# Patient Record
Sex: FEMALE | Race: WHITE | NOT HISPANIC OR LATINO
[De-identification: names, ages, dates, MRNs, and addresses within clinical notes are randomized per-mention and may not be internally consistent; named-entity substitution may affect disease eponyms.]

---

## 2017-02-15 ENCOUNTER — RX RENEWAL (OUTPATIENT)
Age: 57
End: 2017-02-15

## 2017-03-23 ENCOUNTER — LABORATORY RESULT (OUTPATIENT)
Age: 57
End: 2017-03-23

## 2017-03-24 ENCOUNTER — APPOINTMENT (OUTPATIENT)
Dept: FAMILY MEDICINE | Facility: CLINIC | Age: 57
End: 2017-03-24

## 2017-03-24 VITALS
OXYGEN SATURATION: 98 % | TEMPERATURE: 98.2 F | SYSTOLIC BLOOD PRESSURE: 110 MMHG | DIASTOLIC BLOOD PRESSURE: 70 MMHG | WEIGHT: 145 LBS | HEIGHT: 64 IN | HEART RATE: 68 BPM | RESPIRATION RATE: 12 BRPM | BODY MASS INDEX: 24.75 KG/M2

## 2017-03-25 ENCOUNTER — LABORATORY RESULT (OUTPATIENT)
Age: 57
End: 2017-03-25

## 2017-03-27 LAB
ALBUMIN SERPL ELPH-MCNC: 4.5 G/DL
ALP BLD-CCNC: 89 U/L
ALT SERPL-CCNC: 28 U/L
ANA SER IF-ACNC: NEGATIVE
ANION GAP SERPL CALC-SCNC: 17 MMOL/L
APPEARANCE: CLEAR
AST SERPL-CCNC: 21 U/L
BASOPHILS # BLD AUTO: 0.03 K/UL
BASOPHILS NFR BLD AUTO: 0.4 %
BILIRUB SERPL-MCNC: 0.2 MG/DL
BILIRUBIN URINE: NEGATIVE
BLOOD URINE: NEGATIVE
BUN SERPL-MCNC: 18 MG/DL
CALCIUM SERPL-MCNC: 10.3 MG/DL
CHLORIDE SERPL-SCNC: 104 MMOL/L
CHOLEST SERPL-MCNC: 211 MG/DL
CHOLEST/HDLC SERPL: 3.9 RATIO
CO2 SERPL-SCNC: 25 MMOL/L
COLOR: YELLOW
CREAT SERPL-MCNC: 1.01 MG/DL
EOSINOPHIL # BLD AUTO: 0.24 K/UL
EOSINOPHIL NFR BLD AUTO: 3.3 %
ERYTHROCYTE [SEDIMENTATION RATE] IN BLOOD BY WESTERGREN METHOD: 6 MM/HR
GLUCOSE QUALITATIVE U: NORMAL
GLUCOSE SERPL-MCNC: 100 MG/DL
HCT VFR BLD CALC: 43.6 %
HDLC SERPL-MCNC: 54 MG/DL
HGB BLD-MCNC: 14.5 G/DL
IMM GRANULOCYTES NFR BLD AUTO: 0.1 %
KETONES URINE: NEGATIVE
LDLC SERPL CALC-MCNC: 116 MG/DL
LEUKOCYTE ESTERASE URINE: ABNORMAL
LYMPHOCYTES # BLD AUTO: 2.09 K/UL
LYMPHOCYTES NFR BLD AUTO: 28.6 %
MAN DIFF?: NORMAL
MCHC RBC-ENTMCNC: 31.7 PG
MCHC RBC-ENTMCNC: 33.3 GM/DL
MCV RBC AUTO: 95.2 FL
MONOCYTES # BLD AUTO: 0.47 K/UL
MONOCYTES NFR BLD AUTO: 6.4 %
NEUTROPHILS # BLD AUTO: 4.46 K/UL
NEUTROPHILS NFR BLD AUTO: 61.2 %
NITRITE URINE: NEGATIVE
PH URINE: 5.5
PLATELET # BLD AUTO: 284 K/UL
POTASSIUM SERPL-SCNC: 4.1 MMOL/L
PROT SERPL-MCNC: 7 G/DL
PROTEIN URINE: NEGATIVE
RBC # BLD: 4.58 M/UL
RBC # FLD: 13.3 %
RHEUMATOID FACT SER QL: 7.2 IU/ML
SODIUM SERPL-SCNC: 146 MMOL/L
SPECIFIC GRAVITY URINE: 1.01
TRIGL SERPL-MCNC: 207 MG/DL
TSH SERPL-ACNC: 0.82 UIU/ML
URATE SERPL-MCNC: 5.7 MG/DL
UROBILINOGEN URINE: NORMAL
WBC # FLD AUTO: 7.3 K/UL

## 2017-03-28 LAB
ENA SS-A AB SER IA-ACNC: <0.2 AL
ENA SS-B AB SER IA-ACNC: <0.2 AL

## 2017-03-29 LAB

## 2017-03-31 ENCOUNTER — APPOINTMENT (OUTPATIENT)
Dept: FAMILY MEDICINE | Facility: CLINIC | Age: 57
End: 2017-03-31

## 2017-03-31 VITALS
BODY MASS INDEX: 24.75 KG/M2 | WEIGHT: 145 LBS | HEIGHT: 64 IN | DIASTOLIC BLOOD PRESSURE: 72 MMHG | SYSTOLIC BLOOD PRESSURE: 118 MMHG | RESPIRATION RATE: 12 BRPM | OXYGEN SATURATION: 95 % | HEART RATE: 63 BPM

## 2017-04-17 ENCOUNTER — RX RENEWAL (OUTPATIENT)
Age: 57
End: 2017-04-17

## 2017-04-25 ENCOUNTER — APPOINTMENT (OUTPATIENT)
Dept: FAMILY MEDICINE | Facility: CLINIC | Age: 57
End: 2017-04-25

## 2017-04-25 VITALS
WEIGHT: 145 LBS | HEIGHT: 64 IN | BODY MASS INDEX: 24.75 KG/M2 | HEART RATE: 74 BPM | DIASTOLIC BLOOD PRESSURE: 72 MMHG | RESPIRATION RATE: 13 BRPM | SYSTOLIC BLOOD PRESSURE: 120 MMHG | OXYGEN SATURATION: 98 %

## 2017-04-25 LAB
BILIRUB UR QL STRIP: NORMAL
CLARITY UR: CLEAR
COLLECTION METHOD: NORMAL
GLUCOSE UR-MCNC: NORMAL
HCG UR QL: 0.2 EU/DL
HGB UR QL STRIP.AUTO: NORMAL
KETONES UR-MCNC: NORMAL
LEUKOCYTE ESTERASE UR QL STRIP: NORMAL
NITRITE UR QL STRIP: NORMAL
PH UR STRIP: 5.5
PROT UR STRIP-MCNC: NORMAL
SP GR UR STRIP: 1.02

## 2017-05-24 ENCOUNTER — APPOINTMENT (OUTPATIENT)
Dept: FAMILY MEDICINE | Facility: CLINIC | Age: 57
End: 2017-05-24

## 2017-05-24 VITALS
BODY MASS INDEX: 25.61 KG/M2 | HEIGHT: 64 IN | TEMPERATURE: 98.5 F | WEIGHT: 150 LBS | DIASTOLIC BLOOD PRESSURE: 70 MMHG | OXYGEN SATURATION: 98 % | HEART RATE: 52 BPM | SYSTOLIC BLOOD PRESSURE: 114 MMHG
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
## 2017-05-24 LAB
BILIRUB UR QL STRIP: NEGATIVE
CLARITY UR: NORMAL
COLLECTION METHOD: NORMAL
GLUCOSE UR-MCNC: NEGATIVE
HCG UR QL: 0.2 EU/DL
HGB UR QL STRIP.AUTO: NEGATIVE
KETONES UR-MCNC: NEGATIVE
LEUKOCYTE ESTERASE UR QL STRIP: NORMAL
NITRITE UR QL STRIP: NEGATIVE
PH UR STRIP: 5.5
PROT UR STRIP-MCNC: NEGATIVE
SP GR UR STRIP: 1.01

## 2017-05-24 RX ORDER — OSPEMIFENE 60 MG/1
60 TABLET, FILM COATED ORAL
Qty: 30 | Refills: 0 | Status: DISCONTINUED | COMMUNITY
Start: 2017-03-07 | End: 2017-05-24

## 2017-05-24 RX ORDER — PANTOPRAZOLE 40 MG/1
40 TABLET, DELAYED RELEASE ORAL
Refills: 0 | Status: DISCONTINUED | COMMUNITY
End: 2017-05-24

## 2017-05-24 RX ORDER — SUCRALFATE 1 G/1
1 TABLET ORAL
Qty: 60 | Refills: 0 | Status: DISCONTINUED | COMMUNITY
Start: 2017-03-10 | End: 2017-05-24

## 2017-05-24 RX ORDER — SULFAMETHOXAZOLE AND TRIMETHOPRIM 800; 160 MG/1; MG/1
800-160 TABLET ORAL
Qty: 20 | Refills: 0 | Status: DISCONTINUED | COMMUNITY
Start: 2017-03-31 | End: 2017-05-24

## 2017-05-24 RX ORDER — AZELASTINE HYDROCHLORIDE AND FLUTICASONE PROPIONATE 137; 50 UG/1; UG/1
137-50 SPRAY, METERED NASAL
Qty: 69 | Refills: 0 | Status: DISCONTINUED | COMMUNITY
Start: 2017-02-03 | End: 2017-05-24

## 2017-05-24 RX ORDER — SUCRALFATE 1 G/1
1 TABLET ORAL
Refills: 0 | Status: DISCONTINUED | COMMUNITY
End: 2017-05-24

## 2017-05-26 LAB — BACTERIA UR CULT: NORMAL

## 2017-06-14 ENCOUNTER — APPOINTMENT (OUTPATIENT)
Dept: FAMILY MEDICINE | Facility: CLINIC | Age: 57
End: 2017-06-14

## 2017-06-14 VITALS
RESPIRATION RATE: 12 BRPM | HEART RATE: 74 BPM | HEIGHT: 64 IN | DIASTOLIC BLOOD PRESSURE: 74 MMHG | BODY MASS INDEX: 25.61 KG/M2 | WEIGHT: 150 LBS | OXYGEN SATURATION: 98 % | TEMPERATURE: 98.2 F | SYSTOLIC BLOOD PRESSURE: 122 MMHG

## 2017-06-15 ENCOUNTER — RX RENEWAL (OUTPATIENT)
Age: 57
End: 2017-06-15

## 2017-07-03 ENCOUNTER — RX RENEWAL (OUTPATIENT)
Age: 57
End: 2017-07-03

## 2017-07-12 ENCOUNTER — APPOINTMENT (OUTPATIENT)
Dept: FAMILY MEDICINE | Facility: CLINIC | Age: 57
End: 2017-07-12

## 2017-07-12 VITALS
HEIGHT: 64 IN | WEIGHT: 150 LBS | HEART RATE: 66 BPM | OXYGEN SATURATION: 98 % | DIASTOLIC BLOOD PRESSURE: 60 MMHG | SYSTOLIC BLOOD PRESSURE: 110 MMHG | RESPIRATION RATE: 10 BRPM | BODY MASS INDEX: 25.61 KG/M2 | TEMPERATURE: 98.4 F

## 2017-08-19 ENCOUNTER — RX RENEWAL (OUTPATIENT)
Age: 57
End: 2017-08-19

## 2017-08-21 ENCOUNTER — RX RENEWAL (OUTPATIENT)
Age: 57
End: 2017-08-21

## 2017-10-23 ENCOUNTER — RX RENEWAL (OUTPATIENT)
Age: 57
End: 2017-10-23

## 2018-01-06 ENCOUNTER — APPOINTMENT (OUTPATIENT)
Dept: FAMILY MEDICINE | Facility: CLINIC | Age: 58
End: 2018-01-06

## 2018-01-24 ENCOUNTER — APPOINTMENT (OUTPATIENT)
Dept: FAMILY MEDICINE | Facility: CLINIC | Age: 58
End: 2018-01-24
Payer: COMMERCIAL

## 2018-01-24 VITALS
DIASTOLIC BLOOD PRESSURE: 68 MMHG | WEIGHT: 153 LBS | TEMPERATURE: 98.1 F | BODY MASS INDEX: 26.12 KG/M2 | HEIGHT: 64 IN | RESPIRATION RATE: 12 BRPM | SYSTOLIC BLOOD PRESSURE: 110 MMHG | HEART RATE: 78 BPM | OXYGEN SATURATION: 98 %

## 2018-01-24 PROCEDURE — 99213 OFFICE O/P EST LOW 20 MIN: CPT | Mod: 25

## 2018-01-24 PROCEDURE — 36415 COLL VENOUS BLD VENIPUNCTURE: CPT

## 2018-02-01 ENCOUNTER — RX RENEWAL (OUTPATIENT)
Age: 58
End: 2018-02-01

## 2018-02-14 ENCOUNTER — LABORATORY RESULT (OUTPATIENT)
Age: 58
End: 2018-02-14

## 2018-02-14 ENCOUNTER — APPOINTMENT (OUTPATIENT)
Dept: FAMILY MEDICINE | Facility: CLINIC | Age: 58
End: 2018-02-14
Payer: COMMERCIAL

## 2018-02-14 VITALS
RESPIRATION RATE: 13 BRPM | DIASTOLIC BLOOD PRESSURE: 74 MMHG | HEIGHT: 64 IN | WEIGHT: 153 LBS | OXYGEN SATURATION: 98 % | SYSTOLIC BLOOD PRESSURE: 122 MMHG | HEART RATE: 83 BPM | BODY MASS INDEX: 26.12 KG/M2

## 2018-02-14 PROCEDURE — 99214 OFFICE O/P EST MOD 30 MIN: CPT

## 2018-02-14 RX ORDER — AMOXICILLIN AND CLAVULANATE POTASSIUM 875; 125 MG/1; MG/1
875-125 TABLET, COATED ORAL
Qty: 14 | Refills: 0 | Status: COMPLETED | COMMUNITY
Start: 2018-01-24 | End: 2018-02-14

## 2018-02-14 RX ORDER — NITROFURANTOIN (MONOHYDRATE/MACROCRYSTALS) 25; 75 MG/1; MG/1
100 CAPSULE ORAL
Qty: 14 | Refills: 0 | Status: DISCONTINUED | COMMUNITY
Start: 2017-05-24 | End: 2018-02-14

## 2018-02-14 RX ORDER — MUPIROCIN 20 MG/G
2 OINTMENT TOPICAL
Qty: 22 | Refills: 0 | Status: DISCONTINUED | COMMUNITY
Start: 2017-12-22 | End: 2018-02-14

## 2018-02-15 ENCOUNTER — RX RENEWAL (OUTPATIENT)
Age: 58
End: 2018-02-15

## 2018-02-15 LAB
APPEARANCE: CLEAR
BILIRUBIN URINE: NEGATIVE
BLOOD URINE: NEGATIVE
COLOR: YELLOW
GLUCOSE QUALITATIVE U: NEGATIVE MG/DL
KETONES URINE: NEGATIVE
LEUKOCYTE ESTERASE URINE: ABNORMAL
NITRITE URINE: NEGATIVE
PH URINE: 5.5
PROTEIN URINE: NEGATIVE MG/DL
SPECIFIC GRAVITY URINE: 1.01
UROBILINOGEN URINE: NEGATIVE MG/DL

## 2018-02-28 ENCOUNTER — APPOINTMENT (OUTPATIENT)
Dept: FAMILY MEDICINE | Facility: CLINIC | Age: 58
End: 2018-02-28

## 2018-04-19 ENCOUNTER — APPOINTMENT (OUTPATIENT)
Dept: FAMILY MEDICINE | Facility: CLINIC | Age: 58
End: 2018-04-19
Payer: COMMERCIAL

## 2018-04-19 VITALS
BODY MASS INDEX: 26.12 KG/M2 | RESPIRATION RATE: 12 BRPM | HEIGHT: 64 IN | OXYGEN SATURATION: 98 % | HEART RATE: 82 BPM | DIASTOLIC BLOOD PRESSURE: 74 MMHG | SYSTOLIC BLOOD PRESSURE: 126 MMHG | WEIGHT: 153 LBS

## 2018-04-19 DIAGNOSIS — L08.9 LOCAL INFECTION OF THE SKIN AND SUBCUTANEOUS TISSUE, UNSPECIFIED: ICD-10-CM

## 2018-04-19 DIAGNOSIS — R23.8 OTHER SKIN CHANGES: ICD-10-CM

## 2018-04-19 DIAGNOSIS — Q39.4 ESOPHAGEAL WEB: ICD-10-CM

## 2018-04-19 DIAGNOSIS — L03.114 CELLULITIS OF LEFT UPPER LIMB: ICD-10-CM

## 2018-04-19 DIAGNOSIS — M51.24 OTHER INTERVERTEBRAL DISC DISPLACEMENT, THORACIC REGION: ICD-10-CM

## 2018-04-19 DIAGNOSIS — Q38.3 OTHER CONGENITAL MALFORMATIONS OF TONGUE: ICD-10-CM

## 2018-04-19 PROCEDURE — 36415 COLL VENOUS BLD VENIPUNCTURE: CPT

## 2018-04-19 PROCEDURE — 99214 OFFICE O/P EST MOD 30 MIN: CPT | Mod: 25

## 2018-04-19 RX ORDER — NITROFURANTOIN (MONOHYDRATE/MACROCRYSTALS) 25; 75 MG/1; MG/1
100 CAPSULE ORAL
Qty: 10 | Refills: 0 | Status: DISCONTINUED | COMMUNITY
Start: 2018-02-15 | End: 2018-04-19

## 2018-04-19 RX ORDER — PREDNISONE 10 MG/1
10 TABLET ORAL
Qty: 30 | Refills: 1 | Status: DISCONTINUED | COMMUNITY
Start: 2018-02-01 | End: 2018-04-19

## 2018-07-23 ENCOUNTER — RX RENEWAL (OUTPATIENT)
Age: 58
End: 2018-07-23

## 2018-07-24 ENCOUNTER — APPOINTMENT (OUTPATIENT)
Dept: FAMILY MEDICINE | Facility: CLINIC | Age: 58
End: 2018-07-24
Payer: COMMERCIAL

## 2018-07-24 VITALS
OXYGEN SATURATION: 98 % | DIASTOLIC BLOOD PRESSURE: 74 MMHG | RESPIRATION RATE: 13 BRPM | HEART RATE: 73 BPM | TEMPERATURE: 98.7 F | WEIGHT: 143 LBS | HEIGHT: 64 IN | SYSTOLIC BLOOD PRESSURE: 128 MMHG | BODY MASS INDEX: 24.41 KG/M2

## 2018-07-24 PROCEDURE — 99213 OFFICE O/P EST LOW 20 MIN: CPT

## 2018-07-24 RX ORDER — RABEPRAZOLE SODIUM 20 MG/1
20 TABLET, DELAYED RELEASE ORAL
Qty: 30 | Refills: 0 | Status: COMPLETED | COMMUNITY
Start: 2018-06-07

## 2019-01-15 ENCOUNTER — RX RENEWAL (OUTPATIENT)
Age: 59
End: 2019-01-15

## 2019-04-21 ENCOUNTER — RX RENEWAL (OUTPATIENT)
Age: 59
End: 2019-04-21

## 2019-08-12 ENCOUNTER — RX RENEWAL (OUTPATIENT)
Age: 59
End: 2019-08-12

## 2020-02-06 ENCOUNTER — RX RENEWAL (OUTPATIENT)
Age: 60
End: 2020-02-06

## 2020-02-17 ENCOUNTER — RX RENEWAL (OUTPATIENT)
Age: 60
End: 2020-02-17

## 2020-07-28 ENCOUNTER — APPOINTMENT (OUTPATIENT)
Dept: FAMILY MEDICINE | Facility: CLINIC | Age: 60
End: 2020-07-28
Payer: COMMERCIAL

## 2020-07-28 VITALS
HEART RATE: 80 BPM | TEMPERATURE: 98 F | OXYGEN SATURATION: 98 % | SYSTOLIC BLOOD PRESSURE: 128 MMHG | BODY MASS INDEX: 25.61 KG/M2 | RESPIRATION RATE: 13 BRPM | DIASTOLIC BLOOD PRESSURE: 78 MMHG | HEIGHT: 64 IN | WEIGHT: 150 LBS

## 2020-07-28 PROCEDURE — 99214 OFFICE O/P EST MOD 30 MIN: CPT

## 2020-08-18 ENCOUNTER — APPOINTMENT (OUTPATIENT)
Dept: FAMILY MEDICINE | Facility: CLINIC | Age: 60
End: 2020-08-18
Payer: COMMERCIAL

## 2020-08-18 VITALS
HEART RATE: 78 BPM | HEIGHT: 64 IN | OXYGEN SATURATION: 97 % | TEMPERATURE: 98 F | DIASTOLIC BLOOD PRESSURE: 78 MMHG | RESPIRATION RATE: 12 BRPM | WEIGHT: 150 LBS | BODY MASS INDEX: 25.61 KG/M2 | SYSTOLIC BLOOD PRESSURE: 118 MMHG

## 2020-08-18 DIAGNOSIS — Z87.2 PERSONAL HISTORY OF DISEASES OF THE SKIN AND SUBCUTANEOUS TISSUE: ICD-10-CM

## 2020-08-18 DIAGNOSIS — L02.91 PERSONAL HISTORY OF DISEASES OF THE SKIN AND SUBCUTANEOUS TISSUE: ICD-10-CM

## 2020-08-18 PROCEDURE — 99214 OFFICE O/P EST MOD 30 MIN: CPT

## 2020-08-20 ENCOUNTER — APPOINTMENT (OUTPATIENT)
Dept: FAMILY MEDICINE | Facility: CLINIC | Age: 60
End: 2020-08-20

## 2020-09-02 VITALS — BODY MASS INDEX: 24.75 KG/M2 | WEIGHT: 145 LBS | HEIGHT: 64 IN

## 2020-09-02 NOTE — HISTORY OF PRESENT ILLNESS
[TextBox_13] : Referred by SUSANNA Wen.\par \par Ms. URBANO is a 60 year old female with a history of high cholesterol.\par \par She was called to review eligibility for Low-Dose CT lung cancer screening.  Reviewed and confirmed that the patient meets screening eligibility criteria:\par \par 60 years old \par \par Smoking Status:  Current Smoker\par \par Number of pack(s) per day: 14 cigarettes daily\par Number of years smoked: 45\par Number of pack years smokin.5\par \par Ms. URBANO denies any symptoms of lung cancer, including new cough, change in cough, hemoptysis, and unintentional weight loss.\par \par Ms. URBANO denies any personal history of lung cancer.  No lung cancer in a first degree relative.  Denies any history of lung disease.

## 2020-09-03 ENCOUNTER — OUTPATIENT (OUTPATIENT)
Dept: OUTPATIENT SERVICES | Facility: HOSPITAL | Age: 60
LOS: 1 days | End: 2020-09-03
Payer: COMMERCIAL

## 2020-09-03 ENCOUNTER — APPOINTMENT (OUTPATIENT)
Dept: RADIOLOGY | Facility: CLINIC | Age: 60
End: 2020-09-03
Payer: COMMERCIAL

## 2020-09-03 ENCOUNTER — APPOINTMENT (OUTPATIENT)
Dept: CT IMAGING | Facility: CLINIC | Age: 60
End: 2020-09-03
Payer: COMMERCIAL

## 2020-09-03 DIAGNOSIS — M54.12 RADICULOPATHY, CERVICAL REGION: ICD-10-CM

## 2020-09-03 DIAGNOSIS — Z00.00 ENCOUNTER FOR GENERAL ADULT MEDICAL EXAMINATION WITHOUT ABNORMAL FINDINGS: ICD-10-CM

## 2020-09-03 DIAGNOSIS — M54.2 CERVICALGIA: ICD-10-CM

## 2020-09-03 PROCEDURE — G0297: CPT | Mod: 26

## 2020-09-03 PROCEDURE — G0297: CPT

## 2020-09-03 PROCEDURE — 72040 X-RAY EXAM NECK SPINE 2-3 VW: CPT | Mod: 26

## 2020-09-03 PROCEDURE — 72040 X-RAY EXAM NECK SPINE 2-3 VW: CPT

## 2020-09-16 ENCOUNTER — OUTPATIENT (OUTPATIENT)
Dept: OUTPATIENT SERVICES | Facility: HOSPITAL | Age: 60
LOS: 1 days | End: 2020-09-16

## 2020-09-16 ENCOUNTER — APPOINTMENT (OUTPATIENT)
Dept: MRI IMAGING | Facility: CLINIC | Age: 60
End: 2020-09-16

## 2020-09-16 DIAGNOSIS — M54.12 RADICULOPATHY, CERVICAL REGION: ICD-10-CM

## 2020-09-16 DIAGNOSIS — Z00.00 ENCOUNTER FOR GENERAL ADULT MEDICAL EXAMINATION WITHOUT ABNORMAL FINDINGS: ICD-10-CM

## 2020-10-08 ENCOUNTER — OUTPATIENT (OUTPATIENT)
Dept: OUTPATIENT SERVICES | Facility: HOSPITAL | Age: 60
LOS: 1 days | End: 2020-10-08

## 2020-10-08 ENCOUNTER — APPOINTMENT (OUTPATIENT)
Dept: ULTRASOUND IMAGING | Facility: CLINIC | Age: 60
End: 2020-10-08

## 2020-10-08 ENCOUNTER — APPOINTMENT (OUTPATIENT)
Dept: MAMMOGRAPHY | Facility: CLINIC | Age: 60
End: 2020-10-08

## 2020-10-08 DIAGNOSIS — R92.8 OTHER ABNORMAL AND INCONCLUSIVE FINDINGS ON DIAGNOSTIC IMAGING OF BREAST: ICD-10-CM

## 2020-10-19 ENCOUNTER — RX RENEWAL (OUTPATIENT)
Age: 60
End: 2020-10-19

## 2020-11-20 ENCOUNTER — RESULT REVIEW (OUTPATIENT)
Age: 60
End: 2020-11-20

## 2020-12-20 ENCOUNTER — RX RENEWAL (OUTPATIENT)
Age: 60
End: 2020-12-20

## 2021-03-02 ENCOUNTER — APPOINTMENT (OUTPATIENT)
Dept: FAMILY MEDICINE | Facility: CLINIC | Age: 61
End: 2021-03-02
Payer: COMMERCIAL

## 2021-03-02 VITALS
SYSTOLIC BLOOD PRESSURE: 118 MMHG | TEMPERATURE: 97 F | DIASTOLIC BLOOD PRESSURE: 60 MMHG | HEART RATE: 68 BPM | BODY MASS INDEX: 25.1 KG/M2 | WEIGHT: 147 LBS | RESPIRATION RATE: 16 BRPM | HEIGHT: 64 IN | OXYGEN SATURATION: 98 %

## 2021-03-02 PROCEDURE — 99072 ADDL SUPL MATRL&STAF TM PHE: CPT

## 2021-03-02 PROCEDURE — 99214 OFFICE O/P EST MOD 30 MIN: CPT

## 2021-03-02 RX ORDER — CEFPROZIL 500 MG/1
500 TABLET ORAL
Qty: 14 | Refills: 1 | Status: COMPLETED | COMMUNITY
Start: 2020-07-28 | End: 2021-03-02

## 2021-06-11 ENCOUNTER — RESULT REVIEW (OUTPATIENT)
Age: 61
End: 2021-06-11

## 2021-08-18 ENCOUNTER — APPOINTMENT (OUTPATIENT)
Dept: FAMILY MEDICINE | Facility: CLINIC | Age: 61
End: 2021-08-18
Payer: COMMERCIAL

## 2021-08-18 VITALS
RESPIRATION RATE: 16 BRPM | OXYGEN SATURATION: 98 % | HEART RATE: 70 BPM | HEIGHT: 64 IN | SYSTOLIC BLOOD PRESSURE: 122 MMHG | TEMPERATURE: 98 F | WEIGHT: 147 LBS | BODY MASS INDEX: 25.1 KG/M2 | DIASTOLIC BLOOD PRESSURE: 78 MMHG

## 2021-08-18 PROCEDURE — 99214 OFFICE O/P EST MOD 30 MIN: CPT

## 2021-08-18 NOTE — COUNSELING
[Fall prevention counseling provided] : Fall prevention counseling provided [Adequate lighting] : Adequate lighting [No throw rugs] : No throw rugs [Use proper foot wear] : Use proper foot wear [Behavioral health counseling provided] : Behavioral health counseling provided [Sleep ___ hours/day] : Sleep [unfilled] hours/day [Engage in a relaxing activity] : Engage in a relaxing activity [Plan in advance] : Plan in advance [Risk of tobacco use and health benefits of smoking cessation discussed] : Risk of tobacco use and health benefits of smoking cessation discussed [Use of nicotine replacement therapies and other medications discussed] : Use of nicotine replacement therapies and other medications discussed [Cessation strategies including cessation program discussed] : Cessation strategies including cessation program discussed [Encouraged to pick a quit date and identify support needed to quit] : Encouraged to pick a quit date and identify support needed to quit [AUDIT-C Screening administered and reviewed] : AUDIT-C Screening administered and reviewed [Potential consequences of obesity discussed] : Potential consequences of obesity discussed [Benefits of weight loss discussed] : Benefits of weight loss discussed [Encouraged to increase physical activity] : Encouraged to increase physical activity [Weigh Self Weekly] : weigh self weekly [Decrease Portions] : decrease portions [None] : None [Good understanding] : Patient has a good understanding of lifestyle changes and steps needed to achieve self management goal

## 2021-08-18 NOTE — REVIEW OF SYSTEMS
[Patient Intake Form Reviewed] : Patient intake form was reviewed [Fatigue] : fatigue [Muscle Pain] : muscle pain [Joint Pain] : joint pain [Back Pain] : back pain [Negative] : Heme/Lymph [FreeTextEntry2] : Overweight [FreeTextEntry5] : MIYA [FreeTextEntry6] : Smoking [FreeTextEntry7] : GERD [FreeTextEntry9] : [facility]

## 2021-08-18 NOTE — HISTORY OF PRESENT ILLNESS
[FreeTextEntry1] : C/O lack of saliva, productive cough, and mouth lesions  [de-identified] : 60F presents to the office with complaints of lack of saliva, productive cough, and mouth lesions

## 2021-08-18 NOTE — ASSESSMENT
[FreeTextEntry1] : 60F presents to the office with complaints of lack of saliva, productive cough, and mouth lesions. \par \par oral culture for lesions \par Labs ordered \par Chest xray ordered

## 2021-08-18 NOTE — PHYSICAL EXAM
[de-identified] : C Spine - Decreased ROM, Pain Associated with Mvt. + Compression/Distraction/Spurling Tests [de-identified] : Decreased ROM, Pain Associated with Mvt.  + Leg Raise Tests. [de-identified] : BL Upper Extremity Weakness [de-identified] : Decreased UE and LE DTR's

## 2021-08-18 NOTE — HEALTH RISK ASSESSMENT
[] : Yes [Yes] : Yes [Monthly or less (1 pt)] : Monthly or less (1 point) [1 or 2 (0 pts)] : 1 or 2 (0 points) [Never (0 pts)] : Never (0 points) [No] : In the past 12 months have you used drugs other than those required for medical reasons? No [No falls in past year] : Patient reported no falls in the past year [0] : 2) Feeling down, depressed, or hopeless: Not at all (0) [Audit-CScore] : 1 [de-identified] : Average [de-identified] : Average [facility] : [facility] [[id number]] : 0

## 2021-08-23 ENCOUNTER — OUTPATIENT (OUTPATIENT)
Dept: OUTPATIENT SERVICES | Facility: HOSPITAL | Age: 61
LOS: 1 days | End: 2021-08-23
Payer: COMMERCIAL

## 2021-08-23 ENCOUNTER — APPOINTMENT (OUTPATIENT)
Dept: RADIOLOGY | Facility: CLINIC | Age: 61
End: 2021-08-23
Payer: COMMERCIAL

## 2021-08-23 ENCOUNTER — OUTPATIENT (OUTPATIENT)
Dept: OUTPATIENT SERVICES | Facility: HOSPITAL | Age: 61
LOS: 1 days | End: 2021-08-23

## 2021-08-23 DIAGNOSIS — R07.89 OTHER CHEST PAIN: ICD-10-CM

## 2021-08-23 DIAGNOSIS — Z00.8 ENCOUNTER FOR OTHER GENERAL EXAMINATION: ICD-10-CM

## 2021-08-23 DIAGNOSIS — R05 COUGH: ICD-10-CM

## 2021-08-23 PROCEDURE — 71046 X-RAY EXAM CHEST 2 VIEWS: CPT | Mod: 26

## 2021-08-23 PROCEDURE — 71046 X-RAY EXAM CHEST 2 VIEWS: CPT

## 2021-08-30 LAB — HEMOCCULT STL QL IA: NEGATIVE

## 2021-09-02 ENCOUNTER — NON-APPOINTMENT (OUTPATIENT)
Age: 61
End: 2021-09-02

## 2021-09-03 RX ORDER — TOBRAMYCIN AND DEXAMETHASONE 3; 1 MG/ML; MG/ML
0.3-0.1 SUSPENSION/ DROPS OPHTHALMIC 4 TIMES DAILY
Qty: 1 | Refills: 5 | Status: COMPLETED | COMMUNITY
Start: 2020-07-28 | End: 2021-09-03

## 2021-09-13 ENCOUNTER — APPOINTMENT (OUTPATIENT)
Dept: FAMILY MEDICINE | Facility: CLINIC | Age: 61
End: 2021-09-13

## 2021-10-14 ENCOUNTER — APPOINTMENT (OUTPATIENT)
Dept: INTERNAL MEDICINE | Facility: CLINIC | Age: 61
End: 2021-10-14
Payer: COMMERCIAL

## 2021-10-14 VITALS
BODY MASS INDEX: 24.24 KG/M2 | HEIGHT: 64 IN | WEIGHT: 142 LBS | DIASTOLIC BLOOD PRESSURE: 65 MMHG | TEMPERATURE: 98 F | RESPIRATION RATE: 16 BRPM | HEART RATE: 57 BPM | OXYGEN SATURATION: 98 % | SYSTOLIC BLOOD PRESSURE: 108 MMHG

## 2021-10-14 PROCEDURE — 99203 OFFICE O/P NEW LOW 30 MIN: CPT

## 2021-10-14 NOTE — REVIEW OF SYSTEMS
[As Noted in HPI] : as noted in HPI [Skin Lesions] : skin lesion [Negative] : Heme/Lymph [FreeTextEntry4] : Tongue lesion, as mentioned in HPI [de-identified] : Skin lesions as mentioned in HPI.

## 2021-10-14 NOTE — ASSESSMENT
[FreeTextEntry1] : This 61 year-old woman with recent history of multiple follicular infections, folliculitis.  And a history of shallow ulcer of her tongue.\par \par Explained to patient that reason for her skin lesions may be multifactorial.  Explained that stress can lead to poor immune response, leading to development of aphthous ulcers in the mouth, and also skin infections.\par \par Her skin lesions appear to be healing on its own, will defer antibiotics at this time.\par \par In regards to skin care, advised patient to first and foremost stop smoking.  This would help her skin, also help reduce risk of cardiovascular death, reduce risk of lung disease.\par \par Advised patient to use a good moisturizer/petroleum jelly after showering to moisten up her skin, keeping it pliable and less prone to developing microscopic fissures where bacteria can enter and cause folliculitis.\par \par Patient reports that after her construction is done with her newly purchased property, she plans to take a vacation to [place].\par \par \par \par Patient can follow-up in about 1 month.

## 2021-10-14 NOTE — HISTORY OF PRESENT ILLNESS
[FreeTextEntry1] : This 61-year-old woman with multiple medical issues, including back pain, chronic cough, GERD, hiatal hernia, back pain, seasonal rhinitis, active smoker, recurrent UTIs, dry mouth, who comes to the office for evaluation of multiple skin lesions.\par \par Photos were reviewed with patient on her phone.\par \par she was sent over to this office for evaluation of lack of saliva, tongue sticking to her mouth like Velcro, and lesions underneath her chin, on her left medial ankle, on her underarm of the right side,\par \par Recently, she went to a nail salon and had a pedicure done for her left foot and right foot, had her foot in a bath on the left side followed by increased tenderness and development of a left lesion on her left medial ankle.  After this heal, she developed a lesion on her nose, and also a follicular lesion under her right arm.  She then also noted more lesions under her right armpit, and also her left chin.\par \par She denies squeezing at the lesions, but she has been scratching.  She also reports some similar-appearing lesions on her back.\par \par Review of the photos on her phone show that these are all appearing to be red, slightly elevated, and follicular in appearance.  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  She has bought a new property, under a lot of construction.  She also had to coordinate a lot of the construction team to do the task for her recently purchased property.  He self-reports that she is under a great deal of stress.

## 2021-10-14 NOTE — PHYSICAL EXAM
[General Appearance - Alert] : alert [General Appearance - In No Acute Distress] : in no acute distress [Sclera] : the sclera and conjunctiva were normal [PERRL With Normal Accommodation] : pupils were equal in size, round, reactive to light [Extraocular Movements] : extraocular movements were intact [Outer Ear] : the ears and nose were normal in appearance [Examination Of The Oral Cavity] : the lips and gums were normal [Oropharynx] : the oropharynx was normal with no thrush [Neck Appearance] : the appearance of the neck was normal [Neck Cervical Mass (___cm)] : no neck mass was observed [Jugular Venous Distention Increased] : there was no jugular-venous distention [Thyroid Diffuse Enlargement] : the thyroid was not enlarged [Auscultation Breath Sounds / Voice Sounds] : lungs were clear to auscultation bilaterally [Heart Rate And Rhythm] : heart rate was normal and rhythm regular [Heart Sounds] : normal S1 and S2 [Heart Sounds Gallop] : no gallops [Murmurs] : no murmurs [Heart Sounds Pericardial Friction Rub] : no pericardial rub [Full Pulse] : the pedal pulses are present [Edema] : there was no peripheral edema [Bowel Sounds] : normal bowel sounds [Abdomen Soft] : soft [Abdomen Tenderness] : non-tender [Abdomen Mass (___ Cm)] : no abdominal mass palpated [Costovertebral Angle Tenderness] : no CVA tenderness [No Palpable Adenopathy] : no palpable adenopathy [Musculoskeletal - Swelling] : no joint swelling [Nail Clubbing] : no clubbing  or cyanosis of the fingernails [Motor Tone] : muscle strength and tone were normal [Skin Color & Pigmentation] : normal skin color and pigmentation [] : no rash [FreeTextEntry1] : Very dry skin, scabs on the left medial ankle.  No other skin lesions noted. [Cranial Nerves] : cranial nerves 2-12 were intact [Sensation] : the sensory exam was normal to light touch and pinprick [No Focal Deficits] : no focal deficits [Oriented To Time, Place, And Person] : oriented to person, place, and time [Affect] : the affect was normal

## 2021-10-15 ENCOUNTER — APPOINTMENT (OUTPATIENT)
Dept: FAMILY MEDICINE | Facility: CLINIC | Age: 61
End: 2021-10-15
Payer: COMMERCIAL

## 2021-10-15 VITALS
DIASTOLIC BLOOD PRESSURE: 74 MMHG | HEIGHT: 64 IN | SYSTOLIC BLOOD PRESSURE: 122 MMHG | BODY MASS INDEX: 24.24 KG/M2 | TEMPERATURE: 97.9 F | RESPIRATION RATE: 16 BRPM | OXYGEN SATURATION: 98 % | HEART RATE: 69 BPM | WEIGHT: 142 LBS

## 2021-10-15 PROCEDURE — 99213 OFFICE O/P EST LOW 20 MIN: CPT

## 2021-10-15 RX ORDER — CELECOXIB 200 MG/1
200 CAPSULE ORAL
Qty: 60 | Refills: 0 | Status: COMPLETED | COMMUNITY
Start: 2021-07-14 | End: 2021-10-15

## 2021-10-15 RX ORDER — AMOXICILLIN AND CLAVULANATE POTASSIUM 875; 125 MG/1; MG/1
875-125 TABLET, COATED ORAL
Qty: 14 | Refills: 0 | Status: COMPLETED | COMMUNITY
Start: 2021-09-03 | End: 2021-10-15

## 2021-10-15 RX ORDER — AMOXICILLIN 500 MG/1
500 TABLET, FILM COATED ORAL
Qty: 21 | Refills: 0 | Status: COMPLETED | COMMUNITY
Start: 2021-05-20 | End: 2021-10-15

## 2021-10-20 ENCOUNTER — APPOINTMENT (OUTPATIENT)
Dept: ULTRASOUND IMAGING | Facility: CLINIC | Age: 61
End: 2021-10-20
Payer: COMMERCIAL

## 2021-10-20 ENCOUNTER — OUTPATIENT (OUTPATIENT)
Dept: OUTPATIENT SERVICES | Facility: HOSPITAL | Age: 61
LOS: 1 days | End: 2021-10-20
Payer: COMMERCIAL

## 2021-10-20 DIAGNOSIS — Z00.8 ENCOUNTER FOR OTHER GENERAL EXAMINATION: ICD-10-CM

## 2021-10-20 PROCEDURE — 76856 US EXAM PELVIC COMPLETE: CPT | Mod: 26

## 2021-10-20 PROCEDURE — 76700 US EXAM ABDOM COMPLETE: CPT

## 2021-10-20 PROCEDURE — 76700 US EXAM ABDOM COMPLETE: CPT | Mod: 26

## 2021-10-20 PROCEDURE — 76856 US EXAM PELVIC COMPLETE: CPT

## 2021-12-02 ENCOUNTER — APPOINTMENT (OUTPATIENT)
Dept: INTERNAL MEDICINE | Facility: CLINIC | Age: 61
End: 2021-12-02

## 2022-02-05 ENCOUNTER — TRANSCRIPTION ENCOUNTER (OUTPATIENT)
Age: 62
End: 2022-02-05

## 2022-02-10 RX ORDER — PREGABALIN 25 MG/1
25 CAPSULE ORAL
Qty: 60 | Refills: 0 | Status: COMPLETED | COMMUNITY
Start: 2021-09-18 | End: 2022-02-10

## 2022-02-10 RX ORDER — MELOXICAM 15 MG/1
15 TABLET ORAL
Qty: 30 | Refills: 8 | Status: COMPLETED | COMMUNITY
Start: 2017-10-23 | End: 2022-02-10

## 2022-02-16 RX ORDER — PREGABALIN 75 MG/1
75 CAPSULE ORAL TWICE DAILY
Qty: 28 | Refills: 0 | Status: COMPLETED | COMMUNITY
Start: 2021-09-03 | End: 2022-02-16

## 2022-02-23 ENCOUNTER — LABORATORY RESULT (OUTPATIENT)
Age: 62
End: 2022-02-23

## 2022-02-23 ENCOUNTER — APPOINTMENT (OUTPATIENT)
Dept: FAMILY MEDICINE | Facility: CLINIC | Age: 62
End: 2022-02-23
Payer: COMMERCIAL

## 2022-02-23 VITALS
HEIGHT: 64 IN | HEART RATE: 75 BPM | BODY MASS INDEX: 24.59 KG/M2 | SYSTOLIC BLOOD PRESSURE: 118 MMHG | WEIGHT: 144 LBS | TEMPERATURE: 98 F | DIASTOLIC BLOOD PRESSURE: 70 MMHG | RESPIRATION RATE: 16 BRPM | OXYGEN SATURATION: 98 %

## 2022-02-23 PROCEDURE — 99214 OFFICE O/P EST MOD 30 MIN: CPT | Mod: 25

## 2022-02-23 PROCEDURE — 83036 HEMOGLOBIN GLYCOSYLATED A1C: CPT | Mod: QW

## 2022-02-23 NOTE — HISTORY OF PRESENT ILLNESS
[FreeTextEntry1] : C/o lack of saliva, excessive dryness to mucous membranes, lymphadenopathy and mouth lesions for the past few years. \par  [de-identified] : 62 yo F presents to the office with complaints of lack of saliva, excessive dryness to mucous membranes, lymphadenopathy and mouth lesions for the past few years. not sure thought had tick bite\par

## 2022-02-23 NOTE — ASSESSMENT
[FreeTextEntry1] : 60 yo F presents to the office with complaints of lack of saliva, excessive dryness to mucous membranes, lymphadenopathy and mouth lesions for the past few years. \par \par Care plan reviewed\par Labs ordered\par Meds sent\par POCT A1C - 5.7\par advised RTC in 1-2 weeks for additional allergen testing.

## 2022-02-23 NOTE — COUNSELING
[Fall prevention counseling provided] : Fall prevention counseling provided [Adequate lighting] : Adequate lighting [No throw rugs] : No throw rugs [Use proper foot wear] : Use proper foot wear [Behavioral health counseling provided] : Behavioral health counseling provided [Sleep ___ hours/day] : Sleep [unfilled] hours/day [Engage in a relaxing activity] : Engage in a relaxing activity [Plan in advance] : Plan in advance [AUDIT-C Screening administered and reviewed] : AUDIT-C Screening administered and reviewed [Potential consequences of obesity discussed] : Potential consequences of obesity discussed [Benefits of weight loss discussed] : Benefits of weight loss discussed [Encouraged to increase physical activity] : Encouraged to increase physical activity [Weigh Self Weekly] : weigh self weekly [Decrease Portions] : decrease portions [None] : None [Good understanding] : Patient has a good understanding of lifestyle changes and steps needed to achieve self management goal

## 2022-02-23 NOTE — HEALTH RISK ASSESSMENT
[Yes] : Yes [Monthly or less (1 pt)] : Monthly or less (1 point) [1 or 2 (0 pts)] : 1 or 2 (0 points) [Never (0 pts)] : Never (0 points) [No] : In the past 12 months have you used drugs other than those required for medical reasons? No [No falls in past year] : Patient reported no falls in the past year [0] : 2) Feeling down, depressed, or hopeless: Not at all (0) [Audit-CScore] : 1 [de-identified] : Average [de-identified] : Average [facility] : 8 [[id number]] : 0

## 2022-02-23 NOTE — PHYSICAL EXAM
[No Acute Distress] : no acute distress [Well Nourished] : well nourished [Well Developed] : well developed [Well-Appearing] : well-appearing [Normal Sclera/Conjunctiva] : normal sclera/conjunctiva [PERRL] : pupils equal round and reactive to light [EOMI] : extraocular movements intact [Normal Outer Ear/Nose] : the outer ears and nose were normal in appearance [Normal Oropharynx] : the oropharynx was normal [No JVD] : no jugular venous distention [No Lymphadenopathy] : no lymphadenopathy [Supple] : supple [Thyroid Normal, No Nodules] : the thyroid was normal and there were no nodules present [No Respiratory Distress] : no respiratory distress  [No Accessory Muscle Use] : no accessory muscle use [Clear to Auscultation] : lungs were clear to auscultation bilaterally [Normal Rate] : normal rate  [Regular Rhythm] : with a regular rhythm [Normal S1, S2] : normal S1 and S2 [No Murmur] : no murmur heard [No Carotid Bruits] : no carotid bruits [No Abdominal Bruit] : a ~M bruit was not heard ~T in the abdomen [No Varicosities] : no varicosities [Pedal Pulses Present] : the pedal pulses are present [No Edema] : there was no peripheral edema [No Palpable Aorta] : no palpable aorta [No Extremity Clubbing/Cyanosis] : no extremity clubbing/cyanosis [Soft] : abdomen soft [Non Tender] : non-tender [Non-distended] : non-distended [No Masses] : no abdominal mass palpated [No HSM] : no HSM [Normal Bowel Sounds] : normal bowel sounds [Normal Posterior Cervical Nodes] : no posterior cervical lymphadenopathy [Normal Anterior Cervical Nodes] : no anterior cervical lymphadenopathy [No CVA Tenderness] : no CVA  tenderness [No Spinal Tenderness] : no spinal tenderness [No Joint Swelling] : no joint swelling [Grossly Normal Strength/Tone] : grossly normal strength/tone [No Rash] : no rash [Coordination Grossly Intact] : coordination grossly intact [No Focal Deficits] : no focal deficits [Normal Gait] : normal gait [Deep Tendon Reflexes (DTR)] : deep tendon reflexes were 2+ and symmetric [Normal Affect] : the affect was normal [Normal Insight/Judgement] : insight and judgment were intact [de-identified] : BL Upper Extremity Weakness

## 2022-02-23 NOTE — REVIEW OF SYSTEMS
[Patient Intake Form Reviewed] : Patient intake form was reviewed [Fatigue] : fatigue [Joint Pain] : joint pain [Muscle Pain] : muscle pain [Back Pain] : back pain [Negative] : Heme/Lymph [FreeTextEntry2] : Overweight [FreeTextEntry5] : MIYA [FreeTextEntry6] : Smoking [FreeTextEntry7] : GERD [FreeTextEntry9] : [facility]

## 2022-02-24 ENCOUNTER — RESULT CHARGE (OUTPATIENT)
Age: 62
End: 2022-02-24

## 2022-02-24 LAB
ALBUMIN SERPL ELPH-MCNC: 4.4 G/DL
ALP BLD-CCNC: 91 U/L
ALT SERPL-CCNC: 21 U/L
ANION GAP SERPL CALC-SCNC: 11 MMOL/L
AST SERPL-CCNC: 18 U/L
BASOPHILS # BLD AUTO: 0.04 K/UL
BASOPHILS NFR BLD AUTO: 0.6 %
BILIRUB SERPL-MCNC: <0.2 MG/DL
BUN SERPL-MCNC: 19 MG/DL
CALCIUM SERPL-MCNC: 9.5 MG/DL
CD16+CD56+ CELLS # BLD: 135 /UL
CD16+CD56+ CELLS NFR BLD: 6 %
CD19 CELLS NFR BLD: 300 /UL
CD3 CELLS # BLD: 1684 /UL
CD3 CELLS NFR BLD: 79 %
CD3+CD4+ CELLS # BLD: 1134 /UL
CD3+CD4+ CELLS NFR BLD: 53 %
CD3+CD4+ CELLS/CD3+CD8+ CLL SPEC: 1.57 RATIO
CD3+CD8+ CELLS # SPEC: 722 /UL
CD3+CD8+ CELLS NFR BLD: 34 %
CELLS.CD3-CD19+/CELLS IN BLOOD: 14 %
CHLORIDE SERPL-SCNC: 105 MMOL/L
CHOLEST SERPL-MCNC: 150 MG/DL
CO2 SERPL-SCNC: 26 MMOL/L
CREAT SERPL-MCNC: 1.3 MG/DL
CRP SERPL-MCNC: <3 MG/L
EOSINOPHIL # BLD AUTO: 0.18 K/UL
EOSINOPHIL NFR BLD AUTO: 2.9 %
GLUCOSE SERPL-MCNC: 99 MG/DL
HCT VFR BLD CALC: 42 %
HDLC SERPL-MCNC: 56 MG/DL
HGB BLD-MCNC: 13.3 G/DL
HIV1+2 AB SPEC QL IA.RAPID: NONREACTIVE
IMM GRANULOCYTES NFR BLD AUTO: 0 %
LDLC SERPL CALC-MCNC: 69 MG/DL
LYMPHOCYTES # BLD AUTO: 2.36 K/UL
LYMPHOCYTES NFR BLD AUTO: 38.2 %
MAN DIFF?: NORMAL
MCHC RBC-ENTMCNC: 30.6 PG
MCHC RBC-ENTMCNC: 31.7 GM/DL
MCV RBC AUTO: 96.8 FL
MONOCYTES # BLD AUTO: 0.37 K/UL
MONOCYTES NFR BLD AUTO: 6 %
NEUTROPHILS # BLD AUTO: 3.23 K/UL
NEUTROPHILS NFR BLD AUTO: 52.3 %
NONHDLC SERPL-MCNC: 94 MG/DL
PLATELET # BLD AUTO: 316 K/UL
POTASSIUM SERPL-SCNC: 4.5 MMOL/L
PROT SERPL-MCNC: 6.5 G/DL
RBC # BLD: 4.34 M/UL
RBC # FLD: 14 %
SODIUM SERPL-SCNC: 142 MMOL/L
TRIGL SERPL-MCNC: 126 MG/DL
TSH SERPL-ACNC: 0.91 UIU/ML
WBC # FLD AUTO: 6.18 K/UL

## 2022-02-27 LAB — H.PYLORI ANTIBODY IGA: 97.5 UNITS

## 2022-02-28 RX ORDER — AMOXICILLIN 500 MG/1
500 CAPSULE ORAL 3 TIMES DAILY
Qty: 28 | Refills: 3 | Status: COMPLETED | COMMUNITY
Start: 2022-02-16 | End: 2022-02-28

## 2022-03-01 ENCOUNTER — NON-APPOINTMENT (OUTPATIENT)
Age: 62
End: 2022-03-01

## 2022-03-01 LAB
ANAPLASMA PHAGOCYTOPHILIA IGG ANTIBODIES: NEGATIVE
ANAPLASMA PHAGOCYTOPHILIA IGM ANTIBODIES: NEGATIVE
DNA PLOIDY SPEC FC-IMP: NORMAL
EHRLICIA CHAFFEENIS IGG ANTIBODIES: NEGATIVE
EHRLICIA CHAFFEENIS IGM ANTIBODIES: NEGATIVE

## 2022-03-02 LAB
BABESIA ANTIBODIES, IGG: NORMAL
BABESIA ANTIBODIES, IGM: NORMAL

## 2022-03-03 LAB
R RICKETTSI IGG CSF-ACNC: NEGATIVE
R RICKETTSI IGM CSF-ACNC: 0.54 INDEX

## 2022-03-08 DIAGNOSIS — Z00.00 ENCOUNTER FOR GENERAL ADULT MEDICAL EXAMINATION W/OUT ABNORMAL FINDINGS: ICD-10-CM

## 2022-03-09 ENCOUNTER — NON-APPOINTMENT (OUTPATIENT)
Age: 62
End: 2022-03-09

## 2022-03-14 LAB — H PYLORI AG STL QL: NOT DETECTED

## 2022-03-17 RX ORDER — DOXYCYCLINE 100 MG/1
100 CAPSULE ORAL
Qty: 20 | Refills: 0 | Status: COMPLETED | COMMUNITY
Start: 2022-03-01 | End: 2022-03-17

## 2022-03-25 ENCOUNTER — APPOINTMENT (OUTPATIENT)
Dept: FAMILY MEDICINE | Facility: CLINIC | Age: 62
End: 2022-03-25

## 2022-04-28 ENCOUNTER — APPOINTMENT (OUTPATIENT)
Dept: INTERNAL MEDICINE | Facility: CLINIC | Age: 62
End: 2022-04-28
Payer: COMMERCIAL

## 2022-04-28 VITALS
DIASTOLIC BLOOD PRESSURE: 83 MMHG | HEIGHT: 64 IN | SYSTOLIC BLOOD PRESSURE: 137 MMHG | TEMPERATURE: 97.2 F | HEART RATE: 64 BPM | OXYGEN SATURATION: 98 %

## 2022-04-28 PROCEDURE — 99213 OFFICE O/P EST LOW 20 MIN: CPT

## 2022-04-28 NOTE — HISTORY OF PRESENT ILLNESS
[FreeTextEntry1] : 61-year-old woman smoker who had been seen in the past in October 2021.  She is here for follow-up for intermittent skin swelling.\par \par Interval history reviewed.  About 3 months after her first visit, she had a blood clot to her left leg requiring surgery.\par She is following a vascular doctor for this, and is pending an arterial duplex study.\par \par Since the last visit, patient still reports occasional lip swelling and tongue swelling.  Occasional sore stomach from which starts at her esophagus down to her stomach.  The same issues that she had brought up previously to her other doctors, but not worked up fully.\par She also noted that she had teeth) on her tongue.\par \par She show me several photos:\par \par A photo of her lips which she reports were swollen from April 25, 2022.  A photo of her left arm which she describes as being swollen from April 5, 2022.  A photo of her right left leg which she describes as being swollen from December 26, 2021.\par \par She has no other history.  She is not sure if she has any food allergies but that has never been worked up.\par \par She reports that her brother, has a form of type of allergy.  But is otherwise doing well.\par She has never been tested or seen by an allergist in the past.

## 2022-04-28 NOTE — ASSESSMENT
[FreeTextEntry1] : This 61-year-old woman who presents to the infectious disease office for intermittent swelling of her arms and legs.  With no clear etiology defined yet.\par \par Unclear if this is a form of hereditary angioedema.  We will check C4 level, C1–inhibitor protein.\par I explained to the patient that her photos and constellation of symptoms do not appear to be related to cellulitis.\par \par She would benefit from work-up with an allergist/immunologist.\par \par I have provided the patient with a list of immunologist/allergist in our area who may be able to help her further.\par \par \par Call the patient once results are available in the lab system.\par

## 2022-04-28 NOTE — PHYSICAL EXAM
[General Appearance - Alert] : alert [General Appearance - In No Acute Distress] : in no acute distress [Sclera] : the sclera and conjunctiva were normal [PERRL With Normal Accommodation] : pupils were equal in size, round, reactive to light [Extraocular Movements] : extraocular movements were intact [Outer Ear] : the ears and nose were normal in appearance [Examination Of The Oral Cavity] : the lips and gums were normal [Oropharynx] : the oropharynx was normal with no thrush [Neck Appearance] : the appearance of the neck was normal [Neck Cervical Mass (___cm)] : no neck mass was observed [Jugular Venous Distention Increased] : there was no jugular-venous distention [Thyroid Diffuse Enlargement] : the thyroid was not enlarged [Auscultation Breath Sounds / Voice Sounds] : lungs were clear to auscultation bilaterally [Heart Rate And Rhythm] : heart rate was normal and rhythm regular [Heart Sounds] : normal S1 and S2 [Heart Sounds Gallop] : no gallops [Murmurs] : no murmurs [Full Pulse] : the pedal pulses are present [Heart Sounds Pericardial Friction Rub] : no pericardial rub [Edema] : there was no peripheral edema [Bowel Sounds] : normal bowel sounds [Abdomen Soft] : soft [Abdomen Tenderness] : non-tender [Abdomen Mass (___ Cm)] : no abdominal mass palpated [Costovertebral Angle Tenderness] : no CVA tenderness [No Palpable Adenopathy] : no palpable adenopathy [Musculoskeletal - Swelling] : no joint swelling [Nail Clubbing] : no clubbing  or cyanosis of the fingernails [Motor Tone] : muscle strength and tone were normal [Skin Color & Pigmentation] : normal skin color and pigmentation [] : no rash [FreeTextEntry1] : Very dry skin.  No dermatographia present [Cranial Nerves] : cranial nerves 2-12 were intact [Sensation] : the sensory exam was normal to light touch and pinprick [No Focal Deficits] : no focal deficits [Oriented To Time, Place, And Person] : oriented to person, place, and time [Affect] : the affect was normal

## 2022-05-12 ENCOUNTER — APPOINTMENT (OUTPATIENT)
Dept: FAMILY MEDICINE | Facility: CLINIC | Age: 62
End: 2022-05-12

## 2022-05-25 ENCOUNTER — APPOINTMENT (OUTPATIENT)
Dept: PEDIATRIC ALLERGY IMMUNOLOGY | Facility: CLINIC | Age: 62
End: 2022-05-25
Payer: COMMERCIAL

## 2022-05-25 PROCEDURE — 99204 OFFICE O/P NEW MOD 45 MIN: CPT

## 2022-05-25 NOTE — PHYSICAL EXAM
[Conjunctival Erythema] : no conjunctival erythema [Boggy Nasal Turbinates] : no boggy and/or pale nasal turbinates [Posterior Pharyngeal Cobblestoning] : no posterior pharyngeal cobblestoning [No Neck Mass] : no neck mass was observed [Wheezing] : no wheezing was heard [Normal Rate] : heart rate was normal  [Skin Intact] : skin intact  [de-identified] : Pt anxious and depressed [de-identified] : Mouth - good hydration, no visible oral lesions, no lesions on palate or buccal musoca, no lip swelling, no dariel-oral dermatitis [de-identified] : No hives - clearly lt lower extremity swelling with firmness.hardness without angioedema from mid thign down to ankle

## 2022-05-25 NOTE — REVIEW OF SYSTEMS
[Fatigue] : fatigue [Dry Skin] : ~L dry skin [Swelling] : swelling [Nl] : Respiratory [FreeTextEntry4] : Dry mouth [FreeTextEntry5] : Peripheral vasuclular disease

## 2022-05-25 NOTE — HISTORY OF PRESENT ILLNESS
[de-identified] : 61 yr old with long medical history mostly centered around issue of swelling - swelling has been noted in lips, extremities, feet - pt states that these areas of swelling are chronic, increase and decrease but never go away.  All digital images provided by patient are not consistent with angioedema.  Lip "swelling" images was consistent with minimal dariel-oral dermatitis. No images of urticara were noted\par Pt has underlying history of thrombosis and has had at least one vascular procedure on her left lower extremity that has remained swollen with continual swelling and Lt hip and lower abdominal swelling and pain.  She is followed by both hematology and vascular surgery and is ?? a candidate for further surgery. She has a variety of clotting abnormalities and is on Eliquis and ASA\par She complains of dry mouth and tongue lesions but has seen rheumatology and evaluated and nothing was found. \par Some of her rashes shown to me were consistent with ecchymotic skin lesions from anticoagulants. \par She has seen multiple specialists including Heme, GI, Pulmonary, Rheumatology, Dermatology, Dental , ID - ID recently was concerned that she may have angioedema and did complement studies included, C1a, C4, C1 esterase Inhibitor fx - all of which were normal. - no evidence of acquired angioedema.

## 2022-05-25 NOTE — REASON FOR VISIT
[Initial Evaluation] : an initial evaluation of [Allergy Evaluation/ Skin Testing] : allergy evaluation and or skin testing [Angioedema] : angioedema

## 2022-05-25 NOTE — ASSESSMENT
[FreeTextEntry1] : 61 yr old with PVD and extremity swelling with previous abnormal clotting factors and vascular surgery - needs to follow up with hematology and vascular fur further management\par No evidence of angioedema edema either by Hx or laboratory studies.\par No evidence of urticaria\par No need for further allergy evaluation\par Pt very anxious and upset over lack of diagnosis and therapeutic intervention\par \par Pt needs to follow up with primary and vascular and hematology specialists. ?? need for rheumatology follow up\par \par Total MD time spent on this encounter was 45 minutes.  This includes time devoted to preparing to see the patient with review of previous medical record, obtaining medical history, performing physical exam, counseling and patient education with patient and family, ordering medications and lab studies, documentation in the medical record and coordination of care.\par

## 2022-06-28 ENCOUNTER — APPOINTMENT (OUTPATIENT)
Dept: FAMILY MEDICINE | Facility: CLINIC | Age: 62
End: 2022-06-28
Payer: COMMERCIAL

## 2022-06-28 VITALS
TEMPERATURE: 97.9 F | OXYGEN SATURATION: 99 % | HEIGHT: 64 IN | BODY MASS INDEX: 25.44 KG/M2 | WEIGHT: 149 LBS | RESPIRATION RATE: 16 BRPM | SYSTOLIC BLOOD PRESSURE: 120 MMHG | DIASTOLIC BLOOD PRESSURE: 80 MMHG | HEART RATE: 70 BPM

## 2022-06-28 DIAGNOSIS — Z87.39 PERSONAL HISTORY OF OTHER DISEASES OF THE MUSCULOSKELETAL SYSTEM AND CONNECTIVE TISSUE: ICD-10-CM

## 2022-06-28 DIAGNOSIS — Z72.0 TOBACCO USE: ICD-10-CM

## 2022-06-28 DIAGNOSIS — R10.9 UNSPECIFIED ABDOMINAL PAIN: ICD-10-CM

## 2022-06-28 DIAGNOSIS — M54.12 RADICULOPATHY, CERVICAL REGION: ICD-10-CM

## 2022-06-28 DIAGNOSIS — Z86.19 PERSONAL HISTORY OF OTHER INFECTIOUS AND PARASITIC DISEASES: ICD-10-CM

## 2022-06-28 DIAGNOSIS — J06.9 ACUTE UPPER RESPIRATORY INFECTION, UNSPECIFIED: ICD-10-CM

## 2022-06-28 DIAGNOSIS — Z87.2 PERSONAL HISTORY OF DISEASES OF THE SKIN AND SUBCUTANEOUS TISSUE: ICD-10-CM

## 2022-06-28 DIAGNOSIS — Z87.19 PERSONAL HISTORY OF OTHER DISEASES OF THE DIGESTIVE SYSTEM: ICD-10-CM

## 2022-06-28 DIAGNOSIS — H04.129 DRY EYE SYNDROME OF UNSPECIFIED LACRIMAL GLAND: ICD-10-CM

## 2022-06-28 DIAGNOSIS — K12.1 OTHER FORMS OF STOMATITIS: ICD-10-CM

## 2022-06-28 DIAGNOSIS — R10.32 LEFT LOWER QUADRANT PAIN: ICD-10-CM

## 2022-06-28 DIAGNOSIS — Z88.9 ALLERGY STATUS TO UNSPECIFIED DRUGS, MEDICAMENTS AND BIOLOGICAL SUBSTANCES: ICD-10-CM

## 2022-06-28 DIAGNOSIS — Z12.39 ENCOUNTER FOR OTHER SCREENING FOR MALIGNANT NEOPLASM OF BREAST: ICD-10-CM

## 2022-06-28 DIAGNOSIS — R10.2 PELVIC AND PERINEAL PAIN: ICD-10-CM

## 2022-06-28 DIAGNOSIS — Z87.898 PERSONAL HISTORY OF OTHER SPECIFIED CONDITIONS: ICD-10-CM

## 2022-06-28 DIAGNOSIS — S20.20XA CONTUSION OF THORAX, UNSPECIFIED, INITIAL ENCOUNTER: ICD-10-CM

## 2022-06-28 DIAGNOSIS — Z87.09 PERSONAL HISTORY OF OTHER DISEASES OF THE RESPIRATORY SYSTEM: ICD-10-CM

## 2022-06-28 DIAGNOSIS — A04.8 OTHER SPECIFIED BACTERIAL INTESTINAL INFECTIONS: ICD-10-CM

## 2022-06-28 DIAGNOSIS — M79.89 OTHER SPECIFIED SOFT TISSUE DISORDERS: ICD-10-CM

## 2022-06-28 DIAGNOSIS — G89.4 CHRONIC PAIN SYNDROME: ICD-10-CM

## 2022-06-28 DIAGNOSIS — Z86.39 PERSONAL HISTORY OF OTHER ENDOCRINE, NUTRITIONAL AND METABOLIC DISEASE: ICD-10-CM

## 2022-06-28 PROCEDURE — 99215 OFFICE O/P EST HI 40 MIN: CPT

## 2022-06-28 NOTE — HISTORY OF PRESENT ILLNESS
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

## 2022-06-28 NOTE — PHYSICAL EXAM
[No Acute Distress] : no acute distress [Well Nourished] : well nourished [Well Developed] : well developed [Well-Appearing] : well-appearing [Normal Sclera/Conjunctiva] : normal sclera/conjunctiva [PERRL] : pupils equal round and reactive to light [EOMI] : extraocular movements intact [Normal Outer Ear/Nose] : the outer ears and nose were normal in appearance [Normal Oropharynx] : the oropharynx was normal [No JVD] : no jugular venous distention [No Lymphadenopathy] : no lymphadenopathy [Supple] : supple [Thyroid Normal, No Nodules] : the thyroid was normal and there were no nodules present [No Respiratory Distress] : no respiratory distress  [No Accessory Muscle Use] : no accessory muscle use [Clear to Auscultation] : lungs were clear to auscultation bilaterally [Normal Rate] : normal rate  [Regular Rhythm] : with a regular rhythm [Normal S1, S2] : normal S1 and S2 [No Murmur] : no murmur heard [No Carotid Bruits] : no carotid bruits [No Abdominal Bruit] : a ~M bruit was not heard ~T in the abdomen [No Varicosities] : no varicosities [Pedal Pulses Present] : the pedal pulses are present [No Edema] : there was no peripheral edema [No Palpable Aorta] : no palpable aorta [No Extremity Clubbing/Cyanosis] : no extremity clubbing/cyanosis [Soft] : abdomen soft [Non Tender] : non-tender [Non-distended] : non-distended [No Masses] : no abdominal mass palpated [No HSM] : no HSM [Normal Bowel Sounds] : normal bowel sounds [Normal Posterior Cervical Nodes] : no posterior cervical lymphadenopathy [Normal Anterior Cervical Nodes] : no anterior cervical lymphadenopathy [No CVA Tenderness] : no CVA  tenderness [No Spinal Tenderness] : no spinal tenderness [No Joint Swelling] : no joint swelling [Grossly Normal Strength/Tone] : grossly normal strength/tone [No Rash] : no rash [Coordination Grossly Intact] : coordination grossly intact [No Focal Deficits] : no focal deficits [Normal Gait] : normal gait [Deep Tendon Reflexes (DTR)] : deep tendon reflexes were 2+ and symmetric [Normal Affect] : the affect was normal [Normal Insight/Judgement] : insight and judgment were intact [de-identified] : C Spine - Decreased ROM, Pain Associated with Mvt. + Compression/Distraction/Spurling Tests [de-identified] : Decreased ROM, Pain Associated with Mvt.  + Leg Raise Tests. [de-identified] : BL Upper Extremity Weakness [de-identified] : Decreased UE and LE DTR's

## 2022-06-28 NOTE — HEALTH RISK ASSESSMENT
[Yes] : Yes [Monthly or less (1 pt)] : Monthly or less (1 point) [1 or 2 (0 pts)] : 1 or 2 (0 points) [Never (0 pts)] : Never (0 points) [No] : In the past 12 months have you used drugs other than those required for medical reasons? No [No falls in past year] : Patient reported no falls in the past year [0] : 2) Feeling down, depressed, or hopeless: Not at all (0) [PHQ-2 Negative - No further assessment needed] : PHQ-2 Negative - No further assessment needed [Patient/Caregiver not ready to engage] : , patient/caregiver not ready to engage [I will adhere to the patient's wishes.] : I will adhere to the patient's wishes. [Time Spent: ___ minutes] : Time Spent: [unfilled] minutes [Audit-CScore] : 1 [de-identified] : Average [de-identified] : Average [[facility]] : 9 [[id number]] : 0 [AdvancecareDate] : 10/21

## 2022-06-28 NOTE — COUNSELING
[Fall prevention counseling provided] : Fall prevention counseling provided [Adequate lighting] : Adequate lighting [No throw rugs] : No throw rugs [Use proper foot wear] : Use proper foot wear [Behavioral health counseling provided] : Behavioral health counseling provided [Sleep ___ hours/day] : Sleep [unfilled] hours/day [Engage in a relaxing activity] : Engage in a relaxing activity [Plan in advance] : Plan in advance [AUDIT-C Screening administered and reviewed] : AUDIT-C Screening administered and reviewed [Potential consequences of obesity discussed] : Potential consequences of obesity discussed [Benefits of weight loss discussed] : Benefits of weight loss discussed [Encouraged to increase physical activity] : Encouraged to increase physical activity [Weigh Self Weekly] : weigh self weekly [Decrease Portions] : decrease portions [None] : None [Good understanding] : Patient has a good understanding of lifestyle changes and steps needed to achieve self management goal [Yes] : Risk of tobacco use and health benefits of smoking cessation discussed: Yes

## 2022-06-28 NOTE — HISTORY OF PRESENT ILLNESS
[FreeTextEntry1] : Follow up for swollen left leg due to vascular surgery x5 months [de-identified] : Follow up for swollen left leg due to vascular surgery x5 months

## 2022-06-28 NOTE — PHYSICAL EXAM
[No Acute Distress] : no acute distress [Well Nourished] : well nourished [Well Developed] : well developed [Well-Appearing] : well-appearing [Normal Sclera/Conjunctiva] : normal sclera/conjunctiva [PERRL] : pupils equal round and reactive to light [EOMI] : extraocular movements intact [Normal Outer Ear/Nose] : the outer ears and nose were normal in appearance [Normal Oropharynx] : the oropharynx was normal [No JVD] : no jugular venous distention [No Lymphadenopathy] : no lymphadenopathy [Supple] : supple [Thyroid Normal, No Nodules] : the thyroid was normal and there were no nodules present [No Respiratory Distress] : no respiratory distress  [No Accessory Muscle Use] : no accessory muscle use [Clear to Auscultation] : lungs were clear to auscultation bilaterally [Normal Rate] : normal rate  [Regular Rhythm] : with a regular rhythm [Normal S1, S2] : normal S1 and S2 [No Murmur] : no murmur heard [No Carotid Bruits] : no carotid bruits [No Abdominal Bruit] : a ~M bruit was not heard ~T in the abdomen [No Varicosities] : no varicosities [Pedal Pulses Present] : the pedal pulses are present [No Edema] : there was no peripheral edema [No Palpable Aorta] : no palpable aorta [No Extremity Clubbing/Cyanosis] : no extremity clubbing/cyanosis [Soft] : abdomen soft [Non Tender] : non-tender [Non-distended] : non-distended [No Masses] : no abdominal mass palpated [No HSM] : no HSM [Normal Bowel Sounds] : normal bowel sounds [Normal Posterior Cervical Nodes] : no posterior cervical lymphadenopathy [Normal Anterior Cervical Nodes] : no anterior cervical lymphadenopathy [No CVA Tenderness] : no CVA  tenderness [No Spinal Tenderness] : no spinal tenderness [No Joint Swelling] : no joint swelling [Grossly Normal Strength/Tone] : grossly normal strength/tone [No Rash] : no rash [Coordination Grossly Intact] : coordination grossly intact [No Focal Deficits] : no focal deficits [Normal Gait] : normal gait [Deep Tendon Reflexes (DTR)] : deep tendon reflexes were 2+ and symmetric [Normal Affect] : the affect was normal [Normal Insight/Judgement] : insight and judgment were intact [de-identified] : C Spine - Decreased ROM, Pain Associated with Mvt. + Compression/Distraction/Spurling Tests [de-identified] : Decreased ROM, Pain Associated with Mvt.  + Leg Raise Tests. [de-identified] : BL Upper Extremity Weakness [de-identified] : Decreased UE and LE DTR's

## 2022-06-28 NOTE — COUNSELING
[Fall prevention counseling provided] : Fall prevention counseling provided [Adequate lighting] : Adequate lighting [No throw rugs] : No throw rugs [Use proper foot wear] : Use proper foot wear [Behavioral health counseling provided] : Behavioral health counseling provided [Sleep ___ hours/day] : Sleep [unfilled] hours/day [Engage in a relaxing activity] : Engage in a relaxing activity [Plan in advance] : Plan in advance [Yes] : Risk of tobacco use and health benefits of smoking cessation discussed: Yes [AUDIT-C Screening administered and reviewed] : AUDIT-C Screening administered and reviewed [Potential consequences of obesity discussed] : Potential consequences of obesity discussed [Benefits of weight loss discussed] : Benefits of weight loss discussed [Encouraged to increase physical activity] : Encouraged to increase physical activity [Weigh Self Weekly] : weigh self weekly [Decrease Portions] : decrease portions [None] : None [Good understanding] : Patient has a good understanding of lifestyle changes and steps needed to achieve self management goal

## 2022-06-28 NOTE — PLAN
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

## 2022-06-28 NOTE — HEALTH RISK ASSESSMENT
[Yes] : Yes [Monthly or less (1 pt)] : Monthly or less (1 point) [1 or 2 (0 pts)] : 1 or 2 (0 points) [Never (0 pts)] : Never (0 points) [No] : In the past 12 months have you used drugs other than those required for medical reasons? No [No falls in past year] : Patient reported no falls in the past year [0] : 2) Feeling down, depressed, or hopeless: Not at all (0) [PHQ-2 Negative - No further assessment needed] : PHQ-2 Negative - No further assessment needed [Patient/Caregiver not ready to engage] : , patient/caregiver not ready to engage [I will adhere to the patient's wishes.] : I will adhere to the patient's wishes. [Time Spent: ___ minutes] : Time Spent: [unfilled] minutes [Audit-CScore] : 1 [de-identified] : Average [de-identified] : Average [facility] : 9 [[id number]] : 0 [AdvancecareDate] : 10/21

## 2022-08-22 ENCOUNTER — APPOINTMENT (OUTPATIENT)
Dept: FAMILY MEDICINE | Facility: CLINIC | Age: 62
End: 2022-08-22

## 2022-08-22 ENCOUNTER — LABORATORY RESULT (OUTPATIENT)
Age: 62
End: 2022-08-22

## 2022-08-22 VITALS
DIASTOLIC BLOOD PRESSURE: 70 MMHG | HEART RATE: 98 BPM | SYSTOLIC BLOOD PRESSURE: 130 MMHG | RESPIRATION RATE: 16 BRPM | HEIGHT: 64 IN | WEIGHT: 152 LBS | BODY MASS INDEX: 25.95 KG/M2 | TEMPERATURE: 97.4 F | OXYGEN SATURATION: 96 %

## 2022-08-22 DIAGNOSIS — H00.014 HORDEOLUM EXTERNUM LEFT UPPER EYELID: ICD-10-CM

## 2022-08-22 DIAGNOSIS — H00.016 HORDEOLUM EXTERNUM LEFT EYE, UNSPECIFIED EYELID: ICD-10-CM

## 2022-08-22 PROCEDURE — 36415 COLL VENOUS BLD VENIPUNCTURE: CPT

## 2022-08-22 PROCEDURE — 99215 OFFICE O/P EST HI 40 MIN: CPT | Mod: 25

## 2022-08-22 RX ORDER — PENICILLIN V POTASSIUM 250 MG/1
250 TABLET, FILM COATED ORAL
Qty: 20 | Refills: 0 | Status: DISCONTINUED | COMMUNITY
Start: 2022-07-11

## 2022-08-22 RX ORDER — ALPRAZOLAM 1 MG/1
1 TABLET ORAL
Qty: 30 | Refills: 0 | Status: DISCONTINUED | COMMUNITY
Start: 2022-07-25

## 2022-08-22 RX ORDER — SUCRALFATE 1 G/10ML
1 SUSPENSION ORAL
Qty: 3360 | Refills: 0 | Status: DISCONTINUED | COMMUNITY
Start: 2022-03-16

## 2022-08-22 RX ORDER — AMOXICILLIN AND CLAVULANATE POTASSIUM 500; 125 MG/1; MG/1
500-125 TABLET, FILM COATED ORAL
Qty: 20 | Refills: 1 | Status: DISCONTINUED | COMMUNITY
Start: 2022-03-10 | End: 2022-08-22

## 2022-08-22 RX ORDER — CLARITHROMYCIN 500 MG/1
500 TABLET, FILM COATED ORAL
Qty: 20 | Refills: 1 | Status: DISCONTINUED | COMMUNITY
Start: 2022-03-10 | End: 2022-08-22

## 2022-08-22 RX ORDER — DULOXETINE HYDROCHLORIDE 30 MG/1
30 CAPSULE, DELAYED RELEASE PELLETS ORAL
Qty: 30 | Refills: 2 | Status: DISCONTINUED | COMMUNITY
Start: 2022-06-28 | End: 2022-08-22

## 2022-08-22 RX ORDER — NICOTINE POLACRILEX 4 MG/1
4 LOZENGE ORAL
Qty: 243 | Refills: 0 | Status: DISCONTINUED | COMMUNITY
Start: 2022-04-04

## 2022-08-22 RX ORDER — METHOCARBAMOL 500 MG/1
500 TABLET, FILM COATED ORAL
Qty: 30 | Refills: 0 | Status: DISCONTINUED | COMMUNITY
Start: 2022-03-03

## 2022-08-22 RX ORDER — ATORVASTATIN CALCIUM 80 MG/1
80 TABLET, FILM COATED ORAL
Qty: 30 | Refills: 0 | Status: DISCONTINUED | COMMUNITY
Start: 2022-07-05

## 2022-08-22 RX ORDER — GABAPENTIN 300 MG/1
300 CAPSULE ORAL
Qty: 60 | Refills: 0 | Status: DISCONTINUED | COMMUNITY
Start: 2022-08-12

## 2022-08-22 NOTE — PHYSICAL EXAM
[No Acute Distress] : no acute distress [Well Nourished] : well nourished [Well Developed] : well developed [Well-Appearing] : well-appearing [Normal Sclera/Conjunctiva] : normal sclera/conjunctiva [PERRL] : pupils equal round and reactive to light [EOMI] : extraocular movements intact [Normal Outer Ear/Nose] : the outer ears and nose were normal in appearance [Normal Oropharynx] : the oropharynx was normal [No JVD] : no jugular venous distention [No Lymphadenopathy] : no lymphadenopathy [Supple] : supple [Thyroid Normal, No Nodules] : the thyroid was normal and there were no nodules present [No Respiratory Distress] : no respiratory distress  [No Accessory Muscle Use] : no accessory muscle use [Clear to Auscultation] : lungs were clear to auscultation bilaterally [Normal Rate] : normal rate  [Regular Rhythm] : with a regular rhythm [Normal S1, S2] : normal S1 and S2 [No Murmur] : no murmur heard [No Carotid Bruits] : no carotid bruits [No Abdominal Bruit] : a ~M bruit was not heard ~T in the abdomen [No Varicosities] : no varicosities [Pedal Pulses Present] : the pedal pulses are present [No Edema] : there was no peripheral edema [No Palpable Aorta] : no palpable aorta [No Extremity Clubbing/Cyanosis] : no extremity clubbing/cyanosis [Soft] : abdomen soft [Non Tender] : non-tender [Non-distended] : non-distended [No Masses] : no abdominal mass palpated [No HSM] : no HSM [Normal Bowel Sounds] : normal bowel sounds [Normal Posterior Cervical Nodes] : no posterior cervical lymphadenopathy [Normal Anterior Cervical Nodes] : no anterior cervical lymphadenopathy [No CVA Tenderness] : no CVA  tenderness [No Spinal Tenderness] : no spinal tenderness [No Joint Swelling] : no joint swelling [Grossly Normal Strength/Tone] : grossly normal strength/tone [No Rash] : no rash [Coordination Grossly Intact] : coordination grossly intact [No Focal Deficits] : no focal deficits [Normal Gait] : normal gait [Deep Tendon Reflexes (DTR)] : deep tendon reflexes were 2+ and symmetric [Normal Affect] : the affect was normal [Normal Insight/Judgement] : insight and judgment were intact [de-identified] : C Spine - Decreased ROM, Pain Associated with Mvt. + Compression/Distraction/Spurling Tests [de-identified] : Decreased ROM, Pain Associated with Mvt.  + Leg Raise Tests. [de-identified] : BL Upper Extremity Weakness [de-identified] : Decreased UE and LE DTR's
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
## 2022-08-22 NOTE — PLAN
[FreeTextEntry1] : Follow up for Leg and swelling and swelling  hx of clots  was seen at t [facility] Had pelvic clot ruma 2 and clot in descending aorta had CT was in [facility] for abdominal pain, and lower  leg edema  and hand blistering , was also seen by GYN has Right upper Quadrant pain, and Left lower, also has been seen at walk[facility] for UTI\par stopped Cymbalta \par Trial of Gabapentin\par still with stress and pain \par RTC 3 \par Has appointment with [facility] \par Extended visit

## 2022-08-22 NOTE — HEALTH RISK ASSESSMENT
[Yes] : Yes [Monthly or less (1 pt)] : Monthly or less (1 point) [1 or 2 (0 pts)] : 1 or 2 (0 points) [Never (0 pts)] : Never (0 points) [No] : In the past 12 months have you used drugs other than those required for medical reasons? No [No falls in past year] : Patient reported no falls in the past year [0] : 2) Feeling down, depressed, or hopeless: Not at all (0) [PHQ-2 Negative - No further assessment needed] : PHQ-2 Negative - No further assessment needed [Patient/Caregiver not ready to engage] : , patient/caregiver not ready to engage [I will adhere to the patient's wishes.] : I will adhere to the patient's wishes. [Time Spent: ___ minutes] : Time Spent: [unfilled] minutes [Audit-CScore] : 1 [de-identified] : Average [de-identified] : Average [[facility]] : 9 [[id number]] : 0 [AdvancecareDate] : 10/21

## 2022-08-23 LAB
ALBUMIN SERPL ELPH-MCNC: 3.9 G/DL
ALP BLD-CCNC: 190 U/L
ALT SERPL-CCNC: 90 U/L
AMYLASE/CREAT SERPL: 23 U/L
ANION GAP SERPL CALC-SCNC: 14 MMOL/L
AST SERPL-CCNC: 35 U/L
BASOPHILS # BLD AUTO: 0.04 K/UL
BASOPHILS NFR BLD AUTO: 0.3 %
BILIRUB SERPL-MCNC: 0.2 MG/DL
BUN SERPL-MCNC: 9 MG/DL
CALCIUM SERPL-MCNC: 9.5 MG/DL
CHLORIDE SERPL-SCNC: 104 MMOL/L
CO2 SERPL-SCNC: 24 MMOL/L
CREAT SERPL-MCNC: 1.01 MG/DL
EGFR: 63 ML/MIN/1.73M2
EOSINOPHIL # BLD AUTO: 0.82 K/UL
EOSINOPHIL NFR BLD AUTO: 7.1 %
GLUCOSE SERPL-MCNC: 110 MG/DL
HCT VFR BLD CALC: 43.4 %
HGB BLD-MCNC: 13.9 G/DL
IMM GRANULOCYTES NFR BLD AUTO: 0.2 %
LPL SERPL-CCNC: 7 U/L
LYMPHOCYTES # BLD AUTO: 2.02 K/UL
LYMPHOCYTES NFR BLD AUTO: 17.5 %
MAN DIFF?: NORMAL
MCHC RBC-ENTMCNC: 30.7 PG
MCHC RBC-ENTMCNC: 32 GM/DL
MCV RBC AUTO: 95.8 FL
MONOCYTES # BLD AUTO: 0.55 K/UL
MONOCYTES NFR BLD AUTO: 4.8 %
NEUTROPHILS # BLD AUTO: 8.11 K/UL
NEUTROPHILS NFR BLD AUTO: 70.1 %
PLATELET # BLD AUTO: 305 K/UL
POTASSIUM SERPL-SCNC: 4.2 MMOL/L
PROT SERPL-MCNC: 6.3 G/DL
RBC # BLD: 4.53 M/UL
RBC # FLD: 13.7 %
SODIUM SERPL-SCNC: 142 MMOL/L
WBC # FLD AUTO: 11.56 K/UL

## 2022-08-24 LAB
APPEARANCE: CLEAR
BILIRUBIN URINE: NEGATIVE
BLOOD URINE: NEGATIVE
COLOR: NORMAL
GLUCOSE QUALITATIVE U: NEGATIVE
KETONES URINE: NEGATIVE
LEUKOCYTE ESTERASE URINE: ABNORMAL
NITRITE URINE: NEGATIVE
PH URINE: 6
PROTEIN URINE: NEGATIVE
SPECIFIC GRAVITY URINE: 1.01
UROBILINOGEN URINE: NORMAL

## 2022-08-29 RX ORDER — ALPRAZOLAM 0.5 MG/1
0.5 TABLET ORAL
Qty: 120 | Refills: 0 | Status: DISCONTINUED | COMMUNITY
Start: 2022-05-25 | End: 2022-08-29

## 2022-08-29 RX ORDER — CIPROFLOXACIN HYDROCHLORIDE 500 MG/1
500 TABLET, FILM COATED ORAL
Qty: 28 | Refills: 0 | Status: DISCONTINUED | COMMUNITY
Start: 2022-08-24 | End: 2022-08-29

## 2022-08-31 ENCOUNTER — INPATIENT (INPATIENT)
Facility: HOSPITAL | Age: 62
LOS: 2 days | Discharge: HOME | DRG: 872 | End: 2022-09-03
Attending: GENERAL ACUTE CARE HOSPITAL | Admitting: FAMILY MEDICINE
Payer: COMMERCIAL

## 2022-08-31 VITALS
HEART RATE: 105 BPM | RESPIRATION RATE: 26 BRPM | DIASTOLIC BLOOD PRESSURE: 54 MMHG | SYSTOLIC BLOOD PRESSURE: 77 MMHG | HEIGHT: 64 IN | WEIGHT: 145.06 LBS | TEMPERATURE: 100 F | OXYGEN SATURATION: 97 %

## 2022-08-31 DIAGNOSIS — I74.9 EMBOLISM AND THROMBOSIS OF UNSPECIFIED ARTERY: ICD-10-CM

## 2022-08-31 DIAGNOSIS — Z98.890 OTHER SPECIFIED POSTPROCEDURAL STATES: Chronic | ICD-10-CM

## 2022-08-31 DIAGNOSIS — R07.9 CHEST PAIN, UNSPECIFIED: ICD-10-CM

## 2022-08-31 DIAGNOSIS — A41.9 SEPSIS, UNSPECIFIED ORGANISM: ICD-10-CM

## 2022-08-31 DIAGNOSIS — F41.9 ANXIETY DISORDER, UNSPECIFIED: ICD-10-CM

## 2022-08-31 DIAGNOSIS — Z87.81 PERSONAL HISTORY OF (HEALED) TRAUMATIC FRACTURE: Chronic | ICD-10-CM

## 2022-08-31 DIAGNOSIS — R65.10 SYSTEMIC INFLAMMATORY RESPONSE SYNDROME (SIRS) OF NON-INFECTIOUS ORIGIN WITHOUT ACUTE ORGAN DYSFUNCTION: ICD-10-CM

## 2022-08-31 DIAGNOSIS — Z86.718 PERSONAL HISTORY OF OTHER VENOUS THROMBOSIS AND EMBOLISM: ICD-10-CM

## 2022-08-31 DIAGNOSIS — R06.00 DYSPNEA, UNSPECIFIED: ICD-10-CM

## 2022-08-31 DIAGNOSIS — R09.02 HYPOXEMIA: ICD-10-CM

## 2022-08-31 LAB
ALBUMIN SERPL ELPH-MCNC: 3.4 G/DL — SIGNIFICANT CHANGE UP (ref 3.3–5.2)
ALP SERPL-CCNC: 107 U/L — SIGNIFICANT CHANGE UP (ref 40–120)
ALT FLD-CCNC: 33 U/L — HIGH
ANION GAP SERPL CALC-SCNC: 12 MMOL/L — SIGNIFICANT CHANGE UP (ref 5–17)
APPEARANCE UR: CLEAR — SIGNIFICANT CHANGE UP
APTT BLD: 24.7 SEC — LOW (ref 27.5–35.5)
AST SERPL-CCNC: 28 U/L — SIGNIFICANT CHANGE UP
BASE EXCESS BLDA CALC-SCNC: -6 MMOL/L — LOW (ref -2–3)
BASOPHILS # BLD AUTO: 0 K/UL — SIGNIFICANT CHANGE UP (ref 0–0.2)
BASOPHILS NFR BLD AUTO: 0 % — SIGNIFICANT CHANGE UP (ref 0–2)
BILIRUB SERPL-MCNC: 0.4 MG/DL — SIGNIFICANT CHANGE UP (ref 0.4–2)
BILIRUB UR-MCNC: NEGATIVE — SIGNIFICANT CHANGE UP
BLOOD GAS COMMENTS ARTERIAL: SIGNIFICANT CHANGE UP
BUN SERPL-MCNC: 14.2 MG/DL — SIGNIFICANT CHANGE UP (ref 8–20)
CALCIUM SERPL-MCNC: 9.1 MG/DL — SIGNIFICANT CHANGE UP (ref 8.4–10.5)
CHLORIDE SERPL-SCNC: 104 MMOL/L — SIGNIFICANT CHANGE UP (ref 98–107)
CO2 SERPL-SCNC: 21 MMOL/L — LOW (ref 22–29)
COLOR SPEC: YELLOW — SIGNIFICANT CHANGE UP
CREAT SERPL-MCNC: 1.26 MG/DL — SIGNIFICANT CHANGE UP (ref 0.5–1.3)
CRP SERPL-MCNC: 77 MG/L — HIGH
CRP SERPL-MCNC: 82 MG/L — HIGH
DIFF PNL FLD: NEGATIVE — SIGNIFICANT CHANGE UP
EGFR: 48 ML/MIN/1.73M2 — LOW
EOSINOPHIL # BLD AUTO: 0.16 K/UL — SIGNIFICANT CHANGE UP (ref 0–0.5)
EOSINOPHIL NFR BLD AUTO: 0.8 % — SIGNIFICANT CHANGE UP (ref 0–6)
ERYTHROCYTE [SEDIMENTATION RATE] IN BLOOD: 10 MM/HR — SIGNIFICANT CHANGE UP (ref 0–20)
GAS PNL BLDA: SIGNIFICANT CHANGE UP
GLUCOSE SERPL-MCNC: 152 MG/DL — HIGH (ref 70–99)
GLUCOSE UR QL: NEGATIVE MG/DL — SIGNIFICANT CHANGE UP
HCO3 BLDA-SCNC: 19 MMOL/L — LOW (ref 21–28)
HCT VFR BLD CALC: 48.3 % — HIGH (ref 34.5–45)
HGB BLD-MCNC: 15.9 G/DL — HIGH (ref 11.5–15.5)
HIV 1 & 2 AB SERPL IA.RAPID: SIGNIFICANT CHANGE UP
HOROWITZ INDEX BLDA+IHG-RTO: SIGNIFICANT CHANGE UP
INR BLD: 1.14 RATIO — SIGNIFICANT CHANGE UP (ref 0.88–1.16)
KETONES UR-MCNC: NEGATIVE — SIGNIFICANT CHANGE UP
LACTATE BLDV-MCNC: 3.1 MMOL/L — HIGH (ref 0.5–2)
LACTATE BLDV-MCNC: 3.6 MMOL/L — HIGH (ref 0.5–2)
LEUKOCYTE ESTERASE UR-ACNC: NEGATIVE — SIGNIFICANT CHANGE UP
LYMPHOCYTES # BLD AUTO: 0 % — LOW (ref 13–44)
LYMPHOCYTES # BLD AUTO: 0 K/UL — LOW (ref 1–3.3)
MAGNESIUM SERPL-MCNC: 1.8 MG/DL — SIGNIFICANT CHANGE UP (ref 1.6–2.6)
MANUAL SMEAR VERIFICATION: SIGNIFICANT CHANGE UP
MCHC RBC-ENTMCNC: 30.5 PG — SIGNIFICANT CHANGE UP (ref 27–34)
MCHC RBC-ENTMCNC: 32.9 GM/DL — SIGNIFICANT CHANGE UP (ref 32–36)
MCV RBC AUTO: 92.5 FL — SIGNIFICANT CHANGE UP (ref 80–100)
MONOCYTES # BLD AUTO: 0.68 K/UL — SIGNIFICANT CHANGE UP (ref 0–0.9)
MONOCYTES NFR BLD AUTO: 3.5 % — SIGNIFICANT CHANGE UP (ref 2–14)
NEUTROPHILS # BLD AUTO: 18.64 K/UL — HIGH (ref 1.8–7.4)
NEUTROPHILS NFR BLD AUTO: 94.8 % — HIGH (ref 43–77)
NEUTS BAND # BLD: 0.9 % — SIGNIFICANT CHANGE UP (ref 0–8)
NITRITE UR-MCNC: NEGATIVE — SIGNIFICANT CHANGE UP
NT-PROBNP SERPL-SCNC: 439 PG/ML — HIGH (ref 0–300)
NT-PROBNP SERPL-SCNC: 461 PG/ML — HIGH (ref 0–300)
PCO2 BLDA: 33 MMHG — SIGNIFICANT CHANGE UP (ref 32–35)
PH BLDA: 7.37 — SIGNIFICANT CHANGE UP (ref 7.35–7.45)
PH UR: 6 — SIGNIFICANT CHANGE UP (ref 5–8)
PLAT MORPH BLD: NORMAL — SIGNIFICANT CHANGE UP
PLATELET # BLD AUTO: 389 K/UL — SIGNIFICANT CHANGE UP (ref 150–400)
PO2 BLDA: 58 MMHG — LOW (ref 83–108)
POTASSIUM SERPL-MCNC: 4.5 MMOL/L — SIGNIFICANT CHANGE UP (ref 3.5–5.3)
POTASSIUM SERPL-SCNC: 4.5 MMOL/L — SIGNIFICANT CHANGE UP (ref 3.5–5.3)
PROT SERPL-MCNC: 6.4 G/DL — LOW (ref 6.6–8.7)
PROT UR-MCNC: NEGATIVE — SIGNIFICANT CHANGE UP
PROTHROM AB SERPL-ACNC: 13.2 SEC — SIGNIFICANT CHANGE UP (ref 10.5–13.4)
RAPID RVP RESULT: SIGNIFICANT CHANGE UP
RBC # BLD: 5.22 M/UL — HIGH (ref 3.8–5.2)
RBC # FLD: 13.5 % — SIGNIFICANT CHANGE UP (ref 10.3–14.5)
RBC BLD AUTO: NORMAL — SIGNIFICANT CHANGE UP
SAO2 % BLDA: 91 % — LOW (ref 94–98)
SARS-COV-2 RNA SPEC QL NAA+PROBE: SIGNIFICANT CHANGE UP
SODIUM SERPL-SCNC: 137 MMOL/L — SIGNIFICANT CHANGE UP (ref 135–145)
SP GR SPEC: 1.01 — SIGNIFICANT CHANGE UP (ref 1.01–1.02)
TROPONIN T SERPL-MCNC: <0.01 NG/ML — SIGNIFICANT CHANGE UP (ref 0–0.06)
UROBILINOGEN FLD QL: NEGATIVE MG/DL — SIGNIFICANT CHANGE UP
WBC # BLD: 19.48 K/UL — HIGH (ref 3.8–10.5)
WBC # FLD AUTO: 19.48 K/UL — HIGH (ref 3.8–10.5)

## 2022-08-31 PROCEDURE — 99285 EMERGENCY DEPT VISIT HI MDM: CPT

## 2022-08-31 PROCEDURE — 71045 X-RAY EXAM CHEST 1 VIEW: CPT | Mod: 26

## 2022-08-31 PROCEDURE — 99233 SBSQ HOSP IP/OBS HIGH 50: CPT

## 2022-08-31 PROCEDURE — 99223 1ST HOSP IP/OBS HIGH 75: CPT

## 2022-08-31 PROCEDURE — 74177 CT ABD & PELVIS W/CONTRAST: CPT | Mod: 26,MA

## 2022-08-31 PROCEDURE — 71275 CT ANGIOGRAPHY CHEST: CPT | Mod: 26,MA

## 2022-08-31 PROCEDURE — 93010 ELECTROCARDIOGRAM REPORT: CPT | Mod: 77

## 2022-08-31 RX ORDER — SODIUM CHLORIDE 9 MG/ML
2500 INJECTION INTRAMUSCULAR; INTRAVENOUS; SUBCUTANEOUS ONCE
Refills: 0 | Status: COMPLETED | OUTPATIENT
Start: 2022-08-31 | End: 2022-08-31

## 2022-08-31 RX ORDER — IPRATROPIUM/ALBUTEROL SULFATE 18-103MCG
3 AEROSOL WITH ADAPTER (GRAM) INHALATION EVERY 6 HOURS
Refills: 0 | Status: DISCONTINUED | OUTPATIENT
Start: 2022-08-31 | End: 2022-09-03

## 2022-08-31 RX ORDER — IPRATROPIUM/ALBUTEROL SULFATE 18-103MCG
3 AEROSOL WITH ADAPTER (GRAM) INHALATION ONCE
Refills: 0 | Status: COMPLETED | OUTPATIENT
Start: 2022-08-31 | End: 2022-08-31

## 2022-08-31 RX ORDER — ACETAMINOPHEN 500 MG
1000 TABLET ORAL ONCE
Refills: 0 | Status: COMPLETED | OUTPATIENT
Start: 2022-08-31 | End: 2022-08-31

## 2022-08-31 RX ORDER — PANTOPRAZOLE SODIUM 20 MG/1
40 TABLET, DELAYED RELEASE ORAL
Refills: 0 | Status: DISCONTINUED | OUTPATIENT
Start: 2022-08-31 | End: 2022-09-03

## 2022-08-31 RX ORDER — ACETAMINOPHEN 500 MG
650 TABLET ORAL EVERY 6 HOURS
Refills: 0 | Status: DISCONTINUED | OUTPATIENT
Start: 2022-08-31 | End: 2022-09-03

## 2022-08-31 RX ORDER — PANTOPRAZOLE SODIUM 20 MG/1
40 TABLET, DELAYED RELEASE ORAL ONCE
Refills: 0 | Status: COMPLETED | OUTPATIENT
Start: 2022-08-31 | End: 2022-08-31

## 2022-08-31 RX ORDER — ALPRAZOLAM 0.25 MG
0.5 TABLET ORAL
Refills: 0 | Status: DISCONTINUED | OUTPATIENT
Start: 2022-08-31 | End: 2022-08-31

## 2022-08-31 RX ORDER — PIPERACILLIN AND TAZOBACTAM 4; .5 G/20ML; G/20ML
3.38 INJECTION, POWDER, LYOPHILIZED, FOR SOLUTION INTRAVENOUS ONCE
Refills: 0 | Status: DISCONTINUED | OUTPATIENT
Start: 2022-08-31 | End: 2022-08-31

## 2022-08-31 RX ORDER — VANCOMYCIN HCL 1 G
1000 VIAL (EA) INTRAVENOUS ONCE
Refills: 0 | Status: COMPLETED | OUTPATIENT
Start: 2022-08-31 | End: 2022-08-31

## 2022-08-31 RX ORDER — VANCOMYCIN HCL 1 G
1000 VIAL (EA) INTRAVENOUS EVERY 12 HOURS
Refills: 0 | Status: DISCONTINUED | OUTPATIENT
Start: 2022-08-31 | End: 2022-08-31

## 2022-08-31 RX ORDER — VANCOMYCIN HCL 1 G
VIAL (EA) INTRAVENOUS
Refills: 0 | Status: DISCONTINUED | OUTPATIENT
Start: 2022-08-31 | End: 2022-08-31

## 2022-08-31 RX ORDER — ALPRAZOLAM 0.25 MG
1 TABLET ORAL EVERY 12 HOURS
Refills: 0 | Status: DISCONTINUED | OUTPATIENT
Start: 2022-08-31 | End: 2022-09-02

## 2022-08-31 RX ORDER — LACTOBACILLUS ACIDOPHILUS 100MM CELL
1 CAPSULE ORAL
Refills: 0 | Status: DISCONTINUED | OUTPATIENT
Start: 2022-08-31 | End: 2022-09-03

## 2022-08-31 RX ORDER — ONDANSETRON 8 MG/1
4 TABLET, FILM COATED ORAL EVERY 6 HOURS
Refills: 0 | Status: DISCONTINUED | OUTPATIENT
Start: 2022-08-31 | End: 2022-09-03

## 2022-08-31 RX ORDER — ASPIRIN/CALCIUM CARB/MAGNESIUM 324 MG
81 TABLET ORAL DAILY
Refills: 0 | Status: DISCONTINUED | OUTPATIENT
Start: 2022-08-31 | End: 2022-09-03

## 2022-08-31 RX ORDER — GABAPENTIN 400 MG/1
100 CAPSULE ORAL
Refills: 0 | Status: DISCONTINUED | OUTPATIENT
Start: 2022-08-31 | End: 2022-09-03

## 2022-08-31 RX ORDER — PIPERACILLIN AND TAZOBACTAM 4; .5 G/20ML; G/20ML
3.38 INJECTION, POWDER, LYOPHILIZED, FOR SOLUTION INTRAVENOUS ONCE
Refills: 0 | Status: COMPLETED | OUTPATIENT
Start: 2022-08-31 | End: 2022-08-31

## 2022-08-31 RX ORDER — MAGNESIUM SULFATE 500 MG/ML
1 VIAL (ML) INJECTION ONCE
Refills: 0 | Status: COMPLETED | OUTPATIENT
Start: 2022-08-31 | End: 2022-08-31

## 2022-08-31 RX ORDER — PIPERACILLIN AND TAZOBACTAM 4; .5 G/20ML; G/20ML
3.38 INJECTION, POWDER, LYOPHILIZED, FOR SOLUTION INTRAVENOUS EVERY 8 HOURS
Refills: 0 | Status: DISCONTINUED | OUTPATIENT
Start: 2022-08-31 | End: 2022-09-03

## 2022-08-31 RX ORDER — SODIUM CHLORIDE 9 MG/ML
500 INJECTION INTRAMUSCULAR; INTRAVENOUS; SUBCUTANEOUS ONCE
Refills: 0 | Status: COMPLETED | OUTPATIENT
Start: 2022-08-31 | End: 2022-08-31

## 2022-08-31 RX ORDER — APIXABAN 2.5 MG/1
5 TABLET, FILM COATED ORAL EVERY 12 HOURS
Refills: 0 | Status: DISCONTINUED | OUTPATIENT
Start: 2022-08-31 | End: 2022-09-03

## 2022-08-31 RX ORDER — ATORVASTATIN CALCIUM 80 MG/1
20 TABLET, FILM COATED ORAL AT BEDTIME
Refills: 0 | Status: DISCONTINUED | OUTPATIENT
Start: 2022-08-31 | End: 2022-09-03

## 2022-08-31 RX ORDER — SODIUM CHLORIDE 9 MG/ML
1000 INJECTION INTRAMUSCULAR; INTRAVENOUS; SUBCUTANEOUS
Refills: 0 | Status: DISCONTINUED | OUTPATIENT
Start: 2022-08-31 | End: 2022-09-03

## 2022-08-31 RX ADMIN — PIPERACILLIN AND TAZOBACTAM 3.38 GRAM(S): 4; .5 INJECTION, POWDER, LYOPHILIZED, FOR SOLUTION INTRAVENOUS at 12:15

## 2022-08-31 RX ADMIN — Medication 1000 MILLIGRAM(S): at 14:36

## 2022-08-31 RX ADMIN — Medication 650 MILLIGRAM(S): at 22:51

## 2022-08-31 RX ADMIN — PANTOPRAZOLE SODIUM 40 MILLIGRAM(S): 20 TABLET, DELAYED RELEASE ORAL at 18:09

## 2022-08-31 RX ADMIN — Medication 250 MILLIGRAM(S): at 11:34

## 2022-08-31 RX ADMIN — SODIUM CHLORIDE 2500 MILLILITER(S): 9 INJECTION INTRAMUSCULAR; INTRAVENOUS; SUBCUTANEOUS at 10:46

## 2022-08-31 RX ADMIN — Medication 1 MILLIGRAM(S): at 18:09

## 2022-08-31 RX ADMIN — Medication 0.5 MILLIGRAM(S): at 22:51

## 2022-08-31 RX ADMIN — ATORVASTATIN CALCIUM 20 MILLIGRAM(S): 80 TABLET, FILM COATED ORAL at 22:55

## 2022-08-31 RX ADMIN — Medication 3 MILLILITER(S): at 12:39

## 2022-08-31 RX ADMIN — SODIUM CHLORIDE 125 MILLILITER(S): 9 INJECTION INTRAMUSCULAR; INTRAVENOUS; SUBCUTANEOUS at 22:55

## 2022-08-31 RX ADMIN — SODIUM CHLORIDE 500 MILLILITER(S): 9 INJECTION INTRAMUSCULAR; INTRAVENOUS; SUBCUTANEOUS at 23:04

## 2022-08-31 RX ADMIN — Medication 400 MILLIGRAM(S): at 11:06

## 2022-08-31 RX ADMIN — Medication 60 MILLIGRAM(S): at 19:01

## 2022-08-31 RX ADMIN — Medication 3 MILLILITER(S): at 18:09

## 2022-08-31 RX ADMIN — PIPERACILLIN AND TAZOBACTAM 200 GRAM(S): 4; .5 INJECTION, POWDER, LYOPHILIZED, FOR SOLUTION INTRAVENOUS at 10:43

## 2022-08-31 RX ADMIN — Medication 125 MILLIGRAM(S): at 12:38

## 2022-08-31 RX ADMIN — Medication 40 MILLIGRAM(S): at 22:52

## 2022-08-31 RX ADMIN — SODIUM CHLORIDE 2500 MILLILITER(S): 9 INJECTION INTRAMUSCULAR; INTRAVENOUS; SUBCUTANEOUS at 13:00

## 2022-08-31 RX ADMIN — Medication 1000 MILLIGRAM(S): at 12:15

## 2022-08-31 RX ADMIN — Medication 100 GRAM(S): at 19:01

## 2022-08-31 RX ADMIN — PIPERACILLIN AND TAZOBACTAM 25 GRAM(S): 4; .5 INJECTION, POWDER, LYOPHILIZED, FOR SOLUTION INTRAVENOUS at 22:50
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
## 2022-08-31 NOTE — ED ADULT NURSE NOTE - OBJECTIVE STATEMENT
pt alert and oriented x4 comes in c/o SOB, weakness, chills, fevers and burnign on urination. pt on abx for UTI, recently changed to another medication still having symptoms, respirations even unlabored. on 3 LNC for comfort. pt hypotensive in triage, hx blood clots on eliquis

## 2022-08-31 NOTE — ED PROVIDER NOTE - OBJECTIVE STATEMENT
Pertinent PMH/PSH/FHx/SHx and Review of Systems contained within:  Patient presents to the ED for hypoxemia with hypotension and fever.  Otherwise baseline.  PMH of DVT on eliquis. Currently being evaluated for autoimmune process as well.  VSS.  BIBA without intervention.  no O2 at home.  In ED, patient is 90% on RA and improves to 96-98 with 2 L NC.  Non toxic.  Well appearing. No aggravating or relieving factors. No other pertinent PMH.  No other pertinent PSH.  No other pertinent FHx.  Patient denies EtOH/tobacco/illicit substance use. No fever/chills, No photophobia/eye pain/changes in vision, No ear pain/sore throat/dysphagia, No chest pain/palpitations, no SOB/cough/wheeze/stridor, No abdominal pain, No N/V/D, no dysuria/frequency/discharge, No neck/back pain, no rash, no changes in neurological status/function.

## 2022-08-31 NOTE — H&P ADULT - PROBLEM SELECTOR PLAN 3
o2 support, bronchitis ? pneumonia ? will keep on duoneb, iv steroids for 2 days and see response, will request pulmonology consult.  - ct neck soft tissue , h/o tracheostomy, swallow problem ? pt. asking for food , will keep on soft diet and observe. swallow eval.

## 2022-08-31 NOTE — ED ADULT NURSE REASSESSMENT NOTE - NS ED NURSE REASSESS COMMENT FT1
Pt transferred over to me from critical care area. Sepsis w/u done. Currently receiving abx. Pt reports she has UTI. AOx4, GCS 15. Denying CP, SOB, dizziness cade. Afebrile now after tylenol IV administered.
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
## 2022-08-31 NOTE — CONSULT NOTE ADULT - PROBLEM SELECTOR RECOMMENDATION 3
- CTA negative for PE.   - Obtain pBNP.   - History of smoking and with leukocytosis.   - Evaluate for COPD.   - Echo as above.

## 2022-08-31 NOTE — H&P ADULT - PROBLEM SELECTOR PLAN 2
admit to tele  serial trop  echo  continue statin and aspirin  will get echo  atypical cp ? anxiety ? burning in nature , acid reflux ? will keep on ppi.  cardiology consult.   vasculitis ? pt. stated that she is following with a rheumatologist, Dr. Mooney and may continue follow up. admit to tele  serial trop  echo  continue statin and aspirin  will get echo  qtc prolonged on repeat ekg, will repeat in am, Mag 1.8 , 1 gram mag oxide given iv x 1.   atypical cp ? anxiety ? burning in nature , acid reflux ? will keep on ppi.  cardiology consult.   vasculitis ? pt. stated that she is following with a rheumatologist, Dr. Mooney and may continue follow up.

## 2022-08-31 NOTE — ED ADULT NURSE REASSESSMENT NOTE - NS ED NURSE REASSESS COMMENT FT1
Patient lying in bed in no acute distress, tolerating 4L oxygen via nasal cannula. Patient admitted to tele for Hypoxia & Sepsis.  at bedside. Report given to CDU nurse for continuation of care. Jaimee

## 2022-08-31 NOTE — CONSULT NOTE ADULT - NS ATTEND AMEND GEN_ALL_CORE FT
Patient with multiple symptoms.   Elevated white count.   ? of vasculitis.   Her symptoms are non cardiac in nature.   TTE.     NO further cardiac intervention. Will sign off.

## 2022-08-31 NOTE — CONSULT NOTE ADULT - PROBLEM SELECTOR RECOMMENDATION 2
- Early 2022 acute arterial thrombosis of distal aorta with clot extending into the common iliac artery, left femoral artery, and occlusion of the left anterior tibial artery requiring thrombectomy and fasciotomy (subsequent closure) on Eliquis (hypercoagulable workup thus far negative).   - Continue Eliquis at this time.   - Continue telemetry monitoring during admission to evaluate for any atrial fibrillation.

## 2022-08-31 NOTE — CONSULT NOTE ADULT - PROBLEM SELECTOR RECOMMENDATION 9
- Troponins negative x 1, continue to trend.   - EKG with no acute ischemic changes.   - Patient with cardiac workup in the past, Cardiac CTA with pLCx 10-25% 2019 and NST 2021 with no ischemia.   - Symptoms likely non-cardiac chest pain.   - CTA with findings concerning for vasculitis.   - ESR/CRP ordered.   - Due to CT findings and multiple autoimmune concerns, would consult Rheumatology.   - Obtain TTE.

## 2022-08-31 NOTE — H&P ADULT - NSHPPHYSICALEXAM_GEN_ALL_CORE
Vital Signs Last 24 Hrs  T(C): 37.1 (31 Aug 2022 15:35), Max: 38 (31 Aug 2022 10:32)  T(F): 98.8 (31 Aug 2022 15:35), Max: 100.4 (31 Aug 2022 10:32)  HR: 90 (31 Aug 2022 15:35) (90 - 106)  BP: 116/77 (31 Aug 2022 17:15) (77/54 - 116/77)  BP(mean): 80 (31 Aug 2022 15:35) (80 - 80)  RR: 18 (31 Aug 2022 15:35) (18 - 26)  SpO2: 99% (31 Aug 2022 15:35) (97% - 99%)    Parameters below as of 31 Aug 2022 15:35  Patient On (Oxygen Delivery Method): room air    General: anxious female in bed not in distress.   eyes : PERRL. intact EOM.  HENT: AT, NC.  no throat erythema or exudate. no ear discharge.  Neck: supple. no JVD.   Chest: scattered rhonchi bilaterally.  Heart: S1,S2. RRR. no heart murmur. no edema.   Abdomen: soft. non-tender. non-distended. + BS.   Ext: no calf tenderness. ROM of all ext. intact.   vascular : DP 2 + B/L.   Neuro: AAO x3. no focal weakness. no speech disorder, cns ii to xii intact.  Skin: warm and dry.   psych : anxious but no agitation, no si/hi.
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
## 2022-08-31 NOTE — H&P ADULT - NSICDXPASTMEDICALHX_GEN_ALL_CORE_FT
PAST MEDICAL HISTORY:  Anxiety     Deep vein thrombosis (DVT) lower part of her abdominal aorta as per pt.hyperlipide    Hyperlipidemia     Neuropathy      PAST MEDICAL HISTORY:  Anxiety     History of arterial thrombosis as per pt. had a clot in lower part of her abdominal aorta.    Hyperlipidemia     Neuropathy

## 2022-08-31 NOTE — ED ADULT NURSE NOTE - NS ED NOTE ABUSE RESPONSE YN
Senior Admission Note    In summary: Rafia Shaikh is a 84 y.o. male with past medical history of Afib (previously on Xarelto but recently DC'd due to GLF), CAD s/p CABG, LUKE, AS s/p TAVR, HLD, gout, HTN, hypothyroidism, PAD, TIA, MONTRELL (noncompliant with CPAP), GI bleed in 2016 2/2 AVM s/p cauterization presented to the ED following an episode of hematochezia. Pt is confused per family, he is extremely fatigued however and barely able to communicate. He has had multiple GLF recently, one two days prior to admission and one several weeks prior to that. Hx was acquired from wife, however she is a poor historian as well. For around 2 wks pts mentation and energy levels have worsened, he is essentially bed bound now. She denies any other signs or symptoms. In the ED rectal showed BRBPR, Hgb was slightly low and pt was noted to have slight thrombocytopenia and a leukocytosis. CMP showed considerable hypokalemia. UA was not suggestive of infxn. CT head w/o showed no acute pathology. He was 2/4 SIRS (RR and WBC) but hypertensive on arrival. CT a/p showed colitis vs diverticulitis.     Pertinent physical exam findings:    Card: RRR, 3/6 systolic murmur  Pulm: CTAB  Abd: NTND, chacectic   Rectal: visible external hemorrhoids and minimal bright red blood    Assessment and plan in summary:    1.AMS  2.Hematochezia   - Hx of AVM but also diverticulosis   - 2/4 SIRS and signs of inflammation in colon   - Start on C3 and Flagyl due to diverticulitis concerns pt is NT on abd exam but altered and can give no reliabel ROS   - Will get IVF via IV K repletion has hx of CHF so will hold off on sepsis fluids    - CXR to evaluate for other source   - CTM on tele with cardiac monitor    - CT head with contrast, multiple GLF and AMS concern for chronic subdural    - PT and OT consult with fall/aspiration precautions   - Palliative consult in AM    2.Hypokalemia    - Replete as appropriate and CTM   - Will need PO repletion, if fails bedside  swallow needs NG    For full plan, please see Intern note for details   Angel Zhang M.D.  PGY 2                Yes

## 2022-08-31 NOTE — H&P ADULT - NSICDXFAMILYHX_GEN_ALL_CORE_FT
FAMILY HISTORY:  Mother  Still living? Unknown  Family history of cerebrovascular accident (CVA) in mother, Age at diagnosis: Age Unknown

## 2022-08-31 NOTE — ED PROVIDER NOTE - CLINICAL SUMMARY MEDICAL DECISION MAKING FREE TEXT BOX
Patient with hypotension,, hypoxemia, and fever.  exam as above.  labs values c/w sepsis.  CT scan demonstrates no acute pathology but atelectasis present in LLL and lingula.  Urinalysis demonstrates no acute pathology.  Urine culture pending. RVP negative.  abx and IVF given.  BP normalized.  Still requiring 2L NC.  d/w Dr. Iraheta and will admit.  Uneventful ED observation period.

## 2022-08-31 NOTE — H&P ADULT - PROBLEM SELECTOR PLAN 1
source ? pneumonia / bacteremia ?   will keep on zosyn , follow all cultures.   bp improved with iv fluids.   ID consult.

## 2022-09-01 LAB
ALBUMIN SERPL ELPH-MCNC: 2.8 G/DL — LOW (ref 3.3–5.2)
ALP SERPL-CCNC: 76 U/L — SIGNIFICANT CHANGE UP (ref 40–120)
ALT FLD-CCNC: 21 U/L — SIGNIFICANT CHANGE UP
ANION GAP SERPL CALC-SCNC: 10 MMOL/L — SIGNIFICANT CHANGE UP (ref 5–17)
AST SERPL-CCNC: 13 U/L — SIGNIFICANT CHANGE UP
BASOPHILS # BLD AUTO: 0.01 K/UL — SIGNIFICANT CHANGE UP (ref 0–0.2)
BASOPHILS NFR BLD AUTO: 0.1 % — SIGNIFICANT CHANGE UP (ref 0–2)
BILIRUB SERPL-MCNC: 0.2 MG/DL — LOW (ref 0.4–2)
BUN SERPL-MCNC: 12.6 MG/DL — SIGNIFICANT CHANGE UP (ref 8–20)
CALCIUM SERPL-MCNC: 8.2 MG/DL — LOW (ref 8.4–10.5)
CHLORIDE SERPL-SCNC: 107 MMOL/L — SIGNIFICANT CHANGE UP (ref 98–107)
CO2 SERPL-SCNC: 20 MMOL/L — LOW (ref 22–29)
CREAT SERPL-MCNC: 0.96 MG/DL — SIGNIFICANT CHANGE UP (ref 0.5–1.3)
CULTURE RESULTS: SIGNIFICANT CHANGE UP
EGFR: 66 ML/MIN/1.73M2 — SIGNIFICANT CHANGE UP
EOSINOPHIL # BLD AUTO: 0.14 K/UL — SIGNIFICANT CHANGE UP (ref 0–0.5)
EOSINOPHIL NFR BLD AUTO: 1.1 % — SIGNIFICANT CHANGE UP (ref 0–6)
GLUCOSE SERPL-MCNC: 210 MG/DL — HIGH (ref 70–99)
HCT VFR BLD CALC: 37.7 % — SIGNIFICANT CHANGE UP (ref 34.5–45)
HCV AB S/CO SERPL IA: 0.09 S/CO — SIGNIFICANT CHANGE UP (ref 0–0.99)
HCV AB SERPL-IMP: SIGNIFICANT CHANGE UP
HGB BLD-MCNC: 12.6 G/DL — SIGNIFICANT CHANGE UP (ref 11.5–15.5)
IMM GRANULOCYTES NFR BLD AUTO: 0.3 % — SIGNIFICANT CHANGE UP (ref 0–1.5)
LACTATE BLDV-MCNC: 2.7 MMOL/L — HIGH (ref 0.5–2)
LACTATE SERPL-SCNC: 1.9 MMOL/L — SIGNIFICANT CHANGE UP (ref 0.5–2)
LYMPHOCYTES # BLD AUTO: 0.51 K/UL — LOW (ref 1–3.3)
LYMPHOCYTES # BLD AUTO: 4 % — LOW (ref 13–44)
MCHC RBC-ENTMCNC: 30.9 PG — SIGNIFICANT CHANGE UP (ref 27–34)
MCHC RBC-ENTMCNC: 33.4 GM/DL — SIGNIFICANT CHANGE UP (ref 32–36)
MCV RBC AUTO: 92.4 FL — SIGNIFICANT CHANGE UP (ref 80–100)
MONOCYTES # BLD AUTO: 0.1 K/UL — SIGNIFICANT CHANGE UP (ref 0–0.9)
MONOCYTES NFR BLD AUTO: 0.8 % — LOW (ref 2–14)
NEUTROPHILS # BLD AUTO: 12.02 K/UL — HIGH (ref 1.8–7.4)
NEUTROPHILS NFR BLD AUTO: 93.7 % — HIGH (ref 43–77)
PLATELET # BLD AUTO: 341 K/UL — SIGNIFICANT CHANGE UP (ref 150–400)
POTASSIUM SERPL-MCNC: 4.2 MMOL/L — SIGNIFICANT CHANGE UP (ref 3.5–5.3)
POTASSIUM SERPL-SCNC: 4.2 MMOL/L — SIGNIFICANT CHANGE UP (ref 3.5–5.3)
PROT SERPL-MCNC: 5.2 G/DL — LOW (ref 6.6–8.7)
RBC # BLD: 4.08 M/UL — SIGNIFICANT CHANGE UP (ref 3.8–5.2)
RBC # FLD: 13.8 % — SIGNIFICANT CHANGE UP (ref 10.3–14.5)
SODIUM SERPL-SCNC: 137 MMOL/L — SIGNIFICANT CHANGE UP (ref 135–145)
SPECIMEN SOURCE: SIGNIFICANT CHANGE UP
TROPONIN T SERPL-MCNC: <0.01 NG/ML — SIGNIFICANT CHANGE UP (ref 0–0.06)
WBC # BLD: 12.82 K/UL — HIGH (ref 3.8–10.5)
WBC # FLD AUTO: 12.82 K/UL — HIGH (ref 3.8–10.5)

## 2022-09-01 PROCEDURE — 99233 SBSQ HOSP IP/OBS HIGH 50: CPT

## 2022-09-01 PROCEDURE — 99223 1ST HOSP IP/OBS HIGH 75: CPT

## 2022-09-01 PROCEDURE — 70490 CT SOFT TISSUE NECK W/O DYE: CPT | Mod: 26

## 2022-09-01 PROCEDURE — 93010 ELECTROCARDIOGRAM REPORT: CPT

## 2022-09-01 RX ORDER — DIPHENHYDRAMINE HCL 50 MG
25 CAPSULE ORAL ONCE
Refills: 0 | Status: COMPLETED | OUTPATIENT
Start: 2022-09-01 | End: 2022-09-01

## 2022-09-01 RX ADMIN — Medication 40 MILLIGRAM(S): at 21:28

## 2022-09-01 RX ADMIN — Medication 3 MILLILITER(S): at 21:11

## 2022-09-01 RX ADMIN — APIXABAN 5 MILLIGRAM(S): 2.5 TABLET, FILM COATED ORAL at 06:30

## 2022-09-01 RX ADMIN — PIPERACILLIN AND TAZOBACTAM 25 GRAM(S): 4; .5 INJECTION, POWDER, LYOPHILIZED, FOR SOLUTION INTRAVENOUS at 06:30

## 2022-09-01 RX ADMIN — Medication 1 MILLIGRAM(S): at 12:29

## 2022-09-01 RX ADMIN — GABAPENTIN 100 MILLIGRAM(S): 400 CAPSULE ORAL at 06:29

## 2022-09-01 RX ADMIN — PIPERACILLIN AND TAZOBACTAM 25 GRAM(S): 4; .5 INJECTION, POWDER, LYOPHILIZED, FOR SOLUTION INTRAVENOUS at 21:29

## 2022-09-01 RX ADMIN — Medication 81 MILLIGRAM(S): at 12:29

## 2022-09-01 RX ADMIN — Medication 650 MILLIGRAM(S): at 07:50

## 2022-09-01 RX ADMIN — PIPERACILLIN AND TAZOBACTAM 25 GRAM(S): 4; .5 INJECTION, POWDER, LYOPHILIZED, FOR SOLUTION INTRAVENOUS at 13:47

## 2022-09-01 RX ADMIN — Medication 650 MILLIGRAM(S): at 08:20

## 2022-09-01 RX ADMIN — Medication 25 MILLIGRAM(S): at 22:35

## 2022-09-01 RX ADMIN — ATORVASTATIN CALCIUM 20 MILLIGRAM(S): 80 TABLET, FILM COATED ORAL at 21:29

## 2022-09-01 RX ADMIN — Medication 1 MILLIGRAM(S): at 23:40

## 2022-09-01 RX ADMIN — APIXABAN 5 MILLIGRAM(S): 2.5 TABLET, FILM COATED ORAL at 18:18

## 2022-09-01 RX ADMIN — Medication 1 TABLET(S): at 18:37

## 2022-09-01 RX ADMIN — Medication 40 MILLIGRAM(S): at 12:28

## 2022-09-01 RX ADMIN — PANTOPRAZOLE SODIUM 40 MILLIGRAM(S): 20 TABLET, DELAYED RELEASE ORAL at 06:29

## 2022-09-01 RX ADMIN — Medication 1 TABLET(S): at 07:52

## 2022-09-01 RX ADMIN — GABAPENTIN 100 MILLIGRAM(S): 400 CAPSULE ORAL at 18:18

## 2022-09-01 RX ADMIN — Medication 3 MILLILITER(S): at 08:04

## 2022-09-01 NOTE — PROGRESS NOTE ADULT - SUBJECTIVE AND OBJECTIVE BOX
Hospitalist Daily Progress Note    Chief Complaint:  Patient is a 62y old  Female who presents with a chief complaint of sirs/ cp (31 Aug 2022 17:45)      SUBJECTIVE / OVERNIGHT EVENTS:  Patient was seen and examined at bedside. Complains about being weak and short of breath. States that she has a lot of chronic problems.   Patient denies chest pain, SOB, abd pain, N/V, fever, chills, dysuria or any other complaints. All remainder ROS negative.     MEDICATIONS  (STANDING):  albuterol/ipratropium for Nebulization 3 milliLiter(s) Nebulizer every 6 hours  apixaban 5 milliGRAM(s) Oral every 12 hours  aspirin enteric coated 81 milliGRAM(s) Oral daily  atorvastatin 20 milliGRAM(s) Oral at bedtime  gabapentin 100 milliGRAM(s) Oral two times a day  lactobacillus acidophilus 1 Tablet(s) Oral two times a day with meals  methylPREDNISolone sodium succinate Injectable 40 milliGRAM(s) IV Push every 12 hours  pantoprazole    Tablet 40 milliGRAM(s) Oral before breakfast  piperacillin/tazobactam IVPB.. 3.375 Gram(s) IV Intermittent every 8 hours  sodium chloride 0.9%. 1000 milliLiter(s) (125 mL/Hr) IV Continuous <Continuous>    MEDICATIONS  (PRN):  acetaminophen     Tablet .. 650 milliGRAM(s) Oral every 6 hours PRN Temp greater or equal to 38C (100.4F), Mild Pain (1 - 3), Moderate Pain (4 - 6)  ALPRAZolam 1 milliGRAM(s) Oral every 12 hours PRN anxiety  ondansetron Injectable 4 milliGRAM(s) IV Push every 6 hours PRN Nausea and/or Vomiting        I&O's Summary      PHYSICAL EXAM:  Vital Signs Last 24 Hrs  T(C): 36.4 (01 Sep 2022 11:44), Max: 37.1 (31 Aug 2022 15:35)  T(F): 97.6 (01 Sep 2022 11:44), Max: 98.8 (31 Aug 2022 15:35)  HR: 89 (01 Sep 2022 11:44) (79 - 90)  BP: 92/57 (01 Sep 2022 11:44) (91/54 - 116/77)  BP(mean): 80 (31 Aug 2022 15:35) (80 - 80)  RR: 18 (01 Sep 2022 11:44) (17 - 18)  SpO2: 100% (01 Sep 2022 11:44) (94% - 100%)    Parameters below as of 01 Sep 2022 11:44  Patient On (Oxygen Delivery Method): nasal cannula  O2 Flow (L/min): 4        Constitutional: NAD, Resting  ENT: Supple, No JVD  Lungs: CTA B/L, Non-labored breathing  Cardio: RRR, S1/S2, No murmur  Abdomen: Soft, Nontender, Nondistended; Bowel sounds present  Extremities: No calf tenderness, No pitting edema  Musculoskeletal:   No clubbing or cyanosis of digits; no joint swelling or tenderness to palpation  Psych: Calm, cooperative affect appropriate, goes on tangents   Neuro: Awake and alert, oriented to name, location and year  Skin: No rashes; no palpable lesions    LABS:                        12.6   12.82 )-----------( 341      ( 01 Sep 2022 00:06 )             37.7     09-    137  |  107  |  12.6  ----------------------------<  210<H>  4.2   |  20.0<L>  |  0.96    Ca    8.2<L>      01 Sep 2022 00:06  Mg     1.8     08-31    TPro  5.2<L>  /  Alb  2.8<L>  /  TBili  0.2<L>  /  DBili  x   /  AST  13  /  ALT  21  /  AlkPhos  76  09-01    PT/INR - ( 31 Aug 2022 10:43 )   PT: 13.2 sec;   INR: 1.14 ratio         PTT - ( 31 Aug 2022 10:43 )  PTT:24.7 sec  CARDIAC MARKERS ( 01 Sep 2022 00:06 )  x     / <0.01 ng/mL / x     / x     / x      CARDIAC MARKERS ( 31 Aug 2022 17:00 )  x     / <0.01 ng/mL / x     / x     / x          Urinalysis Basic - ( 31 Aug 2022 14:42 )    Color: Yellow / Appearance: Clear / S.010 / pH: x  Gluc: x / Ketone: Negative  / Bili: Negative / Urobili: Negative mg/dL   Blood: x / Protein: Negative / Nitrite: Negative   Leuk Esterase: Negative / RBC: x / WBC x   Sq Epi: x / Non Sq Epi: x / Bacteria: x        CAPILLARY BLOOD GLUCOSE            RADIOLOGY REVIEWED

## 2022-09-01 NOTE — PATIENT PROFILE ADULT - SAFE PLACE TO LIVE
Patient said that she is scheduled to have a hysterectomy on March 16 and wanted to know if that is ok. I asked GRACIELA Pak who said it was ok. I let patient know.    no

## 2022-09-01 NOTE — PATIENT PROFILE ADULT - FALL HARM RISK - RISK INTERVENTIONS

## 2022-09-01 NOTE — PATIENT PROFILE ADULT - FUNCTIONAL ASSESSMENT - BASIC MOBILITY 6.
3-calculated by average/Not able to assess (calculate score using [facility] averaging method)

## 2022-09-02 LAB
ALBUMIN SERPL ELPH-MCNC: 3 G/DL — LOW (ref 3.3–5.2)
ALP SERPL-CCNC: 67 U/L — SIGNIFICANT CHANGE UP (ref 40–120)
ALT FLD-CCNC: 22 U/L — SIGNIFICANT CHANGE UP
ANION GAP SERPL CALC-SCNC: 8 MMOL/L — SIGNIFICANT CHANGE UP (ref 5–17)
AST SERPL-CCNC: 12 U/L — SIGNIFICANT CHANGE UP
BASOPHILS # BLD AUTO: 0.01 K/UL — SIGNIFICANT CHANGE UP (ref 0–0.2)
BASOPHILS NFR BLD AUTO: 0.1 % — SIGNIFICANT CHANGE UP (ref 0–2)
BILIRUB SERPL-MCNC: <0.2 MG/DL — LOW (ref 0.4–2)
BUN SERPL-MCNC: 13.2 MG/DL — SIGNIFICANT CHANGE UP (ref 8–20)
CALCIUM SERPL-MCNC: 8.7 MG/DL — SIGNIFICANT CHANGE UP (ref 8.4–10.5)
CHLORIDE SERPL-SCNC: 111 MMOL/L — HIGH (ref 98–107)
CO2 SERPL-SCNC: 24 MMOL/L — SIGNIFICANT CHANGE UP (ref 22–29)
CREAT SERPL-MCNC: 0.84 MG/DL — SIGNIFICANT CHANGE UP (ref 0.5–1.3)
EGFR: 78 ML/MIN/1.73M2 — SIGNIFICANT CHANGE UP
EOSINOPHIL # BLD AUTO: 0.28 K/UL — SIGNIFICANT CHANGE UP (ref 0–0.5)
EOSINOPHIL NFR BLD AUTO: 2.5 % — SIGNIFICANT CHANGE UP (ref 0–6)
GLUCOSE SERPL-MCNC: 214 MG/DL — HIGH (ref 70–99)
HCT VFR BLD CALC: 32.1 % — LOW (ref 34.5–45)
HGB BLD-MCNC: 10.5 G/DL — LOW (ref 11.5–15.5)
IMM GRANULOCYTES NFR BLD AUTO: 0.5 % — SIGNIFICANT CHANGE UP (ref 0–1.5)
LYMPHOCYTES # BLD AUTO: 0.86 K/UL — LOW (ref 1–3.3)
LYMPHOCYTES # BLD AUTO: 7.8 % — LOW (ref 13–44)
MCHC RBC-ENTMCNC: 30.7 PG — SIGNIFICANT CHANGE UP (ref 27–34)
MCHC RBC-ENTMCNC: 32.7 GM/DL — SIGNIFICANT CHANGE UP (ref 32–36)
MCV RBC AUTO: 93.9 FL — SIGNIFICANT CHANGE UP (ref 80–100)
MONOCYTES # BLD AUTO: 0.23 K/UL — SIGNIFICANT CHANGE UP (ref 0–0.9)
MONOCYTES NFR BLD AUTO: 2.1 % — SIGNIFICANT CHANGE UP (ref 2–14)
NEUTROPHILS # BLD AUTO: 9.6 K/UL — HIGH (ref 1.8–7.4)
NEUTROPHILS NFR BLD AUTO: 87 % — HIGH (ref 43–77)
PLATELET # BLD AUTO: 287 K/UL — SIGNIFICANT CHANGE UP (ref 150–400)
POTASSIUM SERPL-MCNC: 4.5 MMOL/L — SIGNIFICANT CHANGE UP (ref 3.5–5.3)
POTASSIUM SERPL-SCNC: 4.5 MMOL/L — SIGNIFICANT CHANGE UP (ref 3.5–5.3)
PROT SERPL-MCNC: 5.4 G/DL — LOW (ref 6.6–8.7)
RBC # BLD: 3.42 M/UL — LOW (ref 3.8–5.2)
RBC # FLD: 14.4 % — SIGNIFICANT CHANGE UP (ref 10.3–14.5)
SODIUM SERPL-SCNC: 143 MMOL/L — SIGNIFICANT CHANGE UP (ref 135–145)
WBC # BLD: 11.03 K/UL — HIGH (ref 3.8–10.5)
WBC # FLD AUTO: 11.03 K/UL — HIGH (ref 3.8–10.5)

## 2022-09-02 PROCEDURE — 99223 1ST HOSP IP/OBS HIGH 75: CPT

## 2022-09-02 PROCEDURE — 93010 ELECTROCARDIOGRAM REPORT: CPT

## 2022-09-02 PROCEDURE — 99233 SBSQ HOSP IP/OBS HIGH 50: CPT

## 2022-09-02 RX ORDER — ALPRAZOLAM 0.25 MG
1 TABLET ORAL AT BEDTIME
Refills: 0 | Status: DISCONTINUED | OUTPATIENT
Start: 2022-09-02 | End: 2022-09-03

## 2022-09-02 RX ORDER — DIPHENHYDRAMINE HCL 50 MG
25 CAPSULE ORAL ONCE
Refills: 0 | Status: COMPLETED | OUTPATIENT
Start: 2022-09-02 | End: 2022-09-02

## 2022-09-02 RX ADMIN — Medication 3 MILLILITER(S): at 08:48

## 2022-09-02 RX ADMIN — Medication 1 MILLIGRAM(S): at 15:07

## 2022-09-02 RX ADMIN — PIPERACILLIN AND TAZOBACTAM 25 GRAM(S): 4; .5 INJECTION, POWDER, LYOPHILIZED, FOR SOLUTION INTRAVENOUS at 13:44

## 2022-09-02 RX ADMIN — Medication 1 MILLIGRAM(S): at 22:25

## 2022-09-02 RX ADMIN — Medication 3 MILLILITER(S): at 15:51

## 2022-09-02 RX ADMIN — Medication 81 MILLIGRAM(S): at 10:03

## 2022-09-02 RX ADMIN — PANTOPRAZOLE SODIUM 40 MILLIGRAM(S): 20 TABLET, DELAYED RELEASE ORAL at 09:56

## 2022-09-02 RX ADMIN — Medication 1 TABLET(S): at 09:55

## 2022-09-02 RX ADMIN — Medication 650 MILLIGRAM(S): at 15:07

## 2022-09-02 RX ADMIN — PIPERACILLIN AND TAZOBACTAM 25 GRAM(S): 4; .5 INJECTION, POWDER, LYOPHILIZED, FOR SOLUTION INTRAVENOUS at 05:23

## 2022-09-02 RX ADMIN — APIXABAN 5 MILLIGRAM(S): 2.5 TABLET, FILM COATED ORAL at 05:23

## 2022-09-02 RX ADMIN — GABAPENTIN 100 MILLIGRAM(S): 400 CAPSULE ORAL at 05:25

## 2022-09-02 RX ADMIN — Medication 25 MILLIGRAM(S): at 22:31

## 2022-09-02 RX ADMIN — PIPERACILLIN AND TAZOBACTAM 25 GRAM(S): 4; .5 INJECTION, POWDER, LYOPHILIZED, FOR SOLUTION INTRAVENOUS at 21:16

## 2022-09-02 RX ADMIN — APIXABAN 5 MILLIGRAM(S): 2.5 TABLET, FILM COATED ORAL at 17:23

## 2022-09-02 RX ADMIN — Medication 650 MILLIGRAM(S): at 21:13

## 2022-09-02 RX ADMIN — Medication 1 TABLET(S): at 17:22

## 2022-09-02 RX ADMIN — ATORVASTATIN CALCIUM 20 MILLIGRAM(S): 80 TABLET, FILM COATED ORAL at 21:16

## 2022-09-02 RX ADMIN — Medication 650 MILLIGRAM(S): at 22:13

## 2022-09-02 RX ADMIN — GABAPENTIN 100 MILLIGRAM(S): 400 CAPSULE ORAL at 17:23

## 2022-09-02 RX ADMIN — Medication 3 MILLILITER(S): at 20:49

## 2022-09-02 RX ADMIN — Medication 3 MILLILITER(S): at 04:35

## 2022-09-02 RX ADMIN — Medication 25 MILLIGRAM(S): at 13:43

## 2022-09-02 NOTE — CONSULT NOTE ADULT - ASSESSMENT
62y  Female with h/o anxiety, HLD, neuropathy, DVT on eliquis who presents to ED with severe chills, generalized weakness, and productive cough for the past week. In ED she was found to be hypoxic, hypotensive, and febrile to 100.4F with leukocytosis to 19k. Patient was started on Zosyn and admitted to medicine,. She was seen by cardiology for chest pain, Pulmonary for SOB. She was also noted to have facial cellulitis and was started on Zosyn.       Facial cellulitis   Fever  Leukocytosis  Multiple GI complaints       - Blood cultures 8/31 no growth   - Repeat blood cultures if febrile   - RVP/COVID 19 PCR 8/31 negative   - CT Neck reporting cellulitis overlying calcified granulomas related to facial fillers.   - Patient reports she adjusts her fillers herself at home  - Patient was advised to see Plastic surgery for her fillers and management of the fillers. Also educated she should not be manipulating the fillers herself.   - UA 8/31 negative for UTI   - Facial cellulitis seems to be improving on Zosyn  - Continue Zosyn  - On D/C can switch to Keflex  - Last day of antibiotics is 9/9/22  - Recommend GI consult for patient's GI complaints   - Patient should follow up with plastic surgery   - Follow up cultures  - Trend Fever  - Trend WBC      Thank you for allowing me to participate in the care of your patient.   Will sign off. Please call PRN.     d/w Dr Cleaning    
SIRS  Leukocytosis  Lactic acidemia  Possible facial/malar cellulitis  L groin pain radiating to chest and neck    Recommendations:  CT did not reveal acute pathology in abdomen or chest. UA was negative. CT head/neck with possible malar cellulitis. F/u culture data, empiric ABx until cultures resulted. HOB elevation. Pt saturating well on RA, no significant pulmonary symptoms at time of evaluation. Wean steroids to Prednisone 40mg starting tomorrow 9/2/22. Nebs, HOB elevation, OOBTC, ambulate.       Gus Ji M.D.  , Pulmonary & Critical Care Medicine  [facility]  Pulmonary and [facility] Medicine at [place]  [address]  [place], [place] [address]  T: [phone]  F: [phone]
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 Patient states that since these arterial occlusions she has been having multiple issues of unexplained shortness of breath, headaches, body aches, swelling, and skin changes. Patient states that today she felt acute febrile with shortness of breath, headache, and dry mouth so she came to the ER to be evaluated. Patient was found to be hypoxic upon arrival in the ER that improved with oxygen. Patient was a former heavy smoker for >45 years up until 02/22, but has never had a Pulmonology workup for COPD. Patient then had an episode of burning that started in her pelvis and radiated up to her neck. Patient states that she has this frequently, never exertional, at rest, that is associated with her reflux in the past. Patient states that this burning is happening more frequently and she is supposed to f/u with her GI doctor. Patient recently saw her Cardiologist who ordered a 2 week holter monitor to evaluate for Afib, but it was negative. Patient denies any active syncope, vomitting, diarrhea, or vision changes.

## 2022-09-02 NOTE — CONSULT NOTE ADULT - SUBJECTIVE AND OBJECTIVE BOX
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 finding within chest, abdomen or pelvis.    Minimal circumferential wall thickening of the aortic arch/descending   aorta, nonspecific finding. Recommend workup for vasculitis if clinically   warranted.    --- End of Report ---           SANJAY VARGAS MD; Resident Radiologist  This document has been electronically signed.  GRETA BUTT MD; Attending Radiologist  This document has been electronically signed. Aug 31 2022  2:00PM    < end of copied text >      < from: CT Neck Soft Tissue No Cont (22 @ 10:47) >  IMPRESSION: Calcified granulomas noted within the malar fat, RIGHT   greater than LEFT with overlying dermal thickening, possible cellulitis,   on the RIGHT, possibly related to calcified facial fillers. Clinical   correlation needed.    ACDF at C6-7. Disc degeneration noted at C4-5 and C5-C6 and C7-T1 with   loss of disc height and associated degenerative endplate changes.   Narrowing of the LEFT C2-3 and RIGHT C3-4 and LEFT C4-5, LEFT C5-C6 and   BILATERAL C6-7 neural foramina due to uncovertebral spurring and facet   osteophytic hypertrophy.      --- End of Report ---            NANCY ALVARADO MD; Attending Radiologist  This document has been electronically signed. Sep  1 2022  3:39PM    < end of copied text >        ECHO:  < from: TTE Echo Complete w/ Contrast w/ Doppler (22 @ 21:06) >  Summary:   1. Left ventricular ejection fraction, by visual estimation, is 65 to   70%.   2. Normal global left ventricular systolic function.   3. There is no significant pericardial fat pad present.   4. Trivial pericardial effusion.   5. Thickening of the anterior mitral valve leaflet.   6. Trace mitral valve regurgitation.    MD Bridger Electronically signed on 2022 at 10:19:12 AM            *** Final ***    < end of copied text >

## 2022-09-02 NOTE — SWALLOW BEDSIDE ASSESSMENT ADULT - SWALLOW EVAL: DIAGNOSIS
Oropharyngeal swallow appears clinically unremarkable w/no overt s/s penetration or aspiration appreciated w/puree, easy to chew, regular, sips of thin liquids. +Cough demonstrated with mixed consistency (both solid & liquid texture), however no overt s/s penetration or aspiration noted in isolation.

## 2022-09-02 NOTE — SWALLOW BEDSIDE ASSESSMENT ADULT - SLP GENERAL OBSERVATIONS
Recd awake/upright in stretcher in ED, A&A Ox3, 0/10 pain pre/post, tolerating 2L NC no overt distress
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
## 2022-09-02 NOTE — PROGRESS NOTE ADULT - ASSESSMENT
61 yo F with a PMH of anxiety, HLD, neuropathy, DVT on eliquis who presents to ED with severe chills, generalized weakness, and productive cough for the past week. In ED pt. found to be hypoxic, hypotensive, and febrile. Pt. admitted to medicine for further workup and management of SIRS.      #SIRS (Systemic Inflammatory Response Syndrome) with Reactive Leukocytosis    -blood and urine culture pending   -rapid HIV, SARS-CoV2, and RVP negative    -c/w zosyn IV Q8h stop after 7 days   -c/w IV fluids and monitor BP    -elevated lactate; monitor daily    -WBC trending down, continue to monitor    -ID was consulted by nocturnist but no note - Will wait for blood cx and if anything positive will then reconsult ID     #Chest Pain     -serial troponins remain negative   -c/w aspirin and statin    -EKG showed prolonged QT   -monitor on telemetry and  -  -Cardio recs appreciated    #Hypoxemia    -c/w NC O2; titrate down to baseline of RA   -c/w methylprednisolone for 2 days   -CXR, CTA unremarkable; no sign of PE    -pulmonology consult      #Hx of Arterial Thrombosis    -c/x eliquis      #Anxiety   -c/x xanax 0.5mg BID PRN      VTE ppx: Eliquis 5mg Oral Q12h    Diet: DASH; soft and bite sized    Activity: OOB to chair   GOC: full code     Dispo: pending course
61 yo F with a PMH of anxiety, HLD, neuropathy, DVT on eliquis who presents to ED with severe chills, generalized weakness, and productive cough for the past week. In ED pt. found to be hypoxic, hypotensive, and febrile. Pt. admitted to medicine for further workup and management of SIRS.      #SIRS (Systemic Inflammatory Response Syndrome) with Reactive Leukocytosis    -blood and urine culture Negative so far  -rapid HIV, SARS-CoV2, and RVP negative    -c/w zosyn IV Q8h stop after 7 days   -c/w IV fluids and monitor BP    -Lactate resolved  -WBC trending down, continue to monitor    -ID consulted  -CT neck shows ?facial cellulitis vs calcified fillers - Called radiology in regards to read and pending further clarification    #Chest Pain     -serial troponin remain negative   -c/w aspirin and statin    -EKG showed prolonged QT   -monitor on telemetry and  -  -Cardio recs appreciated    #Hypoxemia    -c/w NC O2; titrate down to baseline of RA   -c/w prednisone   -CXR, CTA unremarkable; no sign of PE    -pulmonology recs appreciated    #Hx of Arterial Thrombosis    -c/x eliquis      #Anxiety   -c/x xanax 0.5mg BID PRN      VTE ppx: Eliquis 5mg Oral Q12h    Diet: DASH; soft and bite sized    Activity: OOB to chair   GOC: full code     Dispo: pending course

## 2022-09-03 ENCOUNTER — TRANSCRIPTION ENCOUNTER (OUTPATIENT)
Age: 62
End: 2022-09-03

## 2022-09-03 VITALS
TEMPERATURE: 98 F | HEART RATE: 80 BPM | OXYGEN SATURATION: 96 % | DIASTOLIC BLOOD PRESSURE: 61 MMHG | RESPIRATION RATE: 18 BRPM | SYSTOLIC BLOOD PRESSURE: 107 MMHG

## 2022-09-03 LAB
ALBUMIN SERPL ELPH-MCNC: 3 G/DL — LOW (ref 3.3–5.2)
ALP SERPL-CCNC: 63 U/L — SIGNIFICANT CHANGE UP (ref 40–120)
ALT FLD-CCNC: 27 U/L — SIGNIFICANT CHANGE UP
ANION GAP SERPL CALC-SCNC: 7 MMOL/L — SIGNIFICANT CHANGE UP (ref 5–17)
ANISOCYTOSIS BLD QL: SLIGHT — SIGNIFICANT CHANGE UP
AST SERPL-CCNC: 14 U/L — SIGNIFICANT CHANGE UP
BASOPHILS # BLD AUTO: 0 K/UL — SIGNIFICANT CHANGE UP (ref 0–0.2)
BASOPHILS NFR BLD AUTO: 0 % — SIGNIFICANT CHANGE UP (ref 0–2)
BILIRUB SERPL-MCNC: <0.2 MG/DL — LOW (ref 0.4–2)
BUN SERPL-MCNC: 8.2 MG/DL — SIGNIFICANT CHANGE UP (ref 8–20)
CALCIUM SERPL-MCNC: 8.4 MG/DL — SIGNIFICANT CHANGE UP (ref 8.4–10.5)
CHLORIDE SERPL-SCNC: 108 MMOL/L — HIGH (ref 98–107)
CO2 SERPL-SCNC: 27 MMOL/L — SIGNIFICANT CHANGE UP (ref 22–29)
CREAT SERPL-MCNC: 0.98 MG/DL — SIGNIFICANT CHANGE UP (ref 0.5–1.3)
EGFR: 65 ML/MIN/1.73M2 — SIGNIFICANT CHANGE UP
EOSINOPHIL # BLD AUTO: 5.76 K/UL — HIGH (ref 0–0.5)
EOSINOPHIL NFR BLD AUTO: 51.3 % — HIGH (ref 0–6)
GIANT PLATELETS BLD QL SMEAR: PRESENT — SIGNIFICANT CHANGE UP
GLUCOSE SERPL-MCNC: 90 MG/DL — SIGNIFICANT CHANGE UP (ref 70–99)
HCT VFR BLD CALC: 32.9 % — LOW (ref 34.5–45)
HGB BLD-MCNC: 10.7 G/DL — LOW (ref 11.5–15.5)
HYPOCHROMIA BLD QL: SLIGHT — SIGNIFICANT CHANGE UP
LYMPHOCYTES # BLD AUTO: 2.54 K/UL — SIGNIFICANT CHANGE UP (ref 1–3.3)
LYMPHOCYTES # BLD AUTO: 22.6 % — SIGNIFICANT CHANGE UP (ref 13–44)
MANUAL SMEAR VERIFICATION: SIGNIFICANT CHANGE UP
MCHC RBC-ENTMCNC: 31.4 PG — SIGNIFICANT CHANGE UP (ref 27–34)
MCHC RBC-ENTMCNC: 32.5 GM/DL — SIGNIFICANT CHANGE UP (ref 32–36)
MCV RBC AUTO: 96.5 FL — SIGNIFICANT CHANGE UP (ref 80–100)
MONOCYTES # BLD AUTO: 0.29 K/UL — SIGNIFICANT CHANGE UP (ref 0–0.9)
MONOCYTES NFR BLD AUTO: 2.6 % — SIGNIFICANT CHANGE UP (ref 2–14)
NEUTROPHILS # BLD AUTO: 2.64 K/UL — SIGNIFICANT CHANGE UP (ref 1.8–7.4)
NEUTROPHILS NFR BLD AUTO: 23.5 % — LOW (ref 43–77)
PLAT MORPH BLD: NORMAL — SIGNIFICANT CHANGE UP
PLATELET # BLD AUTO: 318 K/UL — SIGNIFICANT CHANGE UP (ref 150–400)
POIKILOCYTOSIS BLD QL AUTO: SLIGHT — SIGNIFICANT CHANGE UP
POLYCHROMASIA BLD QL SMEAR: SLIGHT — SIGNIFICANT CHANGE UP
POTASSIUM SERPL-MCNC: 4.5 MMOL/L — SIGNIFICANT CHANGE UP (ref 3.5–5.3)
POTASSIUM SERPL-SCNC: 4.5 MMOL/L — SIGNIFICANT CHANGE UP (ref 3.5–5.3)
PROT SERPL-MCNC: 5.2 G/DL — LOW (ref 6.6–8.7)
RBC # BLD: 3.41 M/UL — LOW (ref 3.8–5.2)
RBC # FLD: 15.1 % — HIGH (ref 10.3–14.5)
RBC BLD AUTO: ABNORMAL
SCHISTOCYTES BLD QL AUTO: SLIGHT — SIGNIFICANT CHANGE UP
SODIUM SERPL-SCNC: 142 MMOL/L — SIGNIFICANT CHANGE UP (ref 135–145)
WBC # BLD: 11.23 K/UL — HIGH (ref 3.8–10.5)
WBC # FLD AUTO: 11.23 K/UL — HIGH (ref 3.8–10.5)

## 2022-09-03 PROCEDURE — 86703 HIV-1/HIV-2 1 RESULT ANTBDY: CPT

## 2022-09-03 PROCEDURE — 81003 URINALYSIS AUTO W/O SCOPE: CPT

## 2022-09-03 PROCEDURE — 93005 ELECTROCARDIOGRAM TRACING: CPT

## 2022-09-03 PROCEDURE — 87086 URINE CULTURE/COLONY COUNT: CPT

## 2022-09-03 PROCEDURE — 85652 RBC SED RATE AUTOMATED: CPT

## 2022-09-03 PROCEDURE — 87040 BLOOD CULTURE FOR BACTERIA: CPT

## 2022-09-03 PROCEDURE — 85730 THROMBOPLASTIN TIME PARTIAL: CPT

## 2022-09-03 PROCEDURE — 71275 CT ANGIOGRAPHY CHEST: CPT | Mod: MA

## 2022-09-03 PROCEDURE — 82803 BLOOD GASES ANY COMBINATION: CPT

## 2022-09-03 PROCEDURE — 83605 ASSAY OF LACTIC ACID: CPT

## 2022-09-03 PROCEDURE — 96368 THER/DIAG CONCURRENT INF: CPT

## 2022-09-03 PROCEDURE — 0225U NFCT DS DNA&RNA 21 SARSCOV2: CPT

## 2022-09-03 PROCEDURE — 96366 THER/PROPH/DIAG IV INF ADDON: CPT

## 2022-09-03 PROCEDURE — 86803 HEPATITIS C AB TEST: CPT

## 2022-09-03 PROCEDURE — 71045 X-RAY EXAM CHEST 1 VIEW: CPT

## 2022-09-03 PROCEDURE — 70490 CT SOFT TISSUE NECK W/O DYE: CPT

## 2022-09-03 PROCEDURE — 94640 AIRWAY INHALATION TREATMENT: CPT

## 2022-09-03 PROCEDURE — C8929: CPT

## 2022-09-03 PROCEDURE — 83735 ASSAY OF MAGNESIUM: CPT

## 2022-09-03 PROCEDURE — 96365 THER/PROPH/DIAG IV INF INIT: CPT

## 2022-09-03 PROCEDURE — 94760 N-INVAS EAR/PLS OXIMETRY 1: CPT

## 2022-09-03 PROCEDURE — 99239 HOSP IP/OBS DSCHRG MGMT >30: CPT

## 2022-09-03 PROCEDURE — 85025 COMPLETE CBC W/AUTO DIFF WBC: CPT

## 2022-09-03 PROCEDURE — 84484 ASSAY OF TROPONIN QUANT: CPT

## 2022-09-03 PROCEDURE — 99285 EMERGENCY DEPT VISIT HI MDM: CPT

## 2022-09-03 PROCEDURE — 96367 TX/PROPH/DG ADDL SEQ IV INF: CPT

## 2022-09-03 PROCEDURE — 96375 TX/PRO/DX INJ NEW DRUG ADDON: CPT

## 2022-09-03 PROCEDURE — 80053 COMPREHEN METABOLIC PANEL: CPT

## 2022-09-03 PROCEDURE — 36415 COLL VENOUS BLD VENIPUNCTURE: CPT

## 2022-09-03 PROCEDURE — 86140 C-REACTIVE PROTEIN: CPT

## 2022-09-03 PROCEDURE — 83880 ASSAY OF NATRIURETIC PEPTIDE: CPT

## 2022-09-03 PROCEDURE — 85610 PROTHROMBIN TIME: CPT

## 2022-09-03 PROCEDURE — 74177 CT ABD & PELVIS W/CONTRAST: CPT | Mod: MA

## 2022-09-03 RX ORDER — PANTOPRAZOLE SODIUM 20 MG/1
1 TABLET, DELAYED RELEASE ORAL
Qty: 14 | Refills: 0
Start: 2022-09-03 | End: 2022-09-16

## 2022-09-03 RX ORDER — CEPHALEXIN 500 MG
1 CAPSULE ORAL
Qty: 28 | Refills: 0
Start: 2022-09-03 | End: 2022-09-09

## 2022-09-03 RX ORDER — ALPRAZOLAM 0.25 MG
0 TABLET ORAL
Qty: 0 | Refills: 0 | DISCHARGE

## 2022-09-03 RX ORDER — LACTOBACILLUS ACIDOPHILUS 100MM CELL
1 CAPSULE ORAL
Qty: 14 | Refills: 0
Start: 2022-09-03 | End: 2022-09-09

## 2022-09-03 RX ORDER — CEPHALEXIN 500 MG
1 CAPSULE ORAL
Qty: 14 | Refills: 0
Start: 2022-09-03 | End: 2022-09-09

## 2022-09-03 RX ORDER — ALPRAZOLAM 0.25 MG
1.5 TABLET ORAL
Qty: 0 | Refills: 0 | DISCHARGE
Start: 2022-09-03

## 2022-09-03 RX ORDER — ALPRAZOLAM 0.25 MG
1 TABLET ORAL
Qty: 0 | Refills: 0 | DISCHARGE
Start: 2022-09-03

## 2022-09-03 RX ADMIN — Medication 3 MILLILITER(S): at 13:54

## 2022-09-03 RX ADMIN — Medication 1 TABLET(S): at 17:33

## 2022-09-03 RX ADMIN — APIXABAN 5 MILLIGRAM(S): 2.5 TABLET, FILM COATED ORAL at 05:13

## 2022-09-03 RX ADMIN — Medication 3 MILLILITER(S): at 08:54

## 2022-09-03 RX ADMIN — PIPERACILLIN AND TAZOBACTAM 25 GRAM(S): 4; .5 INJECTION, POWDER, LYOPHILIZED, FOR SOLUTION INTRAVENOUS at 05:15

## 2022-09-03 RX ADMIN — PANTOPRAZOLE SODIUM 40 MILLIGRAM(S): 20 TABLET, DELAYED RELEASE ORAL at 05:13

## 2022-09-03 RX ADMIN — Medication 81 MILLIGRAM(S): at 11:26

## 2022-09-03 RX ADMIN — GABAPENTIN 100 MILLIGRAM(S): 400 CAPSULE ORAL at 17:33

## 2022-09-03 RX ADMIN — Medication 40 MILLIGRAM(S): at 05:12

## 2022-09-03 RX ADMIN — Medication 1 TABLET(S): at 09:23

## 2022-09-03 RX ADMIN — PIPERACILLIN AND TAZOBACTAM 25 GRAM(S): 4; .5 INJECTION, POWDER, LYOPHILIZED, FOR SOLUTION INTRAVENOUS at 12:58

## 2022-09-03 RX ADMIN — GABAPENTIN 100 MILLIGRAM(S): 400 CAPSULE ORAL at 05:13

## 2022-09-03 RX ADMIN — APIXABAN 5 MILLIGRAM(S): 2.5 TABLET, FILM COATED ORAL at 17:33

## 2022-09-03 NOTE — DISCHARGE NOTE PROVIDER - HOSPITAL COURSE
Patient is a 63 yo female with a PMH of anxiety, HLD, neuropathy, DVT on eliquis who presents to ED with severe chills, generalized weakness, and productive cough for the past week. In the ED, patient was found to be hypoxic, hypotensive, and febrile.      #SIRS (Systemic Inflammatory Response Syndrome) with Reactive Leukocytosis due to Facial Cellulitis   -blood and urine culture negative  -rapid HIV, SARS-CoV2, and RVP negative    -switch to PO keflex per ID recs  -c/w IV fluids and monitor BP     -CT neck shows facial cellulitis vs calcified fillers  -ID recs appreciated; continue abx as above and follow up with plastics within 1 week    #Chest Pain  - resolved  -serial troponin remain negative   -c/w aspirin and statin    -EKG showed prolonged QT   -TTE reviewed  -Cardio signed off given non cardiac chest pain    #Hypoxemia  - resolved  -weaned off O2   -unclear etiology   -CXR, CTA unremarkable; no sign of PE    -pulmonology recs appreciated; continue quick prednisone taper    #Hx of Arterial Thrombosis    -continue eliquis      #Anxiety   -c/x xanax 0.5mg BID PRN      #Cervical Disc Degeneration  -patient with a history of cervical spine surgery  -denies any acute issues  -imaging reviewed; recommended outpatient follow up with ortho spine    Patient is medically stable for discharge home.

## 2022-09-03 NOTE — DISCHARGE NOTE PROVIDER - NSDCCPCAREPLAN_GEN_ALL_CORE_FT
PRINCIPAL DISCHARGE DIAGNOSIS  Diagnosis: Hypoxemia  Assessment and Plan of Treatment: possibly due to reactive airway disease  now resolved  prednisone taper recommended by pulmonary  please follow up with your PCP within 1 week      SECONDARY DISCHARGE DIAGNOSES  Diagnosis: Chest pain  Assessment and Plan of Treatment: resolved  follow up with your PCP and cardiologist as outpatient    Diagnosis: History of arterial thrombosis  Assessment and Plan of Treatment: continue  eliquis and follow up with your PCP within 1 week    Diagnosis: Anxiety  Assessment and Plan of Treatment: continue xanax as needed    Diagnosis: Sepsis  Assessment and Plan of Treatment: due to facial cellulitis in the setting of fillers  please continue keflex as directed until 9/9 along with a probiotic  follow up with your plastic surgeon and PCP within 1 week  please also follow up with your dentist    Diagnosis: Degenerative cervical disc  Assessment and Plan of Treatment: please follow up with your spine surgeon within 1-2 weeks given degenerative changes in your cervical spine

## 2022-09-03 NOTE — DISCHARGE NOTE PROVIDER - CARE PROVIDERS DIRECT ADDRESSES
,DirectAddress_Unknown,[email or web address]

## 2022-09-03 NOTE — DISCHARGE NOTE NURSING/CASE MANAGEMENT/SOCIAL WORK - PATIENT PORTAL LINK FT
You can access the FollowMyHealth Patient Portal offered by [facility] by registering at the following website: [email or web address]. By joining [employer]’s FollowMyHealth portal, you will also be able to view your health information using other applications (apps) compatible with our system.

## 2022-09-03 NOTE — DISCHARGE NOTE PROVIDER - CARE PROVIDER_API CALL
Gus Ji)  Critical Care Medicine; Internal Medicine; Pulmonary Disease  [address]  [place], [place] [id number]  Phone: [phone]  Fax: [phone]  Follow Up Time:     Elbert Guillen)  Infectious Disease; Internal Medicine  [address] [place] [address]  Phone: [phone]  Fax: [phone]  Follow Up Time:

## 2022-09-03 NOTE — DISCHARGE NOTE PROVIDER - ATTENDING DISCHARGE PHYSICAL EXAMINATION:
Constitutional: middle aged female, sitting in bed, NAD  HEENT: facial fillers noted; mild erythema over left cheek   Lungs: bilateral air entry, no wheezing  Cardio: RRR, S1/S2   Abdomen: Soft, Nontender, Nondistended   Extremities: No edema  Neuro: Awake and alert, oriented x 4, no focal deficits

## 2022-09-03 NOTE — DISCHARGE NOTE PROVIDER - NSDCFUSCHEDAPPT_GEN_ALL_CORE_FT
[facility]  [facility] [address]  Scheduled Appointment: 09/06/2022

## 2022-09-03 NOTE — DISCHARGE NOTE NURSING/CASE MANAGEMENT/SOCIAL WORK - NSDCPEFALRISK_GEN_ALL_CORE
For information on Fall & Injury Prevention, visit: [email or web address] OR  [email or web address] OR  https://www.cdc.gov/steadi/patient.html

## 2022-09-03 NOTE — DISCHARGE NOTE PROVIDER - NSDCMRMEDTOKEN_GEN_ALL_CORE_FT
Randolph Dacosta (:  1960) is a 60 y.o. female,Established patient, here for evaluation of the following chief complaint(s):  Follow-up (depression/insomia )         ASSESSMENT/PLAN:  1. Intractable migraine with aura without status migrainosus  Worsening symptoms with failure of 3 triptans   -     Rimegepant Sulfate (NURTEC) 75 MG TBDP; Take 1 tablet by mouth every 48 hours as needed (migraine), Disp-6 tablet, R-0Normal    2. Primary insomnia  -     eszopiclone (ESZOPICLONE) 3 MG TABS; Take 1 tablet by mouth nightly as needed (insomnia) for up to 30 days. , Disp-30 tablet, R-0Normal      Return in about 1 month (around 2022) for migraine. Subjective   SUBJECTIVE/OBJECTIVE:  Migraine   This is a new problem. The current episode started 1 to 4 weeks ago. The problem occurs intermittently. The problem has been gradually worsening. The pain is located in the right unilateral region. The pain quality is similar to prior headaches. Associated symptoms include nausea and photophobia. Pertinent negatives include no dizziness, ear pain, hearing loss or tingling. She has tried triptans and antidepressants for the symptoms. patient reports having 3 migraine days per week. She has tried tried eletriptan, rizatriptan and sumatriptan without success. Review of Systems   HENT: Negative for ear pain and hearing loss. Eyes: Positive for photophobia. Gastrointestinal: Positive for nausea. Neurological: Negative for dizziness and tingling. Objective    Vitals:    22 1537   BP: 126/76   Pulse: 97   Weight: 212 lb (96.2 kg)   Height: 5' 2\" (1.575 m)   PF: 97 L/min      Wt Readings from Last 3 Encounters:   22 212 lb (96.2 kg)   21 209 lb (94.8 kg)   21 200 lb 3.2 oz (90.8 kg)     BP Readings from Last 3 Encounters:   22 126/76   21 116/82   21 130/80     Body mass index is 38.78 kg/m². Facility age limit for growth percentiles is 20 years.    Physical Exam  Constitutional:       Appearance: Normal appearance. HENT:      Head: Normocephalic and atraumatic. Right Ear: Tympanic membrane normal.      Left Ear: Tympanic membrane normal.      Nose: Nose normal. No congestion. Mouth/Throat:      Mouth: Mucous membranes are moist.      Pharynx: No oropharyngeal exudate or posterior oropharyngeal erythema. Eyes:      General:         Right eye: No discharge. Left eye: No discharge. Pupils: Pupils are equal, round, and reactive to light. Cardiovascular:      Rate and Rhythm: Normal rate and regular rhythm. Pulmonary:      Effort: Pulmonary effort is normal. No respiratory distress. Breath sounds: No stridor. No wheezing or rhonchi. Musculoskeletal:      Cervical back: Normal range of motion and neck supple. Neurological:      Mental Status: She is alert. An electronic signature was used to authenticate this note.     --Sheng Julien MD ALPRAZolam 1 mg oral tablet: 1 tab(s) orally once a day (at bedtime), As needed, Anxierty  aspirin 81 mg oral tablet: orally once a day  atorvastatin 20 mg oral tablet: 1 tab(s) orally once a day  cephalexin 500 mg oral tablet: 1 tab(s) orally 2 times a day   Eliquis 5 mg oral tablet: 1 tab(s) orally 2 times a day  gabapentin 100 mg oral tablet: 1  orally 2 times a day  lactobacillus acidophilus oral capsule: 1 tab(s) orally 2 times a day   pantoprazole 40 mg oral delayed release tablet: 1 tab(s) orally once a day (before a meal)  predniSONE 10 mg oral tablet: 3 tab(s) orally once a day x 2 days  2 tab(s) orally once a day x 2 days  1 tab(s) orally once a day x 2 days   ALPRAZolam 1 mg oral tablet: 1 tab(s) orally once a day (at bedtime), As needed, Anxierty  aspirin 81 mg oral tablet: orally once a day  atorvastatin 20 mg oral tablet: 1 tab(s) orally once a day  cephalexin 500 mg oral tablet: 1 tab(s) orally 4 times a day   Eliquis 5 mg oral tablet: 1 tab(s) orally 2 times a day  gabapentin 100 mg oral tablet: 1  orally 2 times a day  lactobacillus acidophilus oral capsule: 1 tab(s) orally 2 times a day   pantoprazole 40 mg oral delayed release tablet: 1 tab(s) orally once a day (before a meal)  predniSONE 10 mg oral tablet: 3 tab(s) orally once a day x 2 days  2 tab(s) orally once a day x 2 days  1 tab(s) orally once a day x 2 days

## 2022-09-05 LAB
CULTURE RESULTS: SIGNIFICANT CHANGE UP
CULTURE RESULTS: SIGNIFICANT CHANGE UP
SPECIMEN SOURCE: SIGNIFICANT CHANGE UP
SPECIMEN SOURCE: SIGNIFICANT CHANGE UP

## 2022-09-06 ENCOUNTER — NON-APPOINTMENT (OUTPATIENT)
Age: 62
End: 2022-09-06

## 2022-09-06 ENCOUNTER — APPOINTMENT (OUTPATIENT)
Dept: FAMILY MEDICINE | Facility: CLINIC | Age: 62
End: 2022-09-06

## 2022-09-06 PROBLEM — F41.9 ANXIETY DISORDER, UNSPECIFIED: Chronic | Status: ACTIVE | Noted: 2022-08-31

## 2022-09-06 PROBLEM — Z86.718 PERSONAL HISTORY OF OTHER VENOUS THROMBOSIS AND EMBOLISM: Chronic | Status: ACTIVE | Noted: 2022-08-31

## 2022-09-06 PROBLEM — G62.9 POLYNEUROPATHY, UNSPECIFIED: Chronic | Status: ACTIVE | Noted: 2022-08-31

## 2022-09-06 PROBLEM — E78.5 HYPERLIPIDEMIA, UNSPECIFIED: Chronic | Status: ACTIVE | Noted: 2022-08-31

## 2022-09-12 ENCOUNTER — INPATIENT (INPATIENT)
Facility: HOSPITAL | Age: 62
LOS: 1 days | Discharge: HOME | DRG: 916 | End: 2022-09-14
Payer: COMMERCIAL

## 2022-09-12 VITALS
HEART RATE: 84 BPM | RESPIRATION RATE: 18 BRPM | DIASTOLIC BLOOD PRESSURE: 80 MMHG | WEIGHT: 145.06 LBS | HEIGHT: 64 IN | TEMPERATURE: 98 F | SYSTOLIC BLOOD PRESSURE: 113 MMHG | OXYGEN SATURATION: 97 %

## 2022-09-12 DIAGNOSIS — Z98.890 OTHER SPECIFIED POSTPROCEDURAL STATES: Chronic | ICD-10-CM

## 2022-09-12 DIAGNOSIS — T78.2XXA ANAPHYLACTIC SHOCK, UNSPECIFIED, INITIAL ENCOUNTER: ICD-10-CM

## 2022-09-12 DIAGNOSIS — Z87.81 PERSONAL HISTORY OF (HEALED) TRAUMATIC FRACTURE: Chronic | ICD-10-CM

## 2022-09-12 LAB
ALBUMIN SERPL ELPH-MCNC: 3.5 G/DL — SIGNIFICANT CHANGE UP (ref 3.3–5.2)
ALP SERPL-CCNC: 83 U/L — SIGNIFICANT CHANGE UP (ref 40–120)
ALT FLD-CCNC: 23 U/L — SIGNIFICANT CHANGE UP
ANION GAP SERPL CALC-SCNC: 12 MMOL/L — SIGNIFICANT CHANGE UP (ref 5–17)
ANION GAP SERPL CALC-SCNC: 13 MMOL/L — SIGNIFICANT CHANGE UP (ref 5–17)
APPEARANCE UR: CLEAR — SIGNIFICANT CHANGE UP
AST SERPL-CCNC: 37 U/L — HIGH
BASOPHILS # BLD AUTO: 0.02 K/UL — SIGNIFICANT CHANGE UP (ref 0–0.2)
BASOPHILS NFR BLD AUTO: 0.2 % — SIGNIFICANT CHANGE UP (ref 0–2)
BILIRUB SERPL-MCNC: 0.6 MG/DL — SIGNIFICANT CHANGE UP (ref 0.4–2)
BILIRUB UR-MCNC: NEGATIVE — SIGNIFICANT CHANGE UP
BUN SERPL-MCNC: 23.7 MG/DL — HIGH (ref 8–20)
BUN SERPL-MCNC: 24.8 MG/DL — HIGH (ref 8–20)
CALCIUM SERPL-MCNC: 8.3 MG/DL — LOW (ref 8.4–10.5)
CALCIUM SERPL-MCNC: 8.9 MG/DL — SIGNIFICANT CHANGE UP (ref 8.4–10.5)
CHLORIDE SERPL-SCNC: 102 MMOL/L — SIGNIFICANT CHANGE UP (ref 98–107)
CHLORIDE SERPL-SCNC: 105 MMOL/L — SIGNIFICANT CHANGE UP (ref 98–107)
CO2 SERPL-SCNC: 20 MMOL/L — LOW (ref 22–29)
CO2 SERPL-SCNC: 23 MMOL/L — SIGNIFICANT CHANGE UP (ref 22–29)
COLOR SPEC: YELLOW — SIGNIFICANT CHANGE UP
CREAT SERPL-MCNC: 1.29 MG/DL — SIGNIFICANT CHANGE UP (ref 0.5–1.3)
CREAT SERPL-MCNC: 1.55 MG/DL — HIGH (ref 0.5–1.3)
DIFF PNL FLD: ABNORMAL
EGFR: 37 ML/MIN/1.73M2 — LOW
EGFR: 47 ML/MIN/1.73M2 — LOW
EOSINOPHIL # BLD AUTO: 0.27 K/UL — SIGNIFICANT CHANGE UP (ref 0–0.5)
EOSINOPHIL NFR BLD AUTO: 2.8 % — SIGNIFICANT CHANGE UP (ref 0–6)
EPI CELLS # UR: SIGNIFICANT CHANGE UP
GLUCOSE SERPL-MCNC: 147 MG/DL — HIGH (ref 70–99)
GLUCOSE SERPL-MCNC: 152 MG/DL — HIGH (ref 70–99)
GLUCOSE UR QL: NEGATIVE MG/DL — SIGNIFICANT CHANGE UP
HCT VFR BLD CALC: 44.6 % — SIGNIFICANT CHANGE UP (ref 34.5–45)
HCT VFR BLD CALC: 48.7 % — HIGH (ref 34.5–45)
HGB BLD-MCNC: 14.9 G/DL — SIGNIFICANT CHANGE UP (ref 11.5–15.5)
HGB BLD-MCNC: 16.2 G/DL — HIGH (ref 11.5–15.5)
IMM GRANULOCYTES NFR BLD AUTO: 0.3 % — SIGNIFICANT CHANGE UP (ref 0–1.5)
KETONES UR-MCNC: ABNORMAL
LEUKOCYTE ESTERASE UR-ACNC: NEGATIVE — SIGNIFICANT CHANGE UP
LIDOCAIN IGE QN: 12 U/L — LOW (ref 22–51)
LYMPHOCYTES # BLD AUTO: 1.39 K/UL — SIGNIFICANT CHANGE UP (ref 1–3.3)
LYMPHOCYTES # BLD AUTO: 14.4 % — SIGNIFICANT CHANGE UP (ref 13–44)
MCHC RBC-ENTMCNC: 31.2 PG — SIGNIFICANT CHANGE UP (ref 27–34)
MCHC RBC-ENTMCNC: 31.2 PG — SIGNIFICANT CHANGE UP (ref 27–34)
MCHC RBC-ENTMCNC: 33.3 GM/DL — SIGNIFICANT CHANGE UP (ref 32–36)
MCHC RBC-ENTMCNC: 33.4 GM/DL — SIGNIFICANT CHANGE UP (ref 32–36)
MCV RBC AUTO: 93.5 FL — SIGNIFICANT CHANGE UP (ref 80–100)
MCV RBC AUTO: 93.8 FL — SIGNIFICANT CHANGE UP (ref 80–100)
MONOCYTES # BLD AUTO: 0.47 K/UL — SIGNIFICANT CHANGE UP (ref 0–0.9)
MONOCYTES NFR BLD AUTO: 4.9 % — SIGNIFICANT CHANGE UP (ref 2–14)
NEUTROPHILS # BLD AUTO: 7.45 K/UL — HIGH (ref 1.8–7.4)
NEUTROPHILS NFR BLD AUTO: 77.4 % — HIGH (ref 43–77)
NITRITE UR-MCNC: NEGATIVE — SIGNIFICANT CHANGE UP
PH UR: 5 — SIGNIFICANT CHANGE UP (ref 5–8)
PLATELET # BLD AUTO: 333 K/UL — SIGNIFICANT CHANGE UP (ref 150–400)
PLATELET # BLD AUTO: 377 K/UL — SIGNIFICANT CHANGE UP (ref 150–400)
POTASSIUM SERPL-MCNC: 4.8 MMOL/L — SIGNIFICANT CHANGE UP (ref 3.5–5.3)
POTASSIUM SERPL-MCNC: 5.7 MMOL/L — HIGH (ref 3.5–5.3)
POTASSIUM SERPL-SCNC: 4.8 MMOL/L — SIGNIFICANT CHANGE UP (ref 3.5–5.3)
POTASSIUM SERPL-SCNC: 5.7 MMOL/L — HIGH (ref 3.5–5.3)
PROT SERPL-MCNC: 6.6 G/DL — SIGNIFICANT CHANGE UP (ref 6.6–8.7)
PROT UR-MCNC: 15
RBC # BLD: 4.77 M/UL — SIGNIFICANT CHANGE UP (ref 3.8–5.2)
RBC # BLD: 5.19 M/UL — SIGNIFICANT CHANGE UP (ref 3.8–5.2)
RBC # FLD: 14.3 % — SIGNIFICANT CHANGE UP (ref 10.3–14.5)
RBC # FLD: 14.4 % — SIGNIFICANT CHANGE UP (ref 10.3–14.5)
RBC CASTS # UR COMP ASSIST: SIGNIFICANT CHANGE UP /HPF (ref 0–4)
SODIUM SERPL-SCNC: 136 MMOL/L — SIGNIFICANT CHANGE UP (ref 135–145)
SODIUM SERPL-SCNC: 138 MMOL/L — SIGNIFICANT CHANGE UP (ref 135–145)
SP GR SPEC: 1.02 — SIGNIFICANT CHANGE UP (ref 1.01–1.02)
UROBILINOGEN FLD QL: NEGATIVE MG/DL — SIGNIFICANT CHANGE UP
WBC # BLD: 10.85 K/UL — HIGH (ref 3.8–10.5)
WBC # BLD: 9.63 K/UL — SIGNIFICANT CHANGE UP (ref 3.8–10.5)
WBC # FLD AUTO: 10.85 K/UL — HIGH (ref 3.8–10.5)
WBC # FLD AUTO: 9.63 K/UL — SIGNIFICANT CHANGE UP (ref 3.8–10.5)
WBC UR QL: NEGATIVE /HPF — SIGNIFICANT CHANGE UP (ref 0–5)

## 2022-09-12 PROCEDURE — 93010 ELECTROCARDIOGRAM REPORT: CPT

## 2022-09-12 PROCEDURE — 74177 CT ABD & PELVIS W/CONTRAST: CPT | Mod: 26

## 2022-09-12 PROCEDURE — 99285 EMERGENCY DEPT VISIT HI MDM: CPT

## 2022-09-12 PROCEDURE — 99223 1ST HOSP IP/OBS HIGH 75: CPT

## 2022-09-12 RX ORDER — GABAPENTIN 400 MG/1
300 CAPSULE ORAL DAILY
Refills: 0 | Status: DISCONTINUED | OUTPATIENT
Start: 2022-09-12 | End: 2022-09-14

## 2022-09-12 RX ORDER — MEPROBAMATE 400 MG/1
1 TABLET ORAL
Qty: 0 | Refills: 0 | DISCHARGE

## 2022-09-12 RX ORDER — ALPRAZOLAM 0.25 MG
1.5 TABLET ORAL AT BEDTIME
Refills: 0 | Status: DISCONTINUED | OUTPATIENT
Start: 2022-09-12 | End: 2022-09-14

## 2022-09-12 RX ORDER — ATORVASTATIN CALCIUM 80 MG/1
1 TABLET, FILM COATED ORAL
Qty: 0 | Refills: 0 | DISCHARGE

## 2022-09-12 RX ORDER — ACETAMINOPHEN 500 MG
1000 TABLET ORAL ONCE
Refills: 0 | Status: COMPLETED | OUTPATIENT
Start: 2022-09-12 | End: 2022-09-12

## 2022-09-12 RX ORDER — FAMOTIDINE 10 MG/ML
20 INJECTION INTRAVENOUS ONCE
Refills: 0 | Status: COMPLETED | OUTPATIENT
Start: 2022-09-12 | End: 2022-09-12

## 2022-09-12 RX ORDER — EPINEPHRINE 0.3 MG/.3ML
0.3 INJECTION INTRAMUSCULAR; SUBCUTANEOUS ONCE
Refills: 0 | Status: COMPLETED | OUTPATIENT
Start: 2022-09-12 | End: 2022-09-12

## 2022-09-12 RX ORDER — FAMOTIDINE 10 MG/ML
20 INJECTION INTRAVENOUS DAILY
Refills: 0 | Status: DISCONTINUED | OUTPATIENT
Start: 2022-09-12 | End: 2022-09-12

## 2022-09-12 RX ORDER — ACETAMINOPHEN 500 MG
650 TABLET ORAL EVERY 6 HOURS
Refills: 0 | Status: DISCONTINUED | OUTPATIENT
Start: 2022-09-12 | End: 2022-09-14

## 2022-09-12 RX ORDER — DIPHENHYDRAMINE HCL 50 MG
25 CAPSULE ORAL ONCE
Refills: 0 | Status: DISCONTINUED | OUTPATIENT
Start: 2022-09-12 | End: 2022-09-12

## 2022-09-12 RX ORDER — ONDANSETRON 8 MG/1
4 TABLET, FILM COATED ORAL EVERY 6 HOURS
Refills: 0 | Status: DISCONTINUED | OUTPATIENT
Start: 2022-09-12 | End: 2022-09-14

## 2022-09-12 RX ORDER — HYDROCORTISONE 20 MG
40 TABLET ORAL
Refills: 0 | Status: DISCONTINUED | OUTPATIENT
Start: 2022-09-12 | End: 2022-09-12

## 2022-09-12 RX ORDER — LORATADINE 10 MG/1
10 TABLET ORAL DAILY
Refills: 0 | Status: DISCONTINUED | OUTPATIENT
Start: 2022-09-12 | End: 2022-09-12

## 2022-09-12 RX ORDER — APIXABAN 2.5 MG/1
5 TABLET, FILM COATED ORAL EVERY 12 HOURS
Refills: 0 | Status: DISCONTINUED | OUTPATIENT
Start: 2022-09-12 | End: 2022-09-14

## 2022-09-12 RX ORDER — SODIUM CHLORIDE 9 MG/ML
500 INJECTION INTRAMUSCULAR; INTRAVENOUS; SUBCUTANEOUS ONCE
Refills: 0 | Status: COMPLETED | OUTPATIENT
Start: 2022-09-12 | End: 2022-09-12

## 2022-09-12 RX ORDER — PANTOPRAZOLE SODIUM 20 MG/1
40 TABLET, DELAYED RELEASE ORAL
Refills: 0 | Status: DISCONTINUED | OUTPATIENT
Start: 2022-09-12 | End: 2022-09-14

## 2022-09-12 RX ORDER — ATORVASTATIN CALCIUM 80 MG/1
40 TABLET, FILM COATED ORAL AT BEDTIME
Refills: 0 | Status: DISCONTINUED | OUTPATIENT
Start: 2022-09-12 | End: 2022-09-14

## 2022-09-12 RX ORDER — DIPHENHYDRAMINE HCL 50 MG
25 CAPSULE ORAL EVERY 4 HOURS
Refills: 0 | Status: DISCONTINUED | OUTPATIENT
Start: 2022-09-12 | End: 2022-09-14

## 2022-09-12 RX ORDER — GABAPENTIN 400 MG/1
1 CAPSULE ORAL
Qty: 0 | Refills: 0 | DISCHARGE

## 2022-09-12 RX ORDER — LORATADINE 10 MG/1
10 TABLET ORAL DAILY
Refills: 0 | Status: DISCONTINUED | OUTPATIENT
Start: 2022-09-12 | End: 2022-09-14

## 2022-09-12 RX ORDER — ASPIRIN/CALCIUM CARB/MAGNESIUM 324 MG
81 TABLET ORAL DAILY
Refills: 0 | Status: DISCONTINUED | OUTPATIENT
Start: 2022-09-12 | End: 2022-09-14

## 2022-09-12 RX ADMIN — Medication 25 MILLIGRAM(S): at 13:11

## 2022-09-12 RX ADMIN — EPINEPHRINE 0.3 MILLIGRAM(S): 0.3 INJECTION INTRAMUSCULAR; SUBCUTANEOUS at 13:32

## 2022-09-12 RX ADMIN — Medication 400 MILLIGRAM(S): at 16:59

## 2022-09-12 RX ADMIN — LORATADINE 10 MILLIGRAM(S): 10 TABLET ORAL at 17:59

## 2022-09-12 RX ADMIN — Medication 40 MILLIGRAM(S): at 17:50

## 2022-09-12 RX ADMIN — EPINEPHRINE 0.3 MILLIGRAM(S): 0.3 INJECTION INTRAMUSCULAR; SUBCUTANEOUS at 17:50

## 2022-09-12 RX ADMIN — SODIUM CHLORIDE 500 MILLILITER(S): 9 INJECTION INTRAMUSCULAR; INTRAVENOUS; SUBCUTANEOUS at 15:00

## 2022-09-12 RX ADMIN — SODIUM CHLORIDE 500 MILLILITER(S): 9 INJECTION INTRAMUSCULAR; INTRAVENOUS; SUBCUTANEOUS at 13:11

## 2022-09-12 RX ADMIN — Medication 25 MILLIGRAM(S): at 21:21

## 2022-09-12 RX ADMIN — FAMOTIDINE 20 MILLIGRAM(S): 10 INJECTION INTRAVENOUS at 13:11

## 2022-09-12 NOTE — ED PROVIDER NOTE - GASTROINTESTINAL [-], MLM
"Initial /67 (BP Location: Right arm, Patient Position: Chair, Cuff Size: Adult Small)  Pulse 84  Temp 98.3  F (36.8  C) (Tympanic)  Ht 4' 3.25\" (1.302 m)  Wt 52 lb (23.6 kg)  BMI 13.92 kg/m2 Estimated body mass index is 13.92 kg/(m^2) as calculated from the following:    Height as of this encounter: 4' 3.25\" (1.302 m).    Weight as of this encounter: 52 lb (23.6 kg). .    Louisa Wilde CMA    " no diarrhea/no melena

## 2022-09-12 NOTE — H&P ADULT - NSHPPOADEEPVENOUSTHROMB_GEN_A_CORE
[facility] Hospital Medicine  Discharge Summary      Patient Name: Janine Awad  MRN: [id number]  Admission Date: 10/20/2018  Hospital Length of Stay: 1 days  Discharge Date and Time:  10/21/2018 9:20 AM  Attending Physician: John Rob MD   Discharging Provider: Jessica Pan NP  Primary Care Provider: Oleg Enciso DO  [facility] Medicine Team: [facility]OS[facility] MED J Jessica Pan NP    HPI:   89 y/o F who presents with c/o SSCP (taken from notes, patient cannot recall, no family at bedside).  She has PMH Alzheimer's/ Dementia with 24 hr care, Irritable/ anxiety, Severe AS KALYANI 0.41/ MG 84, MOD/ Severe MR, PAP 47, EF 60-65% (7/26/18 echo), presumed CAD (supposed to get [facility] last year per Cardiologist Dr. Chapman note, but none in computer), HTN, GERD, HLD, Colonic polyps, macular degeneration, hypothyroidism, syncope 2015.      Per notes: Patient noted the acute onset of chest pain while going to the bathroom this evening just prior to arrival.  Localized to the center chest.  Associated with some shortness of breath. Granddaughter states that she appeared in pain. Pain lasted approximately 3-4 minutes and subsided by the time EMS arrived.  Nonexertional nature.  Patient denies any nausea or radiation of the chest pain. Patient endorses episode of upper back pain earlier today around 11:00 p.m. given nitro at that time with relief of symptoms. Patient denies any chest pain at this time.    Patient ecg no ischemia noted, BNP elevated 1306, troponin 0.021/ 0.027.  RA Sat 95%, CXR: Cardiomegaly and mild patchy airspace disease suggestive of edema.    * No surgery found *      Hospital Course:   Admitted to hospital Med J for acute sob/ mild pulm edema, chest pain which appears to be related to her worsening valve area. BNP 1306, troponin's c/w demand ischemia with peak at .027, ecg no ischemia, no ACS/NSTEMI. .  Keshawn admitted to having her caretakers give her  grandmother more fluids than usual, which probably sent her into heart failure coupled with her severe AS.  s/p diuresis of unknown quantity and unknown weight drop as her I/o's were incomplete (not recorded / admit weights were stated).  She responded well to 40mg IV lasix.  She is no longer having chest pains or sob (though unreliable d/t her dementia).  Echo with severe MR which she's been having borderline Mod/ severe for some time now, unable to increase afterload reduction as b/p's are as low at 90's systolic at times.  Her lipid panel is needs more attention, increased lipitor to 80mg.    Discussed case with Dr. Lyons interventional cardiology and her granddaughter.  She has been successfully diuresed and granddaughter is willing to take her to see Dr. Duran/ colleague for outpatient TAVR appt/ consideration.        Dispo: home with granddaughter today.      Consults:   Review of Systems   Unable to perform ROS: Dementia   Constitutional: Negative for activity change, appetite change and fatigue.        Patient answers questions, but cannot remember all details and concern that these are not accurate data points.  She cannot recall where she is, other than she knew she is at the hospital on [facility].  She cannot recall simple math.     HENT: Negative for congestion, rhinorrhea, sinus pressure, sore throat and trouble swallowing.    Eyes: Negative for pain, redness and visual disturbance.   Respiratory: Negative for cough, chest tightness, shortness of breath, wheezing and stridor.    Cardiovascular: Negative for chest pain (admitted with cp but cannot recall having any and currently denies), palpitations and leg swelling.   Gastrointestinal: Negative for abdominal distention, abdominal pain, blood in stool, constipation, diarrhea, nausea and vomiting.   Endocrine: Negative for cold intolerance and heat intolerance.   Genitourinary: Negative for dysuria, frequency, hematuria and urgency.    Musculoskeletal: Negative for arthralgias, back pain, myalgias and neck pain.   Skin: Negative for color change, pallor and rash.   Allergic/Immunologic: Negative for immunocompromised state.   Neurological: Negative for dizziness, tremors, syncope, weakness, light-headedness, numbness and headaches.   Hematological: Does not bruise/bleed easily.   Psychiatric/Behavioral: Positive for confusion. Negative for agitation. The patient is not nervous/anxious.      Physical Exam   Constitutional: She is oriented to person, place, and time. She appears well-developed and well-nourished. No distress.   HENT:   Head: Normocephalic and atraumatic.   Right Ear: External ear normal.   Left Ear: External ear normal.   Nose: Nose normal.   Mouth/Throat: Oropharynx is clear and moist.   Eyes: Conjunctivae and EOM are normal. No scleral icterus.   Neck: Normal range of motion. Neck supple. No JVD or HJR present . No tracheal deviation present. No thyromegaly present.   Cardiovascular: Normal rate, regular rhythm and intact distal pulses. Exam reveals no gallop and no friction rub.   Murmur (III/VI high pitched short harsh blowing aortic murmer c/w AS and mitral area soft blowing  II/VI ) heard.  Pulmonary/Chest: Effort normal. No respiratory distress. She has no wheezes. She has rales (slight bibasilar).   Abdominal: Soft. Bowel sounds are normal. She exhibits no distension and no mass. There is no tenderness. There is no rebound and no guarding.   Musculoskeletal: Normal range of motion. She exhibits no edema or tenderness.   Neurological: She is alert and oriented to person, place, and time. No cranial nerve deficit or sensory deficit. She exhibits normal muscle tone. Coordination normal.   Skin: Skin is warm and dry. No rash noted. No erythema.   Psychiatric: She has a normal mood and affect. Her behavior is normal. Judgment       Severe aortic stenosis    - diuresed unknown quantity d/t I/o's not recorded and admit weight was  stated, discharge weight is standing at 131 lbs.    - Echo findings;   1 - Severe left atrial enlargement.   2 - No wall motion abnormalities.   3 - Normal left ventricular systolic function (EF 55-60%).   4 - Normal right ventricular systolic function .   5 - The estimated PA systolic pressure is 27 mmHg.   6 - Severe aortic valve stenosis (KALYANI 0.30 cm2, AVAi 0.18 cm2/m2, peak aortic jet velocity 5.2 m/s,MG 81 mmHg, ).   7 - Mild aortic regurgitation.   8 - Severe mitral regurgitation.     - refer to Dr. 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  She gave the only copy of her med list to the ED which is no longer in her blue chart and the Granddaughter didn't take it home. She gets her meds through [facility] mail order.     - she needs to f/u with her neurologist upon discharge to review her meds.        Severe mitral valve regurgitation    - echo with severe MR,    - on losartan 25, b/p's range from , encourage afterload reduction but b/p's so labile, do not think she will tolerate much lower b/p with increased dose of losartan.  - needs to have [facility]        Mixed hyperlipidemia    - atorvastatin increased to 80 mg daily  -   Lab Results   Component Value Date    CHOL 237 (H) 10/20/2018    CHOL 187 12/06/2017    CHOL 212 (H) 04/19/2017     Lab Results   Component Value Date    HDL 42 10/20/2018    HDL 52 12/06/2017    HDL 55 04/19/2017     Lab Results   Component Value Date    LDLCALC 168.0 (H) 10/20/2018    LDLCALC 108.8 12/06/2017    LDLCALC 106.8 04/19/2017     Lab Results   Component Value Date    TRIG 135 10/20/2018    TRIG 131 12/06/2017    TRIG 251 (H) 04/19/2017     Lab Results   Component Value Date    CHOLHDL 17.7 (L) 10/20/2018    CHOLHDL 27.8  12/06/2017    CHOLHDL 25.9 04/19/2017          (HFpEF) heart failure with preserved ejection fraction    - diuresed unknown amount   - f/u with TAVR team this week with Dr. 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    Specialties:  Cardiology, INTERVENTIONAL CARDIOLOGY  Contact information:  [address] [place] [id number]  [phone]                 Patient Instructions:      Ambulatory referral to Cardiology   Referral Priority: Routine Referral Type: Consultation   Referral Reason: Specialty Services Required   Referred to Provider: TIM DURAN Requested Specialty: Cardiology   Number of Visits Requested: 1     Diet Cardiac   Order Comments: 1500 ml fluid restriction.     Notify your health care provider if you experience any of the following:  temperature >100.4     Notify your health care provider if you experience any of the following:  persistent nausea and vomiting or diarrhea     Notify your health care provider if you experience any of the following:  severe uncontrolled pain     Notify your health care provider if you experience any of the following:  redness, tenderness, or signs of infection (pain, swelling, redness, odor or green/yellow discharge around incision site)     Notify your health care  provider if you experience any of the following:  difficulty breathing or increased cough     Notify your health care provider if you experience any of the following:  severe persistent headache     Notify your health care provider if you experience any of the following:  worsening rash     Notify your health care provider if you experience any of the following:  persistent dizziness, light-headedness, or visual disturbances     Notify your health care provider if you experience any of the following:  increased confusion or weakness     Activity as tolerated       Significant Diagnostic Studies: Labs:   CMP   Recent Labs   Lab 10/20/18  0355 10/21/18  0418    138   K 4.2 3.8    106   CO2 23 22*   GLU 97 92   BUN 13 17   CREATININE 0.8 0.8   CALCIUM 9.4 9.2   PROT 7.0  --    ALBUMIN 3.1*  --    BILITOT 0.9  --    ALKPHOS 89  --    AST 17  --    ALT 8*  --    ANIONGAP 9 10   ESTGFRAFRICA >60.0 >60.0   EGFRNONAA >60.0 >60.0   , CBC   Recent Labs   Lab 10/20/18  0355   WBC 9.59   HGB 12.0   HCT 36.6*      , Lipid Panel   Lab Results   Component Value Date    CHOL 237 (H) 10/20/2018    HDL 42 10/20/2018    LDLCALC 168.0 (H) 10/20/2018    TRIG 135 10/20/2018    CHOLHDL 17.7 (L) 10/20/2018   , Troponin   Recent Labs   Lab 10/20/18  1259   TROPONINI 0.019    and A1C: No results for input(s): HGBA1C in the last 4320 hours.  Cardiac Graphics: Echocardiogram:   2D echo with color flow doppler:   Results for orders placed or performed during the hospital encounter of 10/20/18   2D echo with color flow doppler   Result Value Ref Range    Calculated EF 55 55 - 65    Mitral Valve Regurgitation SEVERE (A)     Aortic Valve Regurgitation MILD     Aortic Valve Stenosis SEVERE (A)     Est. PA Systolic Pressure 26.62     Mitral Valve Mobility NORMAL     Tricuspid Valve Regurgitation TRIVIAL        Pending Diagnostic Studies:     Procedure Component Value Units Date/Time    Troponin I #2 [[id number]] Collected:   10/20/18 0355    Order Status:  Sent Lab Status:  In process Updated:  10/20/18 0355    Specimen:  Blood          Medications:  Reconciled Home Medications:      Medication List      START taking these medications    nitroGLYCERIN 0.4 MG SL tablet  Commonly known as:  NITROSTAT  Place 1 tablet (0.4 mg total) under the tongue every 5 (five) minutes as needed for Chest pain.        CHANGE how you take these medications    aspirin 81 MG Chew  Take 1 tablet (81 mg total) by mouth once daily.  What changed:  additional instructions     atorvastatin 80 MG tablet  Commonly known as:  LIPITOR  Take 1 tablet (80 mg total) by mouth once daily.  What changed:    · medication strength  · how much to take        CONTINUE taking these medications    artificial tears 0.5 % ophthalmic solution  Commonly known as:  ISOPTO TEARS  Place 1 drop into the left eye 4 (four) times daily.     busPIRone 30 MG Tab  Commonly known as:  BUSPAR  Take 1 tablet (30 mg total) by mouth 2 (two) times daily.     cyanocobalamin 1000 MCG tablet  Commonly known as:  VITAMIN B-12  Take 1 tablet (1,000 mcg total) by mouth once daily.     divalproex 125 mg capsule  Commonly known as:  DEPAKOTE SPRINKLE  Take 1 capsule (125 mg total) by mouth 3 (three) times daily.     fish oil-omega-3 fatty acids 300-1,000 mg capsule  Take 2 g by mouth once daily.     furosemide 20 MG tablet  Commonly known as:  LASIX  TAKE 1 TABLET ONE TIME DAILY     isosorbide mononitrate 30 MG 24 hr tablet  Commonly known as:  IMDUR  TAKE 1 TABLET ONE TIME DAILY     levothyroxine 50 MCG tablet  Commonly known as:  SYNTHROID  TAKE 1 TABLET BEFORE BREAKFAST.     losartan 25 MG tablet  Commonly known as:  COZAAR  Take by mouth.     M-VIT ORAL  Take 1 tablet by mouth.     metoprolol succinate 25 MG 24 hr tablet  Commonly known as:  TOPROL-XL  TAKE 1 TABLET ONE TIME DAILY     omeprazole 20 MG capsule  Commonly known as:  PRILOSEC  Take 20 mg by mouth once daily.     vitamin D 1000 units  Tab  Commonly known as:  VITAMIN D3  Take 185 mg by mouth once daily.        STOP taking these medications    citalopram 10 MG tablet  Commonly known as:  CELEXA            Indwelling Lines/Drains at time of discharge:   Lines/Drains/Airways          None          Time spent on the discharge of patient: 45 minutes  Patient was seen and examined on the date of discharge and determined to be suitable for discharge.         Jessica Pan NP  Department of Hospital Medicine  [facility]  Spectra:  [phone]  Pager: [phone]     no

## 2022-09-12 NOTE — ED PROVIDER NOTE - OBJECTIVE STATEMENT
Is a 63 yo F with PMH of arterial thrombosis, HL, neuropathy, anxiety, presents c/o itchiness and redness of skin of the whole body, started yesterday afternoon (after ate a pizza). Reports Pt does not eat egg and seafood but Pt is not sure if Pt has known allergy. Also reports having runny nose and lower abd pain. Denies HA, CA, n/v/d. Reports she did not start new medications recently. Reports Prednisone induces itchiness. Is a 63 yo F with PMH of arterial thrombosis, HL, neuropathy, anxiety, presents c/o itchiness and redness of skin of the whole body, started yesterday afternoon (after ate a pizza). Reports Pt does not eat egg and seafood but Pt is not sure if Pt has known allergy. Also reports having runny nose, lower abd pain with nausea/vomitting. Denies HA, CA, diarrhea. Reports she did not start new medications recently. Reports Prednisone induces itchiness.

## 2022-09-12 NOTE — ED PROVIDER NOTE - NSICDXPASTMEDICALHX_GEN_ALL_CORE_FT
PAST MEDICAL HISTORY:  Anxiety     History of arterial thrombosis as per pt. had a clot in lower part of her abdominal aorta.    Hyperlipidemia     Neuropathy

## 2022-09-12 NOTE — H&P ADULT - NSHPLABSRESULTS_GEN_ALL_CORE
09-12    136  |  105  |  23.7<H>  ----------------------------<  147<H>  4.8   |  20.0<L>  |  1.29    Ca    8.3<L>      12 Sep 2022 14:45    TPro  6.6  /  Alb  3.5  /  TBili  0.6  /  DBili  x   /  AST  37<H>  /  ALT  23  /  AlkPhos  83  09-12                            14.9   10.85 )-----------( 333      ( 12 Sep 2022 14:50 )             44.6

## 2022-09-12 NOTE — ED ADULT NURSE REASSESSMENT NOTE - NS ED NURSE REASSESS COMMENT FT1
Assumed care of pt at 19:15 as stated in report from NISHA Quinn Charting as noted. Patient A&O x4, denies pain/discomfort, denies CP/SOB. Updated on the plan of care. Call bell within reach, bed locked in lowest position. IV site flushed w/ NS. No redness, swelling or pain noted to site. No signs of acute distress noted, safety maintained. Pt remains on CM in NSR. .

## 2022-09-12 NOTE — ED ADULT NURSE NOTE - OBJECTIVE STATEMENT
c/o itchiness and redness of skin of the whole body, started yesterday afternoon (after ate a pizza). Reports Pt does not eat egg and seafood but Pt is not sure if Pt has known allergy.

## 2022-09-12 NOTE — H&P ADULT - NSHPPHYSICALEXAM_GEN_ALL_CORE
Vital Signs Last 24 Hrs  T(C): 36.5 (13 Sep 2022 04:14), Max: 36.7 (12 Sep 2022 23:49)  T(F): 97.7 (13 Sep 2022 04:14), Max: 98.1 (13 Sep 2022 01:51)  HR: 55 (13 Sep 2022 04:14) (55 - 84)  BP: 94/56 (13 Sep 2022 04:14) (94/56 - 129/73)  BP(mean): --  RR: 16 (13 Sep 2022 04:14) (16 - 18)  SpO2: 93% (13 Sep 2022 04:14) (93% - 98%)    Parameters below as of 13 Sep 2022 04:14  Patient On (Oxygen Delivery Method): room air    GENERAL:  Well-appearing, not in acute distress  EYES:  Clear conjunctiva, extraocular movement intact  ENT: Moist mucous membranes, no tongue angioedema  RESP:  Non-labored breathing pattern, lungs clear to ausculation, no wheezing  CV: Regular rate and rhythm, no murmurs appreciated, no lower extremity edema  GI: Soft, non-tender, non-distended  NEURO: Awake, alert, conversant, upper and lower extremity strength 5/5, light touch sensation grossly intact  PSYCH: Calm, cooperative  SKIN: Skin erythema and hives on truck and back, warm and dry

## 2022-09-12 NOTE — H&P ADULT - HISTORY OF PRESENT ILLNESS
62yoF hx arterial thrombosis in abdominal aorta on Eliquis, chronic rhinitis, neuropathic pain, GERD, recently admitted to [facility] for sepsis for facial cellulitis discharged on Keflex and prednisone, presenting with diffuse skin redness, generalized body itching, tongue swelling, crampy abdominal pain and dyspnea that began several hours after eating a grandma slice pizza and a beef ricco earlier this afternoon.  Pt denies any allergic reaction after taking the Keflex and states that all of her symptoms started after eating the above foods.  She is not sure if the food contained any special ingredients. She reports hx idiopathic tongue swelling that would occur intermittently over the past several years.  In ED, pt received epinephrine x 2, IV solumedrol and IV Benadryl with resolution of her tongue swelling, abdominal pain and dyspnea, but she is still reporting some itchiness and skin redness in some areas.   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  Pt denies any allergic reaction after taking the Keflex and states that all of her symptoms started after eating the above foods.  She is not sure if the food contained any special ingredients. She reports hx idiopathic tongue swelling that would occur intermittently over the past several years.  In ED, pt received epinephrine x 2, IV solumedrol and IV Benadryl with resolution of her tongue swelling, abdominal pain and dyspnea, but she is still reporting some itchiness and skin redness in some areas.

## 2022-09-12 NOTE — ED PROVIDER NOTE - ATTENDING CONTRIBUTION TO CARE
I, Ashley Doss, performed a history and physical exam of the patient and discussed their management with the resident and /or advanced care provider. I reviewed the resident and /or ACP's note and agree with the documented findings and plan of care except where otherwise noted in my note. I was present and available for all procedures.     61 yo F with pmh LLE DVT on eliquis, recent d/c on 9/3 for SIRS and facial cellulitis started on Keflex p/w diffuse pruritis/rash and reported diffuse swelling including swelling of tongue since eating pizza/pasta carbonara yesterday. no known allergies. no new detergents, deodorants, soaps. also notes diffuse abd pain and 3 episodes of emesis while in ER. no new sob. on exam, patient has nonlabored respirations, lungs CTA b/l no wheezing, no stridor, no oropharyngeal edema, uvula midline, speaking full sentences. abd soft, diffusely tender, scattered blanching erythematous macular rash, mostly to L axilla region. mild edema around eyes (improved since picture patient showed me from earlier today) as well as R eye conjunctival injection. meets criteria for anaphyaxis at this time, states she cannot take prednisone, last took benadryl 6 hours ago. will give epi, pepcid, benadryl. given diffuse abd pain and vomiting, will get ct abd to eval for acute surgical pathology in abdomen as well as lipase to screen for pancreatitis. no evidence of LLE DVT on exam. I, Ashley Doss, performed a history and physical exam of the patient and discussed their management with the resident and /or advanced care provider. I reviewed the resident and /or ACP's note and agree with the documented findings and plan of care except where otherwise noted in my note. I was present and available for all procedures.     61 yo F with pmh LLE DVT on eliquis, recent d/c on 9/3 for SIRS and facial cellulitis started on Keflex p/w diffuse pruritis/rash and reported diffuse swelling including swelling of tongue since eating pizza/pasta carbonara yesterday. no known allergies. no new detergents, deodorants, soaps. also notes diffuse abd pain and 3 episodes of emesis while in ER. no new sob. on exam, patient has nonlabored respirations, lungs CTA b/l no wheezing, no stridor, no oropharyngeal edema, uvula midline, speaking full sentences. abd soft, diffusely tender, scattered blanching erythematous macular rash, mostly to L axilla region. mild edema around eyes (improved since picture patient showed me from earlier today) as well as R eye conjunctival injection. meets criteria for anaphyaxis at this time, states she cannot take prednisone, last took benadryl 6 hours ago. will give epi, pepcid, benadryl. given diffuse abd pain and vomiting, will get ct abd to eval for acute surgical pathology in abdomen as well as lipase to screen for pancreatitis. no evidence of LLE DVT on exam. will get ua to eval for uti/pyelo given L CVAT on resident exam

## 2022-09-12 NOTE — ED ADULT NURSE REASSESSMENT NOTE - NS ED NURSE REASSESS COMMENT FT1
pt complaining of itchy feet and arms. Pt states she felt like this PTA and the benedryl worked to relieve symptoms. Dr. Bryan made aware, 25mg of IVP benedryl being ordered.

## 2022-09-12 NOTE — ED PROVIDER NOTE - PROGRESS NOTE DETAILS
despite emr reading benadryl cancelled, patient did receive benadryl. lactate, lipase and UA added as patient endorsing abd pain and 3 episodes of vomiting. epi given as patient meets criteria for anaphylaxis Ashley Doss MD Attending Physician- patient had recurrance of symptoms, worsening abdominal blanching erythema with pruritis as well as wheezing noted on lung exam, redosed epi, also clarified that prednisone did not make symptoms worse at home (was recently on course of steroids after discharge, no worsening rash or sob) so will dose steroids and loratidine. will require admission at this time. Patrick DUMONT;  Discussed with a hospitalist (Dr Botello) for admission. Skin rash with abdominal pain meet criteria for anaphylaxis. Treated with Epi x2, steroid 40 mg, h1/h2 blocker and claritin. Pending CA abd and UA.

## 2022-09-12 NOTE — ED ADULT TRIAGE NOTE - CHIEF COMPLAINT QUOTE
pt having allergic reaction, whole body swollen, itching  A&Ox3, resp wnl, seeing MD, no one knows why

## 2022-09-13 LAB
ANION GAP SERPL CALC-SCNC: 11 MMOL/L — SIGNIFICANT CHANGE UP (ref 5–17)
BUN SERPL-MCNC: 19 MG/DL — SIGNIFICANT CHANGE UP (ref 8–20)
C4 SERPL-MCNC: 17 MG/DL — SIGNIFICANT CHANGE UP (ref 13–39)
CALCIUM SERPL-MCNC: 8.5 MG/DL — SIGNIFICANT CHANGE UP (ref 8.4–10.5)
CHLORIDE SERPL-SCNC: 103 MMOL/L — SIGNIFICANT CHANGE UP (ref 98–107)
CO2 SERPL-SCNC: 24 MMOL/L — SIGNIFICANT CHANGE UP (ref 22–29)
CREAT SERPL-MCNC: 0.91 MG/DL — SIGNIFICANT CHANGE UP (ref 0.5–1.3)
EGFR: 71 ML/MIN/1.73M2 — SIGNIFICANT CHANGE UP
GLUCOSE SERPL-MCNC: 146 MG/DL — HIGH (ref 70–99)
LACTATE SERPL-SCNC: 1.3 MMOL/L — SIGNIFICANT CHANGE UP (ref 0.5–2)
MAGNESIUM SERPL-MCNC: 1.9 MG/DL — SIGNIFICANT CHANGE UP (ref 1.6–2.6)
POTASSIUM SERPL-MCNC: 4.2 MMOL/L — SIGNIFICANT CHANGE UP (ref 3.5–5.3)
POTASSIUM SERPL-SCNC: 4.2 MMOL/L — SIGNIFICANT CHANGE UP (ref 3.5–5.3)
SARS-COV-2 RNA SPEC QL NAA+PROBE: SIGNIFICANT CHANGE UP
SODIUM SERPL-SCNC: 138 MMOL/L — SIGNIFICANT CHANGE UP (ref 135–145)

## 2022-09-13 PROCEDURE — 99233 SBSQ HOSP IP/OBS HIGH 50: CPT

## 2022-09-13 RX ORDER — ATORVASTATIN CALCIUM 80 MG/1
1 TABLET, FILM COATED ORAL
Qty: 0 | Refills: 0 | DISCHARGE

## 2022-09-13 RX ORDER — APIXABAN 2.5 MG/1
1 TABLET, FILM COATED ORAL
Qty: 0 | Refills: 0 | DISCHARGE

## 2022-09-13 RX ADMIN — Medication 25 MILLIGRAM(S): at 01:30

## 2022-09-13 RX ADMIN — Medication 25 MILLIGRAM(S): at 17:11

## 2022-09-13 RX ADMIN — Medication 25 MILLIGRAM(S): at 21:40

## 2022-09-13 RX ADMIN — Medication 25 MILLIGRAM(S): at 08:26

## 2022-09-13 RX ADMIN — Medication 1.5 MILLIGRAM(S): at 01:29

## 2022-09-13 RX ADMIN — Medication 60 MILLIGRAM(S): at 05:58

## 2022-09-13 RX ADMIN — Medication 650 MILLIGRAM(S): at 17:15

## 2022-09-13 RX ADMIN — APIXABAN 5 MILLIGRAM(S): 2.5 TABLET, FILM COATED ORAL at 17:11

## 2022-09-13 RX ADMIN — PANTOPRAZOLE SODIUM 40 MILLIGRAM(S): 20 TABLET, DELAYED RELEASE ORAL at 05:58

## 2022-09-13 RX ADMIN — ATORVASTATIN CALCIUM 40 MILLIGRAM(S): 80 TABLET, FILM COATED ORAL at 21:40

## 2022-09-13 RX ADMIN — LORATADINE 10 MILLIGRAM(S): 10 TABLET ORAL at 08:26

## 2022-09-13 RX ADMIN — Medication 650 MILLIGRAM(S): at 17:34

## 2022-09-13 RX ADMIN — APIXABAN 5 MILLIGRAM(S): 2.5 TABLET, FILM COATED ORAL at 05:59

## 2022-09-13 RX ADMIN — Medication 81 MILLIGRAM(S): at 08:26

## 2022-09-13 NOTE — PATIENT PROFILE ADULT - FUNCTIONAL ASSESSMENT - BASIC MOBILITY 6.
3-calculated by average/Not able to assess (calculate score using [facility] averaging method)

## 2022-09-13 NOTE — PATIENT PROFILE ADULT - FALL HARM RISK - RISK INTERVENTIONS

## 2022-09-13 NOTE — PROGRESS NOTE ADULT - SUBJECTIVE AND OBJECTIVE BOX
INTERVAL HPI/OVERNIGHT EVENTS:    CC:    Vital Signs Last 24 Hrs  T(C): 36.5 (13 Sep 2022 04:14), Max: 36.7 (12 Sep 2022 23:49)  T(F): 97.7 (13 Sep 2022 04:14), Max: 98.1 (13 Sep 2022 01:51)  HR: 55 (13 Sep 2022 04:14) (55 - 84)  BP: 94/56 (13 Sep 2022 04:14) (94/56 - 129/73)  BP(mean): --  RR: 16 (13 Sep 2022 04:14) (16 - 18)  SpO2: 93% (13 Sep 2022 04:14) (93% - 98%)    Parameters below as of 13 Sep 2022 04:14  Patient On (Oxygen Delivery Method): room air        PHYSICAL EXAM:    GENERAL:   CHEST/LUNG:   HEART:   ABDOMEN:   EXTREMITIES:      MEDICATIONS  (STANDING):  apixaban 5 milliGRAM(s) Oral every 12 hours  aspirin enteric coated 81 milliGRAM(s) Oral daily  atorvastatin 40 milliGRAM(s) Oral at bedtime  loratadine 10 milliGRAM(s) Oral daily  methylPREDNISolone sodium succinate Injectable 60 milliGRAM(s) IV Push daily  pantoprazole    Tablet 40 milliGRAM(s) Oral before breakfast    MEDICATIONS  (PRN):  acetaminophen     Tablet .. 650 milliGRAM(s) Oral every 6 hours PRN Temp greater or equal to 38C (100.4F), Mild Pain (1 - 3), Moderate Pain (4 - 6)  ALPRAZolam 1.5 milliGRAM(s) Oral at bedtime PRN Insomnia  diphenhydrAMINE Injectable 25 milliGRAM(s) IV Push every 4 hours PRN Rash and/or Itching  gabapentin 300 milliGRAM(s) Oral daily PRN Neuropathic pain  ondansetron Injectable 4 milliGRAM(s) IV Push every 6 hours PRN Nausea and/or Vomiting      Allergies    No Known Allergies    Intolerances          LABS:                          14.9   10.85 )-----------( 333      ( 12 Sep 2022 14:50 )             44.6     09-13    138  |  103  |  19.0  ----------------------------<  146<H>  4.2   |  24.0  |  0.91    Ca    8.5      13 Sep 2022 06:44  Mg     1.9         TPro  6.6  /  Alb  3.5  /  TBili  0.6  /  DBili  x   /  AST  37<H>  /  ALT  23  /  AlkPhos  83        Urinalysis Basic - ( 12 Sep 2022 20:00 )    Color: Yellow / Appearance: Clear / S.020 / pH: x  Gluc: x / Ketone: Trace  / Bili: Negative / Urobili: Negative mg/dL   Blood: x / Protein: 15 / Nitrite: Negative   Leuk Esterase: Negative / RBC: 0-2 /HPF / WBC Negative /HPF   Sq Epi: x / Non Sq Epi: Occasional / Bacteria: x        RADIOLOGY & ADDITIONAL TESTS:   INTERVAL HPI/OVERNIGHT EVENTS:    CC: anaphylaxis, arterial thrombosis in aorta, DVT, hyperlipidemia      Chart and course reviewed.  Patient states rash is mildly improved  tongue swelling better  reports symptoms started after eating Serbian beef ricco and pizza  reports multiple complaints, pain, DVTs, multiple appointments with various providers for the same.     Vital Signs Last 24 Hrs  T(C): 36.5 (13 Sep 2022 04:14), Max: 36.7 (12 Sep 2022 23:49)  T(F): 97.7 (13 Sep 2022 04:14), Max: 98.1 (13 Sep 2022 01:51)  HR: 55 (13 Sep 2022 04:14) (55 - 84)  BP: 94/56 (13 Sep 2022 04:14) (94/56 - 129/73)  BP(mean): --  RR: 16 (13 Sep 2022 04:14) (16 - 18)  SpO2: 93% (13 Sep 2022 04:14) (93% - 98%)    Parameters below as of 13 Sep 2022 04:14  Patient On (Oxygen Delivery Method): room air        PHYSICAL EXAM:    GENERAL: alert, not in distress, diffuse rash over face, torso and back  CHEST/LUNG: b/l air entry  HEART: reg  ABDOMEN: soft, non tender, BS+  EXTREMITIES:  no edema, tenderness    MEDICATIONS  (STANDING):  apixaban 5 milliGRAM(s) Oral every 12 hours  aspirin enteric coated 81 milliGRAM(s) Oral daily  atorvastatin 40 milliGRAM(s) Oral at bedtime  loratadine 10 milliGRAM(s) Oral daily  methylPREDNISolone sodium succinate Injectable 60 milliGRAM(s) IV Push daily  pantoprazole    Tablet 40 milliGRAM(s) Oral before breakfast    MEDICATIONS  (PRN):  acetaminophen     Tablet .. 650 milliGRAM(s) Oral every 6 hours PRN Temp greater or equal to 38C (100.4F), Mild Pain (1 - 3), Moderate Pain (4 - 6)  ALPRAZolam 1.5 milliGRAM(s) Oral at bedtime PRN Insomnia  diphenhydrAMINE Injectable 25 milliGRAM(s) IV Push every 4 hours PRN Rash and/or Itching  gabapentin 300 milliGRAM(s) Oral daily PRN Neuropathic pain  ondansetron Injectable 4 milliGRAM(s) IV Push every 6 hours PRN Nausea and/or Vomiting      Allergies    No Known Allergies    Intolerances          LABS:                          14.9   10.85 )-----------( 333      ( 12 Sep 2022 14:50 )             44.6         138  |  103  |  19.0  ----------------------------<  146<H>  4.2   |  24.0  |  0.91    Ca    8.5      13 Sep 2022 06:44  Mg     1.9         TPro  6.6  /  Alb  3.5  /  TBili  0.6  /  DBili  x   /  AST  37<H>  /  ALT  23  /  AlkPhos  83        Urinalysis Basic - ( 12 Sep 2022 20:00 )    Color: Yellow / Appearance: Clear / S.020 / pH: x  Gluc: x / Ketone: Trace  / Bili: Negative / Urobili: Negative mg/dL   Blood: x / Protein: 15 / Nitrite: Negative   Leuk Esterase: Negative / RBC: 0-2 /HPF / WBC Negative /HPF   Sq Epi: x / Non Sq Epi: Occasional / Bacteria: x        RADIOLOGY & ADDITIONAL TESTS:

## 2022-09-13 NOTE — PROGRESS NOTE ADULT - ASSESSMENT
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 She received Epinephrine, steroids in ED. CTA abdomen was done to evaluate abdominal pain, revealed enterocolitis.  Distal esophageal wall thickening, suggestive of esophagitis. 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 She received Epinephrine, steroids in ED. CTA abdomen was done to evaluate abdominal pain, revealed enterocolitis.  Distal esophageal wall thickening, suggestive of esophagitis.    1. Anaphylaxis:  unclear cause  improving  complement levels sent  continue IV steroids  will need allergy testing as outpatient.    2. Hyperlipidemia:  Continue statin.    3. Neuropathic pain:  Continue Gabapentin.    4. Arterial thrombosis/DVT:  On Eliquis    5. Esophagitis/enterocolitis:  As noted on CT  defer antibiotics for now  no diarrhea  trial of PPI  on regular diet    6. DVT ppx:  Eliquis    Discussed with patient.  Anticipate discharge in 24 hrs.

## 2022-09-14 ENCOUNTER — TRANSCRIPTION ENCOUNTER (OUTPATIENT)
Age: 62
End: 2022-09-14

## 2022-09-14 VITALS
DIASTOLIC BLOOD PRESSURE: 65 MMHG | HEART RATE: 60 BPM | SYSTOLIC BLOOD PRESSURE: 105 MMHG | OXYGEN SATURATION: 98 % | TEMPERATURE: 98 F

## 2022-09-14 PROCEDURE — 83605 ASSAY OF LACTIC ACID: CPT

## 2022-09-14 PROCEDURE — 74177 CT ABD & PELVIS W/CONTRAST: CPT | Mod: MA

## 2022-09-14 PROCEDURE — U0003: CPT

## 2022-09-14 PROCEDURE — 96361 HYDRATE IV INFUSION ADD-ON: CPT

## 2022-09-14 PROCEDURE — 99285 EMERGENCY DEPT VISIT HI MDM: CPT | Mod: 25

## 2022-09-14 PROCEDURE — 86160 COMPLEMENT ANTIGEN: CPT

## 2022-09-14 PROCEDURE — 85025 COMPLETE CBC W/AUTO DIFF WBC: CPT

## 2022-09-14 PROCEDURE — 80048 BASIC METABOLIC PNL TOTAL CA: CPT

## 2022-09-14 PROCEDURE — 83735 ASSAY OF MAGNESIUM: CPT

## 2022-09-14 PROCEDURE — 96374 THER/PROPH/DIAG INJ IV PUSH: CPT

## 2022-09-14 PROCEDURE — U0005: CPT

## 2022-09-14 PROCEDURE — 86161 COMPLEMENT/FUNCTION ACTIVITY: CPT

## 2022-09-14 PROCEDURE — 99239 HOSP IP/OBS DSCHRG MGMT >30: CPT

## 2022-09-14 PROCEDURE — 96372 THER/PROPH/DIAG INJ SC/IM: CPT

## 2022-09-14 PROCEDURE — 80053 COMPREHEN METABOLIC PANEL: CPT

## 2022-09-14 PROCEDURE — 85027 COMPLETE CBC AUTOMATED: CPT

## 2022-09-14 PROCEDURE — 83690 ASSAY OF LIPASE: CPT

## 2022-09-14 PROCEDURE — 81001 URINALYSIS AUTO W/SCOPE: CPT

## 2022-09-14 PROCEDURE — 93005 ELECTROCARDIOGRAM TRACING: CPT

## 2022-09-14 PROCEDURE — 96375 TX/PRO/DX INJ NEW DRUG ADDON: CPT

## 2022-09-14 PROCEDURE — 36415 COLL VENOUS BLD VENIPUNCTURE: CPT

## 2022-09-14 RX ORDER — FAMOTIDINE 10 MG/ML
1 INJECTION INTRAVENOUS
Qty: 60 | Refills: 0
Start: 2022-09-14 | End: 2022-10-13

## 2022-09-14 RX ORDER — EPINEPHRINE 0.3 MG/.3ML
0.3 INJECTION INTRAMUSCULAR; SUBCUTANEOUS
Qty: 2 | Refills: 0
Start: 2022-09-14

## 2022-09-14 RX ORDER — LEVOCETIRIZINE DIHYDROCHLORIDE 0.5 MG/ML
1 SOLUTION ORAL
Qty: 0 | Refills: 0 | DISCHARGE

## 2022-09-14 RX ORDER — RABEPRAZOLE 20 MG/1
0 TABLET, DELAYED RELEASE ORAL
Qty: 0 | Refills: 0 | DISCHARGE

## 2022-09-14 RX ORDER — PANTOPRAZOLE SODIUM 20 MG/1
1 TABLET, DELAYED RELEASE ORAL
Qty: 30 | Refills: 0
Start: 2022-09-14 | End: 2022-10-13

## 2022-09-14 RX ADMIN — Medication 60 MILLIGRAM(S): at 05:06

## 2022-09-14 RX ADMIN — Medication 1.5 MILLIGRAM(S): at 00:03

## 2022-09-14 RX ADMIN — PANTOPRAZOLE SODIUM 40 MILLIGRAM(S): 20 TABLET, DELAYED RELEASE ORAL at 05:05

## 2022-09-14 RX ADMIN — LORATADINE 10 MILLIGRAM(S): 10 TABLET ORAL at 09:31

## 2022-09-14 RX ADMIN — Medication 25 MILLIGRAM(S): at 09:25

## 2022-09-14 RX ADMIN — APIXABAN 5 MILLIGRAM(S): 2.5 TABLET, FILM COATED ORAL at 05:06

## 2022-09-14 RX ADMIN — Medication 81 MILLIGRAM(S): at 09:31

## 2022-09-14 NOTE — DISCHARGE NOTE NURSING/CASE MANAGEMENT/SOCIAL WORK - NSDCPEFALRISK_GEN_ALL_CORE
For information on Fall & Injury Prevention, visit: [email or web address] OR  [email or web address] OR  https://www.cdc.gov/steadi/patient.html

## 2022-09-14 NOTE — DISCHARGE NOTE PROVIDER - HOSPITAL COURSE
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 She received Epinephrine, steroids in ED. CTA abdomen was done to evaluate abdominal pain, revealed enterocolitis.  Distal esophageal wall thickening, suggestive of esophagitis. 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 She received Epinephrine, steroids in ED. CTA abdomen was done to evaluate abdominal pain, revealed enterocolitis.  Distal esophageal wall thickening, suggestive of esophagitis. She improved clinically and is stable for discharge home.

## 2022-09-14 NOTE — PROGRESS NOTE ADULT - ASSESSMENT
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 She received Epinephrine, steroids in ED. CTA abdomen was done to evaluate abdominal pain, revealed enterocolitis.  Distal esophageal wall thickening, suggestive of esophagitis.    1. Anaphylaxis:  unclear cause  improving  complement levels sent  outpatient allergy testing  medrol dose pack  ppi for GI ppx.    2. Hyperlipidemia:  Continue statin.    3. Neuropathic pain:  Continue Gabapentin.    4. Arterial thrombosis/DVT:  On Eliquis    5. Esophagitis/enterocolitis:  As noted on CT  defer antibiotics for now  no diarrhea  trial of PPI  on regular diet    6. DVT ppx:  Eliquis    Discussed with patient.  discharge home today.

## 2022-09-14 NOTE — DISCHARGE NOTE NURSING/CASE MANAGEMENT/SOCIAL WORK - PATIENT PORTAL LINK FT
You can access the FollowMyHealth Patient Portal offered by [facility] by registering at the following website: [email or web address]. By joining [employer]’s FollowMyHealth portal, you will also be able to view your health information using other applications (apps) compatible with our system.

## 2022-09-14 NOTE — DISCHARGE NOTE PROVIDER - NSDCFUSCHEDAPPT_GEN_ALL_CORE_FT
Gus Ji  [facility]  PULMMED [address]  Scheduled Appointment: 10/05/2022    Elbert Guillen  [facility]  INTMED [address]  Scheduled Appointment: 10/18/2022

## 2022-09-14 NOTE — PROGRESS NOTE ADULT - REASON FOR ADMISSION
[facility], [place]    Internal Medicine Progress Note - Gold Service      Assessment & Plan   Alexandra Melgoza is a 22 year old female admitted on 12/6/2017. She has a history of asthma, chronic abdominal pain s/p cholecystectomy, cyclic N/V, migraines, pseudoseizures and somatoform disorder and is admitted for abdominal pain and fever.  She was transferred here from a facility in [place] where she has been hospitalized for 5 days.      Changes today:  -was better , EGD with erosive gastritis  -started on clear liquid diet, continued with oxycodone, hydromorphone (decreased frequency today)  -zofran and bendryl for nausea     1)Concern for upper GI bleed:  Patient had an episode of hematemesis -concern for erosive gastritis in setting of NSAID use   -hemodynamically stable and hemoglobin stable  -GI consulted -EGD with erosive gastritis and continue with PPI BID  -clear liquid diet  -NJ tube was d/francy      2) Recurrent abdominal pain   Acute on chronic pancreatitis   - Presents with 7 days now of epigastric abdominal pain radiating to her back as well as nausea and vomiting.  CT scan at outside facility noted some minimal pancreatic inflammation, lipase negative, LFTs normal.  Reports a diagnosis of chronic pancreatitis but per our last GI note does not meet diagnostic criteria (Oracio, 11/22/2017).  Had an MRCP planned here and transferred to see Dr Narayanan in the hospital if possible.  MRCP with secretin with normal response -with no changes in pancreas  CT abdomen stat obtained due to concern for worsening abdominal pain in setting of hematemesis  Pain consulted-appreciate recs  -continue with hydromorphone IV PRN (decreased frequency to every 4 hrs), oxycodone solution PRN and tylenol veena  -d/c celecoxib due to concern for hematemesis  -continue with gabapentin, nortryptilline and pancreatic enzymes  -nausea-treating with zofran and benadryl      3) History of endometriosis  - 
Anaphylaxis, recurrent idiopathic tongue angioedema
Continue lupron     4) History of asthma  - Continue levalbuterol      Diet: clear liquid diet   Fluids: ringer's lactate   DVT Prophylaxis: ambulate  Code Status: Full Code    Disposition Plan   Expected discharge: 2 - 3 days, recommended to prior living arrangement once adequate pain management/ tolerating PO medications.     Entered: Chip Blanc 12/09/2017, 4:02 PM   Information in the above section will display in the discharge planner report.      The patient's care was discussed with the Bedside Nurse.    Chip Blanc  Internal Medicine Staff Hospitalist Service  [facility]  Pager: [phone]  Please see sticky note for cross cover information    Interval History   Pt still complaining of epigastric pain; feels better after discontinuing the NJ tube    Last 24 hr care team notes reviewed.   ROS:  4 point ROS including Respiratory, CV, GI and , other than that noted in the HPI, is negative.       Data reviewed today: I reviewed all medications, new labs and imaging results over the last 24 hours. I personally reviewed no images or EKG's today.    Physical Exam   Vital Signs: Temp: 96.5  F (35.8  C) Temp src: Oral BP: 123/81   Heart Rate: 59 Resp: 16 SpO2: 100 % O2 Device: None (Room air)    Weight: 152 lbs 0 oz  General Appearance: Pt in  distress due to pain  Respiratory: b/l equal breath sounds with no added sounds   Cardiovascular: s1s2 with no murmur   GI:  tenderness in epigastric, bowel sounds noted  Skin: no rash  Neuro: AAOx3, b/l UE and LE-5/5          Data   Data     Recent Labs  Lab 12/09/17  0708 12/08/17  1716 12/08/17  0713  12/06/17  2132   WBC 3.6* 3.6* 3.9*  --  7.9   HGB 10.7* 11.4* 10.7*  --  10.6*   MCV 88 89 88  --  88    170 160  --  147*    140 138  < > 138   POTASSIUM 3.7 3.9 3.6  < > 3.3*   CHLORIDE 104 104 103  < > 102   CO2 27 27 27  < > 27   BUN 5* 6* 7  < > 3*   CR 0.50* 0.58 0.57  < > 0.65   ANIONGAP 8 8 8  < > 8   RENEA 8.4* 8.6 8.8  < > 8.5   GLC 
75 96 99  < > 79   ALBUMIN 3.3* 3.4 3.2*  --  3.3*   PROTTOTAL 6.3* 6.5* 6.0*  --  5.5*   BILITOTAL 0.6 0.6 1.4*  --  0.8   ALKPHOS 75 78 74  --  73   ALT 17 18 18  --  18   AST 11 15 14  --  16   LIPASE  --  88  --   --  96   < > = values in this interval not displayed.    
Anaphylaxis, recurrent idiopathic tongue angioedema

## 2022-09-14 NOTE — PROGRESS NOTE ADULT - SUBJECTIVE AND OBJECTIVE BOX
INTERVAL HPI/OVERNIGHT EVENTS:    CC: anaphylaxis, arterial thrombosis in aorta, DVT, hyperlipidemia    No overnight events  rash improved  no shortness of breath  tolerating diet      Vital Signs Last 24 Hrs  T(C): 36.7 (14 Sep 2022 10:36), Max: 36.9 (13 Sep 2022 20:04)  T(F): 98 (14 Sep 2022 10:36), Max: 98.4 (13 Sep 2022 20:04)  HR: 60 (14 Sep 2022 10:36) (55 - 69)  BP: 105/65 (14 Sep 2022 10:36) (105/65 - 127/75)  BP(mean): --  RR: 18 (14 Sep 2022 04:20) (18 - 18)  SpO2: 98% (14 Sep 2022 10:36) (97% - 99%)    Parameters below as of 14 Sep 2022 10:36  Patient On (Oxygen Delivery Method): room air        PHYSICAL EXAM:    GENERAL: alert, not in distress  CHEST/LUNG: b/l air entry  HEART: reg  ABDOMEN: soft, bs+, non tender  EXTREMITIES:  no edema, tenderness  skin: rash significantly improved.     MEDICATIONS  (STANDING):  apixaban 5 milliGRAM(s) Oral every 12 hours  aspirin enteric coated 81 milliGRAM(s) Oral daily  atorvastatin 40 milliGRAM(s) Oral at bedtime  loratadine 10 milliGRAM(s) Oral daily  pantoprazole    Tablet 40 milliGRAM(s) Oral before breakfast    MEDICATIONS  (PRN):  acetaminophen     Tablet .. 650 milliGRAM(s) Oral every 6 hours PRN Temp greater or equal to 38C (100.4F), Mild Pain (1 - 3), Moderate Pain (4 - 6)  ALPRAZolam 1.5 milliGRAM(s) Oral at bedtime PRN Insomnia  diphenhydrAMINE Injectable 25 milliGRAM(s) IV Push every 4 hours PRN Rash and/or Itching  gabapentin 300 milliGRAM(s) Oral daily PRN Neuropathic pain  ondansetron Injectable 4 milliGRAM(s) IV Push every 6 hours PRN Nausea and/or Vomiting      Allergies    No Known Allergies    Intolerances          LABS:                          14.9   10.85 )-----------( 333      ( 12 Sep 2022 14:50 )             44.6     09-13    138  |  103  |  19.0  ----------------------------<  146<H>  4.2   |  24.0  |  0.91    Ca    8.5      13 Sep 2022 06:44  Mg     1.9             Urinalysis Basic - ( 12 Sep 2022 20:00 )    Color: Yellow / Appearance: Clear / S.020 / pH: x  Gluc: x / Ketone: Trace  / Bili: Negative / Urobili: Negative mg/dL   Blood: x / Protein: 15 / Nitrite: Negative   Leuk Esterase: Negative / RBC: 0-2 /HPF / WBC Negative /HPF   Sq Epi: x / Non Sq Epi: Occasional / Bacteria: x        RADIOLOGY & ADDITIONAL TESTS:

## 2022-09-14 NOTE — PATIENT PROFILE ADULT - NSPROPTRIGHTCAREGIVER_GEN_A_NUR
Multilayer Compression Wrap   Below the Knee    NAME:  Taya Starkey  YOB: 1962  MEDICAL RECORD NUMBER:  [id number]  DATE:  9/14/2022    Multilayer compression wrap: Removed old Multilayer wrap if indicated and wash leg with mild soap/water. Applied moisturizing agent to dry skin as needed. Applied primary and secondary dressing as ordered. Applied multilayered dressing below the knee to right lower leg. Applied multilayered dressing below the knee to left lower leg. Instructed patient/caregiver not to remove dressing and to keep it clean and dry. Instructed patient/caregiver on complications to report to provider, such as pain, numbness in toes, heavy drainage, and slippage of dressing. Instructed patient on purpose of compression dressing and on activity and exercise recommendations.      Applied  2 layer wrap from toes to knee overlapping each time    Electronically signed by Jemma Nugent LPN on 2/84/5771 at 5:90 PM no

## 2022-09-14 NOTE — DISCHARGE NOTE PROVIDER - NSDCCPCAREPLAN_GEN_ALL_CORE_FT
PRINCIPAL DISCHARGE DIAGNOSIS  Diagnosis: Anaphylaxis  Assessment and Plan of Treatment: resolving  outpatient follow up with immunology      SECONDARY DISCHARGE DIAGNOSES  Diagnosis: H/O esophagitis  Assessment and Plan of Treatment: continue Pantoprazole

## 2022-09-14 NOTE — DISCHARGE NOTE PROVIDER - NSDCMRMEDTOKEN_GEN_ALL_CORE_FT
ALPRAZolam 0.5 mg oral tablet: 1.5 tab(s) orally once a day (at bedtime), As Needed  aspirin 81 mg oral tablet: orally once a day  atorvastatin 40 mg oral tablet: 1 tab(s) orally once a day  Eliquis 5 mg oral tablet: 1 tab(s) orally 2 times a day  gabapentin 300 mg oral tablet: orally once a day, As Needed  levocetirizine 5 mg oral tablet: 1 tab(s) orally once a day, As Needed  meprobamate 400 mg oral tablet: 1 tab(s) orally 3 times a day, As Needed  RABEprazole 20 mg oral tablet, extended release: orally once a day

## 2022-09-14 NOTE — DISCHARGE NOTE PROVIDER - ATTENDING DISCHARGE PHYSICAL EXAMINATION:
alert, not in distress  lungs: b/l air entry  cvs: regular  abdo: soft, bs+  ext: no edema, tenderness  rash improved.

## 2022-09-16 LAB
C1INH FUNCTIONAL FLD-MCNC: >100 — SIGNIFICANT CHANGE UP
C1INH FUNCTIONAL/C1INH TOTAL MFR SERPL: 30 MG/DL — SIGNIFICANT CHANGE UP (ref 21–39)

## 2022-09-20 ENCOUNTER — NON-APPOINTMENT (OUTPATIENT)
Age: 62
End: 2022-09-20

## 2022-09-20 LAB — C1Q AG SERPL RAJI CELL-ACNC: 9.6 MG/DL — LOW (ref 10.3–20.5)

## 2022-09-21 ENCOUNTER — EMERGENCY (EMERGENCY)
Facility: HOSPITAL | Age: 62
LOS: 1 days | Discharge: HOME | End: 2022-09-21
Attending: EMERGENCY MEDICINE
Payer: COMMERCIAL

## 2022-09-21 VITALS
SYSTOLIC BLOOD PRESSURE: 132 MMHG | RESPIRATION RATE: 20 BRPM | HEIGHT: 64 IN | OXYGEN SATURATION: 98 % | TEMPERATURE: 98 F | HEART RATE: 85 BPM | DIASTOLIC BLOOD PRESSURE: 69 MMHG | WEIGHT: 145.06 LBS

## 2022-09-21 VITALS
OXYGEN SATURATION: 99 % | DIASTOLIC BLOOD PRESSURE: 75 MMHG | HEART RATE: 68 BPM | TEMPERATURE: 99 F | RESPIRATION RATE: 16 BRPM | SYSTOLIC BLOOD PRESSURE: 150 MMHG

## 2022-09-21 DIAGNOSIS — Z98.890 OTHER SPECIFIED POSTPROCEDURAL STATES: Chronic | ICD-10-CM

## 2022-09-21 DIAGNOSIS — Z87.81 PERSONAL HISTORY OF (HEALED) TRAUMATIC FRACTURE: Chronic | ICD-10-CM

## 2022-09-21 LAB
ALBUMIN SERPL ELPH-MCNC: 4.1 G/DL — SIGNIFICANT CHANGE UP (ref 3.3–5.2)
ALP SERPL-CCNC: 90 U/L — SIGNIFICANT CHANGE UP (ref 40–120)
ALT FLD-CCNC: 20 U/L — SIGNIFICANT CHANGE UP
ANION GAP SERPL CALC-SCNC: 8 MMOL/L — SIGNIFICANT CHANGE UP (ref 5–17)
AST SERPL-CCNC: 17 U/L — SIGNIFICANT CHANGE UP
BASOPHILS # BLD AUTO: 0.04 K/UL — SIGNIFICANT CHANGE UP (ref 0–0.2)
BASOPHILS NFR BLD AUTO: 0.6 % — SIGNIFICANT CHANGE UP (ref 0–2)
BILIRUB SERPL-MCNC: <0.2 MG/DL — LOW (ref 0.4–2)
BUN SERPL-MCNC: 23.8 MG/DL — HIGH (ref 8–20)
CALCIUM SERPL-MCNC: 9.6 MG/DL — SIGNIFICANT CHANGE UP (ref 8.4–10.5)
CHLORIDE SERPL-SCNC: 105 MMOL/L — SIGNIFICANT CHANGE UP (ref 98–107)
CO2 SERPL-SCNC: 30 MMOL/L — HIGH (ref 22–29)
CREAT SERPL-MCNC: 1.03 MG/DL — SIGNIFICANT CHANGE UP (ref 0.5–1.3)
EGFR: 61 ML/MIN/1.73M2 — SIGNIFICANT CHANGE UP
EOSINOPHIL # BLD AUTO: 0.68 K/UL — HIGH (ref 0–0.5)
EOSINOPHIL NFR BLD AUTO: 9.7 % — HIGH (ref 0–6)
GLUCOSE SERPL-MCNC: 137 MG/DL — HIGH (ref 70–99)
HCT VFR BLD CALC: 42.4 % — SIGNIFICANT CHANGE UP (ref 34.5–45)
HGB BLD-MCNC: 13.9 G/DL — SIGNIFICANT CHANGE UP (ref 11.5–15.5)
IMM GRANULOCYTES NFR BLD AUTO: 0.1 % — SIGNIFICANT CHANGE UP (ref 0–0.9)
LIDOCAIN IGE QN: 16 U/L — LOW (ref 22–51)
LYMPHOCYTES # BLD AUTO: 1.98 K/UL — SIGNIFICANT CHANGE UP (ref 1–3.3)
LYMPHOCYTES # BLD AUTO: 28.2 % — SIGNIFICANT CHANGE UP (ref 13–44)
MCHC RBC-ENTMCNC: 31.1 PG — SIGNIFICANT CHANGE UP (ref 27–34)
MCHC RBC-ENTMCNC: 32.8 GM/DL — SIGNIFICANT CHANGE UP (ref 32–36)
MCV RBC AUTO: 94.9 FL — SIGNIFICANT CHANGE UP (ref 80–100)
MONOCYTES # BLD AUTO: 0.52 K/UL — SIGNIFICANT CHANGE UP (ref 0–0.9)
MONOCYTES NFR BLD AUTO: 7.4 % — SIGNIFICANT CHANGE UP (ref 2–14)
NEUTROPHILS # BLD AUTO: 3.79 K/UL — SIGNIFICANT CHANGE UP (ref 1.8–7.4)
NEUTROPHILS NFR BLD AUTO: 54 % — SIGNIFICANT CHANGE UP (ref 43–77)
PLATELET # BLD AUTO: 284 K/UL — SIGNIFICANT CHANGE UP (ref 150–400)
POTASSIUM SERPL-MCNC: 4.1 MMOL/L — SIGNIFICANT CHANGE UP (ref 3.5–5.3)
POTASSIUM SERPL-SCNC: 4.1 MMOL/L — SIGNIFICANT CHANGE UP (ref 3.5–5.3)
PROT SERPL-MCNC: 6.9 G/DL — SIGNIFICANT CHANGE UP (ref 6.6–8.7)
RBC # BLD: 4.47 M/UL — SIGNIFICANT CHANGE UP (ref 3.8–5.2)
RBC # FLD: 14.5 % — SIGNIFICANT CHANGE UP (ref 10.3–14.5)
SODIUM SERPL-SCNC: 143 MMOL/L — SIGNIFICANT CHANGE UP (ref 135–145)
WBC # BLD: 7.02 K/UL — SIGNIFICANT CHANGE UP (ref 3.8–10.5)
WBC # FLD AUTO: 7.02 K/UL — SIGNIFICANT CHANGE UP (ref 3.8–10.5)

## 2022-09-21 PROCEDURE — 80053 COMPREHEN METABOLIC PANEL: CPT

## 2022-09-21 PROCEDURE — 99284 EMERGENCY DEPT VISIT MOD MDM: CPT | Mod: 25

## 2022-09-21 PROCEDURE — 83690 ASSAY OF LIPASE: CPT

## 2022-09-21 PROCEDURE — 85025 COMPLETE CBC W/AUTO DIFF WBC: CPT

## 2022-09-21 PROCEDURE — 99285 EMERGENCY DEPT VISIT HI MDM: CPT

## 2022-09-21 PROCEDURE — 74177 CT ABD & PELVIS W/CONTRAST: CPT | Mod: 26,MA

## 2022-09-21 PROCEDURE — 74177 CT ABD & PELVIS W/CONTRAST: CPT | Mod: MA

## 2022-09-21 PROCEDURE — 96360 HYDRATION IV INFUSION INIT: CPT | Mod: XU

## 2022-09-21 PROCEDURE — 36415 COLL VENOUS BLD VENIPUNCTURE: CPT

## 2022-09-21 RX ORDER — MULTIVIT WITH MIN/MFOLATE/K2 340-15/3 G
1 POWDER (GRAM) ORAL ONCE
Refills: 0 | Status: DISCONTINUED | OUTPATIENT
Start: 2022-09-21 | End: 2022-09-21

## 2022-09-21 RX ORDER — SODIUM CHLORIDE 9 MG/ML
1000 INJECTION, SOLUTION INTRAVENOUS ONCE
Refills: 0 | Status: COMPLETED | OUTPATIENT
Start: 2022-09-21 | End: 2022-09-21

## 2022-09-21 RX ORDER — MAGNESIUM HYDROXIDE 400 MG/1
30 TABLET, CHEWABLE ORAL DAILY
Refills: 0 | Status: DISCONTINUED | OUTPATIENT
Start: 2022-09-21 | End: 2022-09-25

## 2022-09-21 RX ADMIN — SODIUM CHLORIDE 1000 MILLILITER(S): 9 INJECTION, SOLUTION INTRAVENOUS at 15:37

## 2022-09-21 RX ADMIN — MAGNESIUM HYDROXIDE 30 MILLILITER(S): 400 TABLET, CHEWABLE ORAL at 20:08

## 2022-09-21 NOTE — ED ADULT NURSE NOTE - OBJECTIVE STATEMENT
Pt presents to ED for abd bloating x 2 weeks.    Pt states she upset that her GI doctor has not helped and sent her to be seen. " I want answers this is ridiculous"

## 2022-09-21 NOTE — ED ADULT TRIAGE NOTE - CHIEF COMPLAINT QUOTE
Pt arrives to ED c/o constipation for three days and stomach " bloating " Pt tried OT medication without any relief

## 2022-09-21 NOTE — ED PROVIDER NOTE - PHYSICAL EXAMINATION
Alert, lucid, and in no apparent distress. Pt is normocephalic, atraumatic.  Pupils are equal, round, lips pink, moist mucous membranes, tongue midline. Neck supple.   Lungs clear to auscultation. Heart regular rate and rhythm, normal S1, S2, no murmurs, gallops, rubs.  Abdomen is soft, nontender, no pulsatile mass, no masses, no distension, no rebound. No CVA Tenderness, no suprapubic tenderness.   Non-focal sensory, 5 out of 5 motor strength, no dysmetria, fluent, goal directed speech. CN2 to 12 intact. Skin without rash, purpura, eccyhmosis  No submandibular adenopathy. Normal mentation, does not appear agitated

## 2022-09-21 NOTE — ED PROVIDER NOTE - CARE PROVIDER_API CALL
Myrna Romero (DO)  Gastroenterology  [address], [address]  [place] [address]  Phone: [phone]  Fax: [phone]  Follow Up Time:

## 2022-09-21 NOTE — ED PROVIDER NOTE - PATIENT PORTAL LINK FT
You can access the FollowMyHealth Patient Portal offered by [facility] by registering at the following website: [email or web address]. By joining [employer]’s FollowMyHealth portal, you will also be able to view your health information using other applications (apps) compatible with our system.

## 2022-09-21 NOTE — ED STATDOCS - PROGRESS NOTE DETAILS
61yo female with PMH anxiety, arterial thrombosis on Eliquis, hyperlipidemia, recent admission for enteritis with discharge home 6 days ago presenting with worsening abdominal pain, bloating, and inability to have bowel movement. "There is something wrong with me." Patient moved to main ED for evaluation by another provider. Eliane Guadalupe DO

## 2022-09-21 NOTE — ED PROVIDER NOTE - OBJECTIVE STATEMENT
61 yo 61 yo  female pmh abdominal aortic thrombus on eliquis comes to e with  lower abdominal pain over the last 4 days; pt noted without a bowel movement x 4 days; pt tolerating po in ed;

## 2022-10-05 ENCOUNTER — APPOINTMENT (OUTPATIENT)
Dept: PULMONOLOGY | Facility: CLINIC | Age: 62
End: 2022-10-05

## 2022-10-05 ENCOUNTER — APPOINTMENT (OUTPATIENT)
Dept: COLORECTAL SURGERY | Facility: CLINIC | Age: 62
End: 2022-10-05

## 2022-10-05 VITALS
OXYGEN SATURATION: 98 % | BODY MASS INDEX: 25.61 KG/M2 | DIASTOLIC BLOOD PRESSURE: 76 MMHG | WEIGHT: 150 LBS | RESPIRATION RATE: 16 BRPM | HEART RATE: 68 BPM | HEIGHT: 64 IN | SYSTOLIC BLOOD PRESSURE: 126 MMHG

## 2022-10-05 PROCEDURE — 99214 OFFICE O/P EST MOD 30 MIN: CPT

## 2022-10-05 PROCEDURE — 99204 OFFICE O/P NEW MOD 45 MIN: CPT

## 2022-10-05 NOTE — ASSESSMENT
[FreeTextEntry1] : Ms. Arce presents to the office for consultation.  She was recently in [facility] for enterocolitis as noted on CT A/P.  Follow-up CT demonstrates complete resolution.  She continues to have some left-sided abdominal pain though the etiology is unclear given the negative CT as well as a colonoscopy within the past year that was otherwise unremarkable.  She notes that as an adolescent, she was told she has endometriosis and that one of her ovaries is adherent to the colon.  I discussed with her that there is no indication for colectomy given this history, particularly if she is postmenopausal.  I recommended she return to her original gastroenterologist who performed her colonoscopy to update him on the recent bout of enterocolitis.\par She relayed to me also regarding a constellation of symptoms that have otherwise been inexplicable by multiple specialists.  She expounded on this history by showing me multiple photos of her hand with superficial lesions, ecchymoses as well as part of a swollen face and tongue.  She notes that symptoms are mostly left-sided and she believes there is something arising internally from the left side of her body.  Unfortunately, I do not have very much to offer her or reassure her from the colorectal surgery standpoint.  I do believe further follow-up to rule out connective tissue disorder would be of utility.  Follow-up as needed.

## 2022-10-05 NOTE — HISTORY OF PRESENT ILLNESS
[FreeTextEntry1] : Ms. Arce presents to the office for consultation. She was recently hospitalized at  for enterocolitis that was identified on CT in mid September. Followup CT A/P from 9/21/22 demonstrates resolution of enterocolitis. Pt reports chronic left sided abdominal discomfort as well as constellation of issues along the left side of her body. She passes stools daily with the help of stool softeners every other day. Last colonoscopy 1 year prior without obvious findings. She has seen a CRS in past who has performed CT A/P, also without concerning findings. She is frustrated as she experiences a myriad of symptoms that are otherwise inexplicable and without a source for treatment. Post 9/11 survivor.

## 2022-10-05 NOTE — PHYSICAL EXAM
[No Rash or Lesion] : No rash or lesion [Alert] : alert [Oriented to Person] : oriented to person [Oriented to Place] : oriented to place [Oriented to Time] : oriented to time [Calm] : calm [de-identified] : No apparent distress [de-identified] : Normocephalic atraumatic [de-identified] : Moving all extremities x4

## 2022-10-05 NOTE — HISTORY OF PRESENT ILLNESS
[Former] : former [TextBox_4] : 62F PMH VTE (01/2022) on Eliquis, recurrent idiopathic angioedema, HLD, anxiety who presents for hospital f/u. Pt was discharged from [facility] on 9/3/22; was found with SIRS, given antibiotics for likely facial cellulitis; had CT showing no acute pathology in chest or abdomen. She presented again to [facility] on 9/12-9/14 for generalized rash, pruritus, tongue swelling, abdominal pain of unclear pathology. Had CT abdomen at the time showing enterocolitis and esophagitis. Again presented to [facility] ED on 9/21/22 for abdominal pain, constipation, and was discharged with GI follow-up. Former 0.5ppd x 35 years, quit in 02/2022. She c/o skin rash after heat pack on lower abdomen. Also with stiffness in her hands and ankles. Also with on and off fevers.  [TextBox_11] : 0.5 [TextBox_13] : 35 [YearQuit] : 2022

## 2022-10-09 ENCOUNTER — EMERGENCY (EMERGENCY)
Facility: HOSPITAL | Age: 62
LOS: 1 days | Discharge: HOME | End: 2022-10-09
Attending: EMERGENCY MEDICINE
Payer: COMMERCIAL

## 2022-10-09 VITALS
HEART RATE: 118 BPM | HEIGHT: 64 IN | OXYGEN SATURATION: 95 % | DIASTOLIC BLOOD PRESSURE: 74 MMHG | SYSTOLIC BLOOD PRESSURE: 108 MMHG | RESPIRATION RATE: 20 BRPM | TEMPERATURE: 98 F

## 2022-10-09 VITALS
HEART RATE: 89 BPM | DIASTOLIC BLOOD PRESSURE: 72 MMHG | SYSTOLIC BLOOD PRESSURE: 113 MMHG | OXYGEN SATURATION: 99 % | RESPIRATION RATE: 18 BRPM

## 2022-10-09 DIAGNOSIS — Z87.81 PERSONAL HISTORY OF (HEALED) TRAUMATIC FRACTURE: Chronic | ICD-10-CM

## 2022-10-09 DIAGNOSIS — Z98.890 OTHER SPECIFIED POSTPROCEDURAL STATES: Chronic | ICD-10-CM

## 2022-10-09 LAB
ALBUMIN SERPL ELPH-MCNC: 3.3 G/DL — SIGNIFICANT CHANGE UP (ref 3.3–5.2)
ALP SERPL-CCNC: 86 U/L — SIGNIFICANT CHANGE UP (ref 40–120)
ALT FLD-CCNC: 16 U/L — SIGNIFICANT CHANGE UP
ANION GAP SERPL CALC-SCNC: 12 MMOL/L — SIGNIFICANT CHANGE UP (ref 5–17)
AST SERPL-CCNC: 20 U/L — SIGNIFICANT CHANGE UP
BASOPHILS # BLD AUTO: 0.01 K/UL — SIGNIFICANT CHANGE UP (ref 0–0.2)
BASOPHILS NFR BLD AUTO: 0.2 % — SIGNIFICANT CHANGE UP (ref 0–2)
BILIRUB SERPL-MCNC: 0.4 MG/DL — SIGNIFICANT CHANGE UP (ref 0.4–2)
BUN SERPL-MCNC: 11.3 MG/DL — SIGNIFICANT CHANGE UP (ref 8–20)
CALCIUM SERPL-MCNC: 8.7 MG/DL — SIGNIFICANT CHANGE UP (ref 8.4–10.5)
CHLORIDE SERPL-SCNC: 104 MMOL/L — SIGNIFICANT CHANGE UP (ref 98–107)
CO2 SERPL-SCNC: 23 MMOL/L — SIGNIFICANT CHANGE UP (ref 22–29)
CREAT SERPL-MCNC: 1.05 MG/DL — SIGNIFICANT CHANGE UP (ref 0.5–1.3)
EGFR: 60 ML/MIN/1.73M2 — SIGNIFICANT CHANGE UP
EOSINOPHIL # BLD AUTO: 0.03 K/UL — SIGNIFICANT CHANGE UP (ref 0–0.5)
EOSINOPHIL NFR BLD AUTO: 0.7 % — SIGNIFICANT CHANGE UP (ref 0–6)
GLUCOSE SERPL-MCNC: 121 MG/DL — HIGH (ref 70–99)
HCT VFR BLD CALC: 45.4 % — HIGH (ref 34.5–45)
HGB BLD-MCNC: 15.3 G/DL — SIGNIFICANT CHANGE UP (ref 11.5–15.5)
IMM GRANULOCYTES NFR BLD AUTO: 0 % — SIGNIFICANT CHANGE UP (ref 0–0.9)
LYMPHOCYTES # BLD AUTO: 1.04 K/UL — SIGNIFICANT CHANGE UP (ref 1–3.3)
LYMPHOCYTES # BLD AUTO: 25.4 % — SIGNIFICANT CHANGE UP (ref 13–44)
MCHC RBC-ENTMCNC: 31 PG — SIGNIFICANT CHANGE UP (ref 27–34)
MCHC RBC-ENTMCNC: 33.7 GM/DL — SIGNIFICANT CHANGE UP (ref 32–36)
MCV RBC AUTO: 92.1 FL — SIGNIFICANT CHANGE UP (ref 80–100)
MONOCYTES # BLD AUTO: 0.21 K/UL — SIGNIFICANT CHANGE UP (ref 0–0.9)
MONOCYTES NFR BLD AUTO: 5.1 % — SIGNIFICANT CHANGE UP (ref 2–14)
NEUTROPHILS # BLD AUTO: 2.81 K/UL — SIGNIFICANT CHANGE UP (ref 1.8–7.4)
NEUTROPHILS NFR BLD AUTO: 68.6 % — SIGNIFICANT CHANGE UP (ref 43–77)
PLATELET # BLD AUTO: 246 K/UL — SIGNIFICANT CHANGE UP (ref 150–400)
POTASSIUM SERPL-MCNC: 4.5 MMOL/L — SIGNIFICANT CHANGE UP (ref 3.5–5.3)
POTASSIUM SERPL-SCNC: 4.5 MMOL/L — SIGNIFICANT CHANGE UP (ref 3.5–5.3)
PROT SERPL-MCNC: 6 G/DL — LOW (ref 6.6–8.7)
RBC # BLD: 4.93 M/UL — SIGNIFICANT CHANGE UP (ref 3.8–5.2)
RBC # FLD: 13 % — SIGNIFICANT CHANGE UP (ref 10.3–14.5)
SODIUM SERPL-SCNC: 139 MMOL/L — SIGNIFICANT CHANGE UP (ref 135–145)
WBC # BLD: 4.1 K/UL — SIGNIFICANT CHANGE UP (ref 3.8–10.5)
WBC # FLD AUTO: 4.1 K/UL — SIGNIFICANT CHANGE UP (ref 3.8–10.5)

## 2022-10-09 PROCEDURE — 99284 EMERGENCY DEPT VISIT MOD MDM: CPT

## 2022-10-09 PROCEDURE — 96361 HYDRATE IV INFUSION ADD-ON: CPT

## 2022-10-09 PROCEDURE — 80053 COMPREHEN METABOLIC PANEL: CPT

## 2022-10-09 PROCEDURE — 36415 COLL VENOUS BLD VENIPUNCTURE: CPT

## 2022-10-09 PROCEDURE — 96375 TX/PRO/DX INJ NEW DRUG ADDON: CPT

## 2022-10-09 PROCEDURE — 96374 THER/PROPH/DIAG INJ IV PUSH: CPT

## 2022-10-09 PROCEDURE — 85025 COMPLETE CBC W/AUTO DIFF WBC: CPT

## 2022-10-09 PROCEDURE — 99284 EMERGENCY DEPT VISIT MOD MDM: CPT | Mod: 25

## 2022-10-09 RX ORDER — SODIUM CHLORIDE 9 MG/ML
1000 INJECTION INTRAMUSCULAR; INTRAVENOUS; SUBCUTANEOUS ONCE
Refills: 0 | Status: COMPLETED | OUTPATIENT
Start: 2022-10-09 | End: 2022-10-09

## 2022-10-09 RX ORDER — DIPHENHYDRAMINE HCL 50 MG
50 CAPSULE ORAL ONCE
Refills: 0 | Status: COMPLETED | OUTPATIENT
Start: 2022-10-09 | End: 2022-10-09

## 2022-10-09 RX ORDER — FAMOTIDINE 10 MG/ML
20 INJECTION INTRAVENOUS ONCE
Refills: 0 | Status: COMPLETED | OUTPATIENT
Start: 2022-10-09 | End: 2022-10-09

## 2022-10-09 RX ORDER — METOCLOPRAMIDE HCL 10 MG
10 TABLET ORAL ONCE
Refills: 0 | Status: COMPLETED | OUTPATIENT
Start: 2022-10-09 | End: 2022-10-09

## 2022-10-09 RX ADMIN — Medication 50 MILLIGRAM(S): at 08:08

## 2022-10-09 RX ADMIN — FAMOTIDINE 20 MILLIGRAM(S): 10 INJECTION INTRAVENOUS at 08:08

## 2022-10-09 RX ADMIN — Medication 125 MILLIGRAM(S): at 08:08

## 2022-10-09 RX ADMIN — SODIUM CHLORIDE 1000 MILLILITER(S): 9 INJECTION INTRAMUSCULAR; INTRAVENOUS; SUBCUTANEOUS at 08:08

## 2022-10-09 RX ADMIN — Medication 10 MILLIGRAM(S): at 08:08

## 2022-10-09 NOTE — ED ADULT NURSE NOTE - OBJECTIVE STATEMENT
pt alert and oriented x4 comes in c/o allergic reaction to unknown substance. pt states she went to [place] yesterday due to SOB went home, had pruritic rash gave self epi pen and came to ER. blotching noted to patients skin with redness. denies SOB or throat swelling. able to speak in full sentances.

## 2022-10-09 NOTE — ED PROVIDER NOTE - PATIENT PORTAL LINK FT
You can access the FollowMyHealth Patient Portal offered by [facility] by registering at the following website: [email or web address]. By joining [facility]’s FollowMyHealth portal, you will also be able to view your health information using other applications (apps) compatible with our system.

## 2022-10-09 NOTE — ED ADULT NURSE NOTE - NS ED NOTE ABUSE RESPONSE YN
Chief Complaint: _abdominal cramping   History of Present Illness: _32yo  @ 17w0d d/b LMP c/w 6 wks u/s with h/o GDMA2 and DVT in previous pregnancy that presents with worsening abdominal cramping x 3 hours. Pt reports severe, contraction like abdominal cramping that started around 1330 this afternoon. Pt denies any vaginal bleeding until she was enroute to OB triage. Pt denies any LOF. Pt denies any fever, chills or dysuria. Pt also denies any abdominal trauma. Pt is currently on ppx enoxaparin 40 mg qdaily with last dose this AM at 0900. Pt is also taking metformin 500 mg po qAM.   Past OB History:  2013 39w0d  M 7lbs 5 oz c/b DVT in pregnancy   Past Medical History: _  Severe Depression   Chronic back pain  Obesity   Substance Abuse (MJ and T#3)   Asthma   PCOS   Past Surgical History: _  Excisional biopsy of neck tumor   Family History: _  Mother--> ETOH use   Father--> ETOH abuse; Depression   Social History: _  +tobacco use; Denies ETOH, MJ or opioid use   Allergies: _  Cephalexin (Hives)  Latex  Medications: _ Medication Reconciliation Form Reviewed  Review of Systems:  Constitutional: _Denies f/c/fatigue   Respiratory: _Denies SOB or wheezing   Cardiovascular: _Denies heart palpitations or CP   Gastrointestinal: _+ abdominal pain; Denies n/v/c/d  Genitourinary: _Denies dysuria; SEE HPI   Musculoskeletal: _Denies muscle weakness or pain   Skin: _Denies pallor or easy bruising   Neurological: _Denies ha or paresthesia   Endocrine: _Denies heat or cold intolerance   Psychiatric: _Denies depression; + anxiety       Physical Examination:   Constitutional: _ , vital signs reviewed; grimacing in pain with uterine contractions   Eyes: sclera clear   Respiratory: _ NO labored breathing; CTAB   Cardiovascular: _RRR;   Chest: _symmetrical chest excursion   Gastrointestinal: _soft/ND/+tenderness above umbilicus near fundal region  Genitourinary: _large amount of blood and clots on the perineum;  Bulging amniotic sac within the vagina; No visible cervix noted  Lymph: _NO LAD   Musculoskeletal: _muscle strength 5/5   Skin: _W/D/I   Neurologic: _CN II-XII grossly intact   Psychiatric: _anxious   FHT: -+ fetal heart tones noted on bedside u/s      Labs: _ pending     Radiology: _n/a     A/P: 31 yo  @ 17w0d admitted for previable  labor with bulging amniotic sac  -CBC, coags, CMP, GC/CT, ucx and utox ordered  -continuous tocometry ordered  -FHT qshift  -Pt to be placed in reverse Trendelenburg position   -MFM consult placed for evaluation of possible emergency cervical cerclage   -.9NS @ 125ml/hr  -NPO   -dilaudid 1 mg IV q2hr prn   -accucheck q6hrs   -HOLD lovenox              Electronically Signed On 2020 17:07  ___________________________________________________   Linda Vance MD              COVID 19 NS swab to be collected per L&D protocol            Electronically Signed On 2020 17:11  ___________________________________________________   Linda Vance MD     Yes

## 2022-10-09 NOTE — ED PROVIDER NOTE - CLINICAL SUMMARY MEDICAL DECISION MAKING FREE TEXT BOX
non toxic, received epi PTA, no oral swelling/airway concern, very minimal facial swelling. mild urticarial rash throughout. c/o headache. antihistamines, steroids, observe and reassess.

## 2022-10-09 NOTE — ED PROVIDER NOTE - PROGRESS NOTE DETAILS
Ant: pt feeling much better, resting comfortably. wants d/c, has a ride. has epi pen/benadryl. will give short course steroids. return precautions. no swelling to face and rash improved. stable for d/c.

## 2022-10-09 NOTE — ED ADULT TRIAGE NOTE - CHIEF COMPLAINT QUOTE
patient presents c/o facial swelling since last night, states she was seen in another ED and given PO benadryl and IVF and sent home, upon arrival home administered IM epi to herself at 5AM for itchy rash that developed. unknown allergy hx. speaking comfortably in full sentences in triage, resp even and unlabored.

## 2022-10-09 NOTE — ED PROVIDER NOTE - PHYSICAL EXAMINATION
Gen: No acute distress, non toxic  HEENT: Mucous membranes moist, pink conjunctivae, EOMI. minimal facial swelling. no oropharyngeal swelling. tolerating secretions.  CV: RRR, nl s1/s2.  Resp: CTAB, normal rate and effort  GI: Abdomen soft, NT, ND. No rebound, no guarding  : No CVAT  Neuro: A&O x 3, moving all 4 extremities  MSK: No spine or joint tenderness to palpation  Skin: diffuse wheals/urticaria/blanching, mostly over torso/back. some on neck.

## 2022-10-09 NOTE — ED ADULT NURSE NOTE - SUICIDE SCREENING QUESTION 3
Physical Therapy  [address]    Physical Therapy Progress Note    Date: 3/29/22  Patient Name: Jensen Hightower       Room: 8-  MRN: [id number]   Account: [de-identified]   : 1943  (74 y.o.)   Gender: female     Discharge Recommendations   Continue to assess pending progress,Home with assist PRN  Equipment Needed: No    Referring Practitioner: Oniel Ac MD  Diagnosis: hypokalemia  Restrictions/Precautions: Fall Risk,Contact Precautions   Past Medical History:  has a past medical history of CAD (coronary artery disease), Depression, History of blood transfusion, Hyperlipidemia, Hypertension, Hypothyroidism, Non-healing wound of lower extremity, and Osteoarthritis. Past Surgical History:   has a past surgical history that includes Colonoscopy (); Coronary angioplasty with stent (); Cholecystectomy; Hysterectomy; Colonoscopy (12); Colonoscopy (2012); orif femur decompression; Tibia fracture surgery; and pr knee scope,diagnostic (Left, 10/11/2018). Overall Orientation Status: Within Normal Limits  Restrictions/Precautions  Restrictions/Precautions: Fall Risk;Contact Precautions  Required Braces or Orthoses?: Yes    Subjective: Patient up in bedside chair upon my arrival.  She is agreeable to amb. and reports she is plannig for DC this PM.  Comments: OK for therapy per nursing. Vital Signs  Patient Currently in Pain: Denies                   Bed Mobility:   Scooting: Stand by assistance (To edge of bedside chair.)  Comment: Patient up in bedside chair upon my arrival.      Transfers:  Sit to Stand: Contact guard assistance  Stand to sit: Contact guard assistance  Bed to Chair: Contact guard assistance              Ambulation 1  Surface: level tile  Device: Single point cane  Assistance: Contact guard assistance  Gait Deviations: Slow Kristin;Decreased step length;Decreased step height  Distance: 140 ft x 2  Comments: Break between amb. Stairs/Curb  Stairs?: Yes  Stairs  # Steps : 6  Stairs Height: 4\"  Rails: None  Device: Hand Held Assist;Single pt cane  Assistance: Contact guard assistance  Comment: SPC in R hand, hand held assist on L side                Sitting - Static: Good  Sitting - Dynamic: Good  Standing - Static: Fair;+ (SBA)  Standing - Dynamic: Fair (CGA)     Other exercises?: Yes  Other exercises 2: seated B LE exercises x10 reps   Other Activities  Comment: Breaks given PRN        Activity Tolerance: Patient Tolerated treatment well;Patient limited by endurance  PT Equipment Recommendations  Equipment Needed: No       Assessment  Activity Tolerance: Patient Tolerated treatment well;Patient limited by endurance   Body structures, Functions, Activity limitations: Decreased functional mobility   Discharge Recommendations: Continue to assess pending progress;Home with assist PRN     Type of devices: Call light within reach;Gait belt;Left in chair     Plan  Times per week: 4-5x/wk  Current Treatment Recommendations: Functional Mobility Training,Gait Training,Stair training,Safety Education & Training      Goals  Short term goals  Time Frame for Short term goals: 4-5 days  Short term goal 1: mod-I bed mobiltiy  Short term goal 2: mod-I transfers  Short term goal 3: mod-I gait with cane x 150ft  Short term goal 4: negotiate 5-6 steps mod-I with rail/cane    PT Individual Minutes  Time In: 1355  Time Out: 1420  Minutes: 25    Electronically signed by Rola Sol PTA on 3/29/22 at 2:49 PM EDT No

## 2022-10-09 NOTE — ED PROVIDER NOTE - OBJECTIVE STATEMENT
63 y/o female hx aortic thrombus on eliquis, anxiety, recent admission for anaphylaxis last month unknown trigger (though reported after eating pizza) reports pruitic itching throughout body with facial swelling starting last night. states went to [place], was given benadryl and fluids d/aleksey. this morning felt more swelilng around nose and felt like couldn't breath through nose so gave self epi pen at 5am. feels breathing/swelling to face improved but still with pruitic rash and some headache. denies f/c. no vomiting, no chest pain. no new exposures. no difficultys wallowing.     ROS: No fever/chills. No eye pain/changes in vision, No ear pain/sore throat/dysphagia, No chest pain/palpitations. No SOB/cough/. No abdominal pain, N/V/D, no black/bloody bm. No dysuria/frequency/discharge, No headache. No Dizziness.    No numbness/tingling/weakness.

## 2022-10-18 ENCOUNTER — APPOINTMENT (OUTPATIENT)
Dept: INTERNAL MEDICINE | Facility: CLINIC | Age: 62
End: 2022-10-18

## 2022-10-26 ENCOUNTER — APPOINTMENT (OUTPATIENT)
Dept: FAMILY MEDICINE | Facility: CLINIC | Age: 62
End: 2022-10-26

## 2022-10-26 VITALS
WEIGHT: 153 LBS | RESPIRATION RATE: 16 BRPM | HEART RATE: 82 BPM | DIASTOLIC BLOOD PRESSURE: 80 MMHG | HEIGHT: 64 IN | BODY MASS INDEX: 26.12 KG/M2 | SYSTOLIC BLOOD PRESSURE: 124 MMHG | TEMPERATURE: 98.2 F | OXYGEN SATURATION: 98 %

## 2022-10-26 PROCEDURE — 36415 COLL VENOUS BLD VENIPUNCTURE: CPT

## 2022-10-26 PROCEDURE — 99214 OFFICE O/P EST MOD 30 MIN: CPT | Mod: 25

## 2022-10-26 RX ORDER — METHYLPREDNISOLONE 4 MG/1
4 TABLET ORAL
Qty: 21 | Refills: 0 | Status: COMPLETED | COMMUNITY
Start: 2022-09-14

## 2022-10-26 RX ORDER — FAMOTIDINE 20 MG/1
20 TABLET, FILM COATED ORAL
Qty: 90 | Refills: 0 | Status: COMPLETED | COMMUNITY
Start: 2022-10-04

## 2022-10-26 RX ORDER — CEPHALEXIN 500 MG/1
500 CAPSULE ORAL
Qty: 28 | Refills: 0 | Status: COMPLETED | COMMUNITY
Start: 2022-09-03

## 2022-10-26 RX ORDER — EPINEPHRINE 0.3 MG/.3ML
0.3 INJECTION INTRAMUSCULAR
Qty: 2 | Refills: 0 | Status: COMPLETED | COMMUNITY
Start: 2022-09-14

## 2022-10-26 RX ORDER — NITROFURANTOIN (MONOHYDRATE/MACROCRYSTALS) 25; 75 MG/1; MG/1
100 CAPSULE ORAL
Qty: 28 | Refills: 1 | Status: DISCONTINUED | COMMUNITY
Start: 2022-08-29 | End: 2022-10-26

## 2022-10-26 RX ORDER — PREDNISONE 50 MG/1
50 TABLET ORAL
Qty: 3 | Refills: 0 | Status: COMPLETED | COMMUNITY
Start: 2022-10-09

## 2022-10-26 RX ORDER — NACL/NAHCO3/HYALURON SOD/ALOE 0.9 %
SPRAY GEL (ML) NASAL
Qty: 90 | Refills: 0 | Status: COMPLETED | COMMUNITY
Start: 2022-09-22

## 2022-10-26 NOTE — REVIEW OF SYSTEMS
[Patient Intake Form Reviewed] : Patient intake form was reviewed [Fatigue] : fatigue [Dysuria] : dysuria [Negative] : Musculoskeletal [Joint Pain] : no joint pain [Joint Stiffness] : no joint stiffness [Muscle Pain] : no muscle pain [Back Pain] : no back pain [FreeTextEntry2] : Overweight [FreeTextEntry5] : MIYA [FreeTextEntry6] : Smoking [FreeTextEntry7] : GERD

## 2022-10-26 NOTE — HEALTH RISK ASSESSMENT
[Yes] : Yes [Monthly or less (1 pt)] : Monthly or less (1 point) [1 or 2 (0 pts)] : 1 or 2 (0 points) [Never (0 pts)] : Never (0 points) [No] : In the past 12 months have you used drugs other than those required for medical reasons? No [No falls in past year] : Patient reported no falls in the past year [0] : 2) Feeling down, depressed, or hopeless: Not at all (0) [PHQ-2 Negative - No further assessment needed] : PHQ-2 Negative - No further assessment needed [Patient/Caregiver not ready to engage] : , patient/caregiver not ready to engage [I will adhere to the patient's wishes.] : I will adhere to the patient's wishes. [Time Spent: ___ minutes] : Time Spent: [unfilled] minutes [Audit-CScore] : 1 [de-identified] : Average [de-identified] : Average [facility]] : 9 [[id number]] : 0 [AdvancecareDate] : 10/21

## 2022-10-26 NOTE — PHYSICAL EXAM
[No Acute Distress] : no acute distress [Well Nourished] : well nourished [Well Developed] : well developed [Well-Appearing] : well-appearing [Normal Sclera/Conjunctiva] : normal sclera/conjunctiva [PERRL] : pupils equal round and reactive to light [EOMI] : extraocular movements intact [Normal Outer Ear/Nose] : the outer ears and nose were normal in appearance [Normal Oropharynx] : the oropharynx was normal [No JVD] : no jugular venous distention [No Lymphadenopathy] : no lymphadenopathy [Supple] : supple [Thyroid Normal, No Nodules] : the thyroid was normal and there were no nodules present [No Respiratory Distress] : no respiratory distress  [No Accessory Muscle Use] : no accessory muscle use [Clear to Auscultation] : lungs were clear to auscultation bilaterally [Normal Rate] : normal rate  [Regular Rhythm] : with a regular rhythm [Normal S1, S2] : normal S1 and S2 [No Murmur] : no murmur heard [No Carotid Bruits] : no carotid bruits [No Abdominal Bruit] : a ~M bruit was not heard ~T in the abdomen [No Varicosities] : no varicosities [Pedal Pulses Present] : the pedal pulses are present [No Edema] : there was no peripheral edema [No Palpable Aorta] : no palpable aorta [No Extremity Clubbing/Cyanosis] : no extremity clubbing/cyanosis [Soft] : abdomen soft [Non Tender] : non-tender [Non-distended] : non-distended [No Masses] : no abdominal mass palpated [No HSM] : no HSM [Normal Bowel Sounds] : normal bowel sounds [Normal Posterior Cervical Nodes] : no posterior cervical lymphadenopathy [Normal Anterior Cervical Nodes] : no anterior cervical lymphadenopathy [No CVA Tenderness] : no CVA  tenderness [No Spinal Tenderness] : no spinal tenderness [No Joint Swelling] : no joint swelling [Grossly Normal Strength/Tone] : grossly normal strength/tone [No Rash] : no rash [Coordination Grossly Intact] : coordination grossly intact [No Focal Deficits] : no focal deficits [Normal Gait] : normal gait [Deep Tendon Reflexes (DTR)] : deep tendon reflexes were 2+ and symmetric [Normal Affect] : the affect was normal [Normal Insight/Judgement] : insight and judgment were intact [de-identified] : C Spine - Decreased ROM, Pain Associated with Mvt. + Compression/Distraction/Spurling Tests [de-identified] : Decreased ROM, Pain Associated with Mvt.  + Leg Raise Tests. [de-identified] : BL Upper Extremity Weakness [de-identified] : Decreased UE and LE DTR's

## 2022-10-26 NOTE — PLAN
[FreeTextEntry1] : 62 year old female with hx blood clot with recent hospitalizations and dysuria symptoms was in hospital had Vasculitis  and GI Issues has GI eval pending now on Eiliquis \par Chronic Vasculitis \par Heartburn- \par \par

## 2022-10-26 NOTE — HISTORY OF PRESENT ILLNESS
[FreeTextEntry1] : 62 year old female with hx blood clot with recent hospitalizations and dysuria symptoms was in hospital had Vasculitis  and GI Issues has GI eval pending now on Eiliquis

## 2022-10-27 ENCOUNTER — NON-APPOINTMENT (OUTPATIENT)
Age: 62
End: 2022-10-27

## 2022-10-27 LAB
ALBUMIN SERPL ELPH-MCNC: 4.6 G/DL
ALP BLD-CCNC: 83 U/L
ALT SERPL-CCNC: 18 U/L
ANION GAP SERPL CALC-SCNC: 14 MMOL/L
APPEARANCE: CLEAR
AST SERPL-CCNC: 18 U/L
BASOPHILS # BLD AUTO: 0.07 K/UL
BASOPHILS NFR BLD AUTO: 1 %
BILIRUB SERPL-MCNC: <0.2 MG/DL
BILIRUBIN URINE: NEGATIVE
BLOOD URINE: NEGATIVE
BUN SERPL-MCNC: 16 MG/DL
CALCIUM SERPL-MCNC: 10 MG/DL
CHLORIDE SERPL-SCNC: 103 MMOL/L
CO2 SERPL-SCNC: 24 MMOL/L
COLOR: COLORLESS
CREAT SERPL-MCNC: 1.06 MG/DL
EGFR: 59 ML/MIN/1.73M2
EOSINOPHIL # BLD AUTO: 0.45 K/UL
EOSINOPHIL NFR BLD AUTO: 6.7 %
GLUCOSE QUALITATIVE U: NEGATIVE
GLUCOSE SERPL-MCNC: 89 MG/DL
HCT VFR BLD CALC: 44.5 %
HGB BLD-MCNC: 13.8 G/DL
IMM GRANULOCYTES NFR BLD AUTO: 0.1 %
KETONES URINE: NEGATIVE
LEUKOCYTE ESTERASE URINE: NEGATIVE
LYMPHOCYTES # BLD AUTO: 2.89 K/UL
LYMPHOCYTES NFR BLD AUTO: 43.3 %
MAN DIFF?: NORMAL
MCHC RBC-ENTMCNC: 30.3 PG
MCHC RBC-ENTMCNC: 31 GM/DL
MCV RBC AUTO: 97.6 FL
MONOCYTES # BLD AUTO: 0.46 K/UL
MONOCYTES NFR BLD AUTO: 6.9 %
NEUTROPHILS # BLD AUTO: 2.8 K/UL
NEUTROPHILS NFR BLD AUTO: 42 %
NITRITE URINE: NEGATIVE
PH URINE: 6
PLATELET # BLD AUTO: 305 K/UL
POTASSIUM SERPL-SCNC: 4.4 MMOL/L
PROT SERPL-MCNC: 7 G/DL
PROTEIN URINE: NEGATIVE
RBC # BLD: 4.56 M/UL
RBC # FLD: 13.5 %
SODIUM SERPL-SCNC: 141 MMOL/L
SPECIFIC GRAVITY URINE: 1.01
UROBILINOGEN URINE: NORMAL
WBC # FLD AUTO: 6.68 K/UL

## 2022-11-03 RX ORDER — PANTOPRAZOLE 40 MG/1
40 TABLET, DELAYED RELEASE ORAL
Qty: 90 | Refills: 1 | Status: DISCONTINUED | COMMUNITY
Start: 2017-03-10 | End: 2022-11-03

## 2022-12-06 ENCOUNTER — APPOINTMENT (OUTPATIENT)
Dept: PULMONOLOGY | Facility: CLINIC | Age: 62
End: 2022-12-06

## 2023-01-05 ENCOUNTER — RESULT CHARGE (OUTPATIENT)
Age: 63
End: 2023-01-05

## 2023-01-05 ENCOUNTER — APPOINTMENT (OUTPATIENT)
Dept: FAMILY MEDICINE | Facility: CLINIC | Age: 63
End: 2023-01-05
Payer: MEDICARE

## 2023-01-05 VITALS
HEIGHT: 64 IN | BODY MASS INDEX: 25.95 KG/M2 | WEIGHT: 152 LBS | SYSTOLIC BLOOD PRESSURE: 126 MMHG | OXYGEN SATURATION: 98 % | DIASTOLIC BLOOD PRESSURE: 80 MMHG | TEMPERATURE: 98.3 F | RESPIRATION RATE: 16 BRPM | HEART RATE: 76 BPM

## 2023-01-05 PROCEDURE — 99214 OFFICE O/P EST MOD 30 MIN: CPT | Mod: 25

## 2023-01-05 NOTE — REVIEW OF SYSTEMS
[Patient Intake Form Reviewed] : Patient intake form was reviewed [Negative] : Heme/Lymph [Fatigue] : no fatigue [Joint Pain] : no joint pain [Joint Stiffness] : no joint stiffness [Muscle Pain] : no muscle pain [Back Pain] : no back pain [FreeTextEntry2] : Overweight [FreeTextEntry4] : phlegm in throat [FreeTextEntry5] : MIYA [FreeTextEntry6] : Smoking [FreeTextEntry7] : GERD

## 2023-01-05 NOTE — HISTORY OF PRESENT ILLNESS
[FreeTextEntry1] : follow up [de-identified] : 62 year old female with hx blood clot presents for follow up. She is concerned today for a UTI. She also endorses phlegm in her throat for the past year. She is seeing ENT in march. Denies any fevers, chills, night sweats, fatigue, chest pain, SOB, abdominal pain, N/V/D, dizziness, headache.

## 2023-01-05 NOTE — PLAN
[FreeTextEntry1] : 62 year old female with hx blood clot presents for follow up. She is concerned today for a UTI. She also endorses phlegm in her throat for the past year. She is seeing ENT in march. Denies any fevers, chills, night sweats, fatigue, chest pain, SOB, abdominal pain, N/V/D, dizziness, headache. \par \par Care plan reviewed\par Labs reviewed\par Meds reviewed

## 2023-02-06 ENCOUNTER — APPOINTMENT (OUTPATIENT)
Dept: FAMILY MEDICINE | Facility: CLINIC | Age: 63
End: 2023-02-06
Payer: MEDICARE

## 2023-02-06 ENCOUNTER — RESULT CHARGE (OUTPATIENT)
Age: 63
End: 2023-02-06

## 2023-02-06 VITALS
WEIGHT: 154 LBS | RESPIRATION RATE: 16 BRPM | TEMPERATURE: 97.9 F | OXYGEN SATURATION: 98 % | HEIGHT: 64 IN | DIASTOLIC BLOOD PRESSURE: 80 MMHG | BODY MASS INDEX: 26.29 KG/M2 | HEART RATE: 81 BPM | SYSTOLIC BLOOD PRESSURE: 130 MMHG

## 2023-02-06 PROCEDURE — 99214 OFFICE O/P EST MOD 30 MIN: CPT | Mod: 25

## 2023-02-06 PROCEDURE — 81003 URINALYSIS AUTO W/O SCOPE: CPT | Mod: QW

## 2023-02-06 RX ORDER — GABAPENTIN 100 MG/1
100 CAPSULE ORAL 3 TIMES DAILY
Qty: 90 | Refills: 0 | Status: DISCONTINUED | COMMUNITY
Start: 2022-08-22 | End: 2023-02-06

## 2023-02-07 NOTE — HISTORY OF PRESENT ILLNESS
[FreeTextEntry1] : 62 year old female with hx blood clot with recent hospitalizations and dysuria symptoms was in hospital had Vasculitis   now on Eiliquis

## 2023-02-07 NOTE — HEALTH RISK ASSESSMENT
[Yes] : Yes [Monthly or less (1 pt)] : Monthly or less (1 point) [1 or 2 (0 pts)] : 1 or 2 (0 points) [Never (0 pts)] : Never (0 points) [No] : In the past 12 months have you used drugs other than those required for medical reasons? No [No falls in past year] : Patient reported no falls in the past year [0] : 2) Feeling down, depressed, or hopeless: Not at all (0) [PHQ-2 Negative - No further assessment needed] : PHQ-2 Negative - No further assessment needed [Patient/Caregiver not ready to engage] : , patient/caregiver not ready to engage [I will adhere to the patient's wishes.] : I will adhere to the patient's wishes. [Time Spent: ___ minutes] : Time Spent: [unfilled] minutes [Audit-CScore] : 1 [de-identified] : Average [de-identified] : Average [facility]] : 9 [[id number]] : 0 [AdvancecareDate] : 10/21

## 2023-02-07 NOTE — PHYSICAL EXAM
[No Acute Distress] : no acute distress [Well Nourished] : well nourished [Well Developed] : well developed [Well-Appearing] : well-appearing [Normal Sclera/Conjunctiva] : normal sclera/conjunctiva [PERRL] : pupils equal round and reactive to light [EOMI] : extraocular movements intact [Normal Outer Ear/Nose] : the outer ears and nose were normal in appearance [Normal Oropharynx] : the oropharynx was normal [No JVD] : no jugular venous distention [No Lymphadenopathy] : no lymphadenopathy [Supple] : supple [Thyroid Normal, No Nodules] : the thyroid was normal and there were no nodules present [No Respiratory Distress] : no respiratory distress  [No Accessory Muscle Use] : no accessory muscle use [Clear to Auscultation] : lungs were clear to auscultation bilaterally [Normal Rate] : normal rate  [Regular Rhythm] : with a regular rhythm [Normal S1, S2] : normal S1 and S2 [No Murmur] : no murmur heard [No Carotid Bruits] : no carotid bruits [No Abdominal Bruit] : a ~M bruit was not heard ~T in the abdomen [No Varicosities] : no varicosities [Pedal Pulses Present] : the pedal pulses are present [No Edema] : there was no peripheral edema [No Palpable Aorta] : no palpable aorta [No Extremity Clubbing/Cyanosis] : no extremity clubbing/cyanosis [Soft] : abdomen soft [Non Tender] : non-tender [Non-distended] : non-distended [No Masses] : no abdominal mass palpated [No HSM] : no HSM [Normal Bowel Sounds] : normal bowel sounds [Normal Posterior Cervical Nodes] : no posterior cervical lymphadenopathy [Normal Anterior Cervical Nodes] : no anterior cervical lymphadenopathy [No CVA Tenderness] : no CVA  tenderness [No Spinal Tenderness] : no spinal tenderness [No Joint Swelling] : no joint swelling [Grossly Normal Strength/Tone] : grossly normal strength/tone [No Rash] : no rash [Coordination Grossly Intact] : coordination grossly intact [No Focal Deficits] : no focal deficits [Normal Gait] : normal gait [Deep Tendon Reflexes (DTR)] : deep tendon reflexes were 2+ and symmetric [Normal Affect] : the affect was normal [Normal Insight/Judgement] : insight and judgment were intact [de-identified] : C Spine - Decreased ROM, Pain Associated with Mvt. + Compression/Distraction/Spurling Tests [de-identified] : Decreased ROM, Pain Associated with Mvt.  + Leg Raise Tests. [de-identified] : BL Upper Extremity Weakness [de-identified] : Decreased UE and LE DTR's

## 2023-02-07 NOTE — REVIEW OF SYSTEMS
[Patient Intake Form Reviewed] : Patient intake form was reviewed [Fatigue] : fatigue [Dysuria] : dysuria [Negative] : Heme/Lymph [Joint Pain] : no joint pain [Joint Stiffness] : no joint stiffness [Muscle Pain] : no muscle pain [Back Pain] : no back pain [FreeTextEntry2] : Overweight [FreeTextEntry5] : MIYA [FreeTextEntry6] : Smoking [FreeTextEntry7] : GERD

## 2023-03-09 ENCOUNTER — APPOINTMENT (OUTPATIENT)
Dept: FAMILY MEDICINE | Facility: CLINIC | Age: 63
End: 2023-03-09
Payer: MEDICARE

## 2023-03-09 VITALS
SYSTOLIC BLOOD PRESSURE: 126 MMHG | WEIGHT: 156 LBS | OXYGEN SATURATION: 98 % | TEMPERATURE: 98.1 F | HEIGHT: 64 IN | RESPIRATION RATE: 16 BRPM | DIASTOLIC BLOOD PRESSURE: 80 MMHG | HEART RATE: 84 BPM | BODY MASS INDEX: 26.63 KG/M2

## 2023-03-09 DIAGNOSIS — R10.9 UNSPECIFIED ABDOMINAL PAIN: ICD-10-CM

## 2023-03-09 PROCEDURE — 99214 OFFICE O/P EST MOD 30 MIN: CPT

## 2023-03-09 NOTE — PROGRESS NOTE ADULT - SUBJECTIVE AND OBJECTIVE BOX
Hospitalist Daily Progress Note    Chief Complaint:  Patient is a 62y old  Female who presents with a chief complaint of sirs/ cp (01 Sep 2022 16:04)      SUBJECTIVE / OVERNIGHT EVENTS:  Patient was seen and examined at bedside. Seen for SIRS.   Patient denies chest pain, SOB, abd pain, N/V, fever, chills, dysuria or any other complaints. All remainder ROS negative.     MEDICATIONS  (STANDING):  albuterol/ipratropium for Nebulization 3 milliLiter(s) Nebulizer every 6 hours  apixaban 5 milliGRAM(s) Oral every 12 hours  aspirin enteric coated 81 milliGRAM(s) Oral daily  atorvastatin 20 milliGRAM(s) Oral at bedtime  gabapentin 100 milliGRAM(s) Oral two times a day  lactobacillus acidophilus 1 Tablet(s) Oral two times a day with meals  pantoprazole    Tablet 40 milliGRAM(s) Oral before breakfast  piperacillin/tazobactam IVPB.. 3.375 Gram(s) IV Intermittent every 8 hours  predniSONE   Tablet 40 milliGRAM(s) Oral daily  sodium chloride 0.9%. 1000 milliLiter(s) (125 mL/Hr) IV Continuous <Continuous>    MEDICATIONS  (PRN):  acetaminophen     Tablet .. 650 milliGRAM(s) Oral every 6 hours PRN Temp greater or equal to 38C (100.4F), Mild Pain (1 - 3), Moderate Pain (4 - 6)  ALPRAZolam 1 milliGRAM(s) Oral every 12 hours PRN anxiety  ondansetron Injectable 4 milliGRAM(s) IV Push every 6 hours PRN Nausea and/or Vomiting        I&O's Summary      PHYSICAL EXAM:  Vital Signs Last 24 Hrs  T(C): 36.7 (02 Sep 2022 11:34), Max: 37.1 (01 Sep 2022 18:46)  T(F): 98.1 (02 Sep 2022 11:34), Max: 98.7 (01 Sep 2022 18:46)  HR: 84 (02 Sep 2022 11:34) (56 - 86)  BP: 94/55 (02 Sep 2022 11:34) (94/55 - 116/68)  BP(mean): --  RR: 19 (02 Sep 2022 11:34) (19 - 20)  SpO2: 98% (02 Sep 2022 11:34) (97% - 99%)    Parameters below as of 02 Sep 2022 12:55  Patient On (Oxygen Delivery Method): nasal cannula          Constitutional: NAD, Resting, on NC  ENT: Supple, No JVD  Lungs: CTA B/L, Non-labored breathing  Cardio: RRR, S1/S2, No murmur  Abdomen: Soft, Nontender, Nondistended; Bowel sounds present  Extremities: No calf tenderness, No pitting edema  Musculoskeletal:   No clubbing or cyanosis of digits; no joint swelling or tenderness to palpation  Psych: Calm, cooperative affect appropriate  Neuro: Awake and alert, oriented x 4  Skin: No rashes; no palpable lesions    LABS:                        10.5   11.03 )-----------( 287      ( 02 Sep 2022 02:47 )             32.1     09-    143  |  111<H>  |  13.2  ----------------------------<  214<H>  4.5   |  24.0  |  0.84    Ca    8.7      02 Sep 2022 02:47  Mg     1.8     08-    TPro  5.4<L>  /  Alb  3.0<L>  /  TBili  <0.2<L>  /  DBili  x   /  AST  12  /  ALT  22  /  AlkPhos  67  09-02      CARDIAC MARKERS ( 01 Sep 2022 00:06 )  x     / <0.01 ng/mL / x     / x     / x      CARDIAC MARKERS ( 31 Aug 2022 17:00 )  x     / <0.01 ng/mL / x     / x     / x          Urinalysis Basic - ( 31 Aug 2022 14:42 )    Color: Yellow / Appearance: Clear / S.010 / pH: x  Gluc: x / Ketone: Negative  / Bili: Negative / Urobili: Negative mg/dL   Blood: x / Protein: Negative / Nitrite: Negative   Leuk Esterase: Negative / RBC: x / WBC x   Sq Epi: x / Non Sq Epi: x / Bacteria: x        Culture - Urine (collected 31 Aug 2022 14:42)  Source: Clean Catch Clean Catch (Midstream)  Final Report (01 Sep 2022 20:57):    <10,000 CFU/mL Normal Urogenital Genia    Culture - Blood (collected 31 Aug 2022 10:35)  Source: .Blood Blood-Peripheral  Preliminary Report (01 Sep 2022 17:02):    No growth to date.    Culture - Blood (collected 31 Aug 2022 10:30)  Source: .Blood Blood-Peripheral  Preliminary Report (01 Sep 2022 17:02):    No growth to date.      CAPILLARY BLOOD GLUCOSE            RADIOLOGY REVIEWED   Yes

## 2023-03-14 ENCOUNTER — APPOINTMENT (OUTPATIENT)
Dept: RHEUMATOLOGY | Facility: CLINIC | Age: 63
End: 2023-03-14

## 2023-03-15 NOTE — HISTORY OF PRESENT ILLNESS
[FreeTextEntry1] : follow up [de-identified] : 62 year old female with hx blood clot presents for follow up. She is concerned today for a UTI. She also endorses phlegm in her throat for the past year. She is seeing ENT in march. Denies any fevers, chills, night sweats, fatigue, chest pain, SOB, abdominal pain, N/V/D, dizziness, headache.

## 2023-03-15 NOTE — HEALTH RISK ASSESSMENT
[Yes] : Yes [Monthly or less (1 pt)] : Monthly or less (1 point) [1 or 2 (0 pts)] : 1 or 2 (0 points) [Never (0 pts)] : Never (0 points) [No] : In the past 12 months have you used drugs other than those required for medical reasons? No [No falls in past year] : Patient reported no falls in the past year [0] : 2) Feeling down, depressed, or hopeless: Not at all (0) [PHQ-2 Negative - No further assessment needed] : PHQ-2 Negative - No further assessment needed [Patient/Caregiver not ready to engage] : , patient/caregiver not ready to engage [I will adhere to the patient's wishes.] : I will adhere to the patient's wishes. [Time Spent: ___ minutes] : Time Spent: [unfilled] minutes [Audit-CScore] : 1 [de-identified] : Average [de-identified] : Average [facility] : 9 [[id number]] : 0 [AdvancecareDate] : 10/21

## 2023-04-10 ENCOUNTER — LABORATORY RESULT (OUTPATIENT)
Age: 63
End: 2023-04-10

## 2023-04-10 ENCOUNTER — APPOINTMENT (OUTPATIENT)
Dept: FAMILY MEDICINE | Facility: CLINIC | Age: 63
End: 2023-04-10
Payer: MEDICARE

## 2023-04-10 VITALS
BODY MASS INDEX: 26.12 KG/M2 | SYSTOLIC BLOOD PRESSURE: 118 MMHG | WEIGHT: 153 LBS | HEIGHT: 64 IN | HEART RATE: 67 BPM | DIASTOLIC BLOOD PRESSURE: 72 MMHG | TEMPERATURE: 97.9 F | RESPIRATION RATE: 16 BRPM | OXYGEN SATURATION: 97 %

## 2023-04-10 DIAGNOSIS — M51.26 OTHER INTERVERTEBRAL DISC DISPLACEMENT, LUMBAR REGION: ICD-10-CM

## 2023-04-10 PROCEDURE — 99215 OFFICE O/P EST HI 40 MIN: CPT | Mod: 25

## 2023-04-10 PROCEDURE — 36415 COLL VENOUS BLD VENIPUNCTURE: CPT

## 2023-04-10 NOTE — HEALTH RISK ASSESSMENT
[Yes] : Yes [Monthly or less (1 pt)] : Monthly or less (1 point) [1 or 2 (0 pts)] : 1 or 2 (0 points) [Never (0 pts)] : Never (0 points) [No] : In the past 12 months have you used drugs other than those required for medical reasons? No [No falls in past year] : Patient reported no falls in the past year [0] : 2) Feeling down, depressed, or hopeless: Not at all (0) [PHQ-2 Negative - No further assessment needed] : PHQ-2 Negative - No further assessment needed [Patient/Caregiver not ready to engage] : , patient/caregiver not ready to engage [I will adhere to the patient's wishes.] : I will adhere to the patient's wishes. [Time Spent: ___ minutes] : Time Spent: [unfilled] minutes [Audit-CScore] : 1 [de-identified] : Average [de-identified] : Average [[facility]] : 9 [[id number]] : 0 [AdvancecareDate] : 10/21

## 2023-04-10 NOTE — ASSESSMENT
[FreeTextEntry1] : 62 year old female with hx blood clot presents for follow up. Patient states that she has been having lower abdominal pain, abnormal BM, left sided pain and swelling. Patient stated that she went to the colorectal surgeon last week Monday for hemorrhoids and has follow up with them. \par \par care plan reviewed\par meds reviewed/ renewed\par labs obtained\par rx Augmentin\par \par RTC 3 months\par \par

## 2023-04-10 NOTE — PHYSICAL EXAM
[No Acute Distress] : no acute distress [Well Nourished] : well nourished [Well Developed] : well developed [Well-Appearing] : well-appearing [Normal Sclera/Conjunctiva] : normal sclera/conjunctiva [PERRL] : pupils equal round and reactive to light [EOMI] : extraocular movements intact [Normal Oropharynx] : the oropharynx was normal [Normal Outer Ear/Nose] : the outer ears and nose were normal in appearance [No JVD] : no jugular venous distention [No Lymphadenopathy] : no lymphadenopathy [Supple] : supple [Thyroid Normal, No Nodules] : the thyroid was normal and there were no nodules present [No Respiratory Distress] : no respiratory distress  [No Accessory Muscle Use] : no accessory muscle use [Clear to Auscultation] : lungs were clear to auscultation bilaterally [Normal Rate] : normal rate  [Regular Rhythm] : with a regular rhythm [Normal S1, S2] : normal S1 and S2 [No Murmur] : no murmur heard [No Carotid Bruits] : no carotid bruits [No Abdominal Bruit] : a ~M bruit was not heard ~T in the abdomen [No Varicosities] : no varicosities [Pedal Pulses Present] : the pedal pulses are present [No Edema] : there was no peripheral edema [No Palpable Aorta] : no palpable aorta [No Extremity Clubbing/Cyanosis] : no extremity clubbing/cyanosis [Soft] : abdomen soft [Non Tender] : non-tender [Non-distended] : non-distended [No Masses] : no abdominal mass palpated [No HSM] : no HSM [Normal Bowel Sounds] : normal bowel sounds [No CVA Tenderness] : no CVA  tenderness [No Spinal Tenderness] : no spinal tenderness [No Joint Swelling] : no joint swelling [Grossly Normal Strength/Tone] : grossly normal strength/tone [No Rash] : no rash [Coordination Grossly Intact] : coordination grossly intact [No Focal Deficits] : no focal deficits [Normal Gait] : normal gait [Deep Tendon Reflexes (DTR)] : deep tendon reflexes were 2+ and symmetric [Normal Affect] : the affect was normal [Normal Insight/Judgement] : insight and judgment were intact

## 2023-04-10 NOTE — REVIEW OF SYSTEMS
[Patient Intake Form Reviewed] : Patient intake form was reviewed [Negative] : Gastrointestinal [Fatigue] : no fatigue [Joint Pain] : no joint pain [Joint Stiffness] : no joint stiffness [Muscle Pain] : no muscle pain [Back Pain] : no back pain [FreeTextEntry2] : Overweight [FreeTextEntry7] : GERD

## 2023-04-10 NOTE — HISTORY OF PRESENT ILLNESS
[FreeTextEntry1] : follow up [de-identified] : 62 year old female with hx blood clot presents for follow up. Patient states that she has been having lower abdominal pain, abnormal BM, left sided pain and swelling. Patient stated that she went to the colorectal surgeon last week Monday for hemorrhoids and has follow up with them.

## 2023-04-11 LAB
ALBUMIN SERPL ELPH-MCNC: 4.5 G/DL
ALP BLD-CCNC: 94 U/L
ALT SERPL-CCNC: 18 U/L
ANION GAP SERPL CALC-SCNC: 11 MMOL/L
APPEARANCE: CLEAR
AST SERPL-CCNC: 22 U/L
BASOPHILS # BLD AUTO: 0.06 K/UL
BASOPHILS NFR BLD AUTO: 0.9 %
BILIRUB SERPL-MCNC: <0.2 MG/DL
BILIRUBIN URINE: NEGATIVE
BLOOD URINE: NEGATIVE
BUN SERPL-MCNC: 14 MG/DL
CALCIUM SERPL-MCNC: 10 MG/DL
CD16+CD56+ CELLS # BLD: 325 CELLS/UL
CD16+CD56+ CELLS NFR BLD: 12 %
CD19 CELLS NFR BLD: 322 CELLS/UL
CD3 CELLS # BLD: 1983 CELLS/UL
CD3 CELLS NFR BLD: 74 %
CD3+CD4+ CELLS # BLD: 1202 CELLS/UL
CD3+CD4+ CELLS NFR BLD: 45 %
CD3+CD4+ CELLS/CD3+CD8+ CLL SPEC: 1.49 RATIO
CD3+CD8+ CELLS # SPEC: 808 CELLS/UL
CD3+CD8+ CELLS NFR BLD: 30 %
CELLS.CD3-CD19+/CELLS IN BLOOD: 12 %
CHLORIDE SERPL-SCNC: 105 MMOL/L
CO2 SERPL-SCNC: 26 MMOL/L
COLOR: NORMAL
CREAT SERPL-MCNC: 1.07 MG/DL
EGFR: 58 ML/MIN/1.73M2
EOSINOPHIL # BLD AUTO: 0.29 K/UL
EOSINOPHIL NFR BLD AUTO: 4.2 %
ERYTHROCYTE [SEDIMENTATION RATE] IN BLOOD BY WESTERGREN METHOD: 4 MM/HR
GLUCOSE QUALITATIVE U: NEGATIVE
GLUCOSE SERPL-MCNC: 91 MG/DL
HCT VFR BLD CALC: 43.3 %
HGB BLD-MCNC: 13.8 G/DL
IMM GRANULOCYTES NFR BLD AUTO: 0.1 %
KETONES URINE: NEGATIVE
LEUKOCYTE ESTERASE URINE: ABNORMAL
LYMPHOCYTES # BLD AUTO: 2.76 K/UL
LYMPHOCYTES NFR BLD AUTO: 39.7 %
MAN DIFF?: NORMAL
MCHC RBC-ENTMCNC: 30.2 PG
MCHC RBC-ENTMCNC: 31.9 GM/DL
MCV RBC AUTO: 94.7 FL
MONOCYTES # BLD AUTO: 0.38 K/UL
MONOCYTES NFR BLD AUTO: 5.5 %
NEUTROPHILS # BLD AUTO: 3.45 K/UL
NEUTROPHILS NFR BLD AUTO: 49.6 %
NITRITE URINE: NEGATIVE
PH URINE: 6
PLATELET # BLD AUTO: 311 K/UL
POTASSIUM SERPL-SCNC: 4.4 MMOL/L
PROT SERPL-MCNC: 6.9 G/DL
PROTEIN URINE: NEGATIVE
RBC # BLD: 4.57 M/UL
RBC # FLD: 13.4 %
SODIUM SERPL-SCNC: 141 MMOL/L
SPECIFIC GRAVITY URINE: 1.02
UROBILINOGEN URINE: NORMAL
WBC # FLD AUTO: 6.95 K/UL

## 2023-04-13 ENCOUNTER — APPOINTMENT (OUTPATIENT)
Dept: FAMILY MEDICINE | Facility: CLINIC | Age: 63
End: 2023-04-13

## 2023-04-18 ENCOUNTER — APPOINTMENT (OUTPATIENT)
Dept: PULMONOLOGY | Facility: CLINIC | Age: 63
End: 2023-04-18
Payer: MEDICARE

## 2023-04-18 VITALS
SYSTOLIC BLOOD PRESSURE: 130 MMHG | DIASTOLIC BLOOD PRESSURE: 78 MMHG | RESPIRATION RATE: 16 BRPM | OXYGEN SATURATION: 98 % | HEART RATE: 64 BPM

## 2023-04-18 VITALS — HEIGHT: 63 IN | WEIGHT: 150 LBS | BODY MASS INDEX: 26.58 KG/M2

## 2023-04-18 PROCEDURE — 99213 OFFICE O/P EST LOW 20 MIN: CPT | Mod: 25

## 2023-04-18 PROCEDURE — 94010 BREATHING CAPACITY TEST: CPT

## 2023-04-18 PROCEDURE — 85018 HEMOGLOBIN: CPT | Mod: QW

## 2023-04-18 PROCEDURE — 94729 DIFFUSING CAPACITY: CPT

## 2023-04-18 PROCEDURE — 94727 GAS DIL/WSHOT DETER LNG VOL: CPT

## 2023-04-18 NOTE — HISTORY OF PRESENT ILLNESS
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 PFT today showed FEV1/FVC 77%, FEV1 100%, %, FEF 25/75 85%, TLC 91%, DLCOc 84%. Pt's inflammatory panel was negative including SAMANTHA. She continues to have non-specific symptoms, abdominal pain, skin ulcerations, petechiae, oropharyngeal swelling. No worsening SOB or cough.  [TextBox_11] : 0.5 [TextBox_13] : 35 [YearQuit] : 2022

## 2023-04-20 ENCOUNTER — APPOINTMENT (OUTPATIENT)
Dept: ENDOCRINOLOGY | Facility: CLINIC | Age: 63
End: 2023-04-20

## 2023-04-25 ENCOUNTER — NON-APPOINTMENT (OUTPATIENT)
Age: 63
End: 2023-04-25

## 2023-05-03 ENCOUNTER — APPOINTMENT (OUTPATIENT)
Dept: FAMILY MEDICINE | Facility: CLINIC | Age: 63
End: 2023-05-03
Payer: MEDICARE

## 2023-05-03 VITALS
WEIGHT: 149 LBS | OXYGEN SATURATION: 98 % | HEIGHT: 63 IN | BODY MASS INDEX: 26.4 KG/M2 | DIASTOLIC BLOOD PRESSURE: 70 MMHG | TEMPERATURE: 97.9 F | HEART RATE: 81 BPM | RESPIRATION RATE: 16 BRPM | SYSTOLIC BLOOD PRESSURE: 122 MMHG

## 2023-05-03 PROCEDURE — 99214 OFFICE O/P EST MOD 30 MIN: CPT

## 2023-05-03 RX ORDER — FAMOTIDINE 40 MG/1
40 TABLET, FILM COATED ORAL TWICE DAILY
Qty: 180 | Refills: 1 | Status: DISCONTINUED | COMMUNITY
Start: 2022-11-03 | End: 2023-05-03

## 2023-05-03 RX ORDER — AMOXICILLIN AND CLAVULANATE POTASSIUM 875; 125 MG/1; MG/1
875-125 TABLET, COATED ORAL
Qty: 14 | Refills: 0 | Status: DISCONTINUED | COMMUNITY
Start: 2023-04-10 | End: 2023-05-03

## 2023-05-03 RX ORDER — OXYCODONE AND ACETAMINOPHEN 5; 325 MG/1; MG/1
5-325 TABLET ORAL
Qty: 28 | Refills: 0 | Status: DISCONTINUED | COMMUNITY
Start: 2022-02-10 | End: 2023-05-03

## 2023-05-03 RX ORDER — AMOXICILLIN AND CLAVULANATE POTASSIUM 875; 125 MG/1; MG/1
875-125 TABLET, COATED ORAL DAILY
Qty: 7 | Refills: 0 | Status: DISCONTINUED | COMMUNITY
Start: 2023-04-12 | End: 2023-05-03

## 2023-05-03 NOTE — ASSESSMENT
[FreeTextEntry1] : 62 year old female with hx blood clot presents for follow up. Patient states that she has been having lower abdominal pain, abnormal BM, left sided pain and swelling.Patient was seen by derm and pulmonary, and had CT scans, Patient also completed protracted Abx treatment .Patient finds no relief Patient states by ID prior,\par care plan reviewed\par seen by Derm was advised BX at next out break \par Patient advised ID at [facility] \par Follow up with 911 program \par meds reviewed/ renewed\par labs reviewed from Osteopathic  nutritionist\par CRX for xanax \par Extended visit\par RTC 3 months\par \par

## 2023-05-03 NOTE — HEALTH RISK ASSESSMENT
[Yes] : Yes [Monthly or less (1 pt)] : Monthly or less (1 point) [1 or 2 (0 pts)] : 1 or 2 (0 points) [Never (0 pts)] : Never (0 points) [No] : In the past 12 months have you used drugs other than those required for medical reasons? No [No falls in past year] : Patient reported no falls in the past year [0] : 2) Feeling down, depressed, or hopeless: Not at all (0) [PHQ-2 Negative - No further assessment needed] : PHQ-2 Negative - No further assessment needed [Patient/Caregiver not ready to engage] : , patient/caregiver not ready to engage [I will adhere to the patient's wishes.] : I will adhere to the patient's wishes. [Time Spent: ___ minutes] : Time Spent: [unfilled] minutes [Audit-CScore] : 1 [de-identified] : Average [de-identified] : Average [[facility]] : 9 [WPE2Kmhnu] : 0 [AdvancecareDate] : 10/21

## 2023-05-03 NOTE — REVIEW OF SYSTEMS
[Patient Intake Form Reviewed] : Patient intake form was reviewed [Negative] : Heme/Lymph [Fatigue] : no fatigue [Joint Pain] : no joint pain [Joint Stiffness] : no joint stiffness [Muscle Pain] : no muscle pain [Back Pain] : no back pain [FreeTextEntry2] : Overweight [FreeTextEntry7] : GERD

## 2023-05-03 NOTE — HISTORY OF PRESENT ILLNESS
[FreeTextEntry1] : follow up [de-identified] : 62 year old female with hx blood clot presents for follow up. Patient states that she has been having lower abdominal pain, abnormal BM, left sided pain and swelling.Patient was seen by derm and pulmonary, and had CT scans, Patient also completed protracted Abx treatment .Patient finds no relief Patient states by ID prior,

## 2023-05-08 LAB
APPEARANCE: CLEAR
BILIRUBIN URINE: NEGATIVE
BLOOD URINE: NEGATIVE
COLOR: YELLOW
GLUCOSE QUALITATIVE U: NEGATIVE MG/DL
KETONES URINE: NEGATIVE MG/DL
LEUKOCYTE ESTERASE URINE: NEGATIVE
NITRITE URINE: NEGATIVE
PH URINE: 6.5
PROTEIN URINE: NEGATIVE MG/DL
SPECIFIC GRAVITY URINE: 1.01
UROBILINOGEN URINE: 0.2 MG/DL

## 2023-06-06 ENCOUNTER — APPOINTMENT (OUTPATIENT)
Dept: FAMILY MEDICINE | Facility: CLINIC | Age: 63
End: 2023-06-06
Payer: MEDICARE

## 2023-06-06 VITALS
SYSTOLIC BLOOD PRESSURE: 134 MMHG | WEIGHT: 147 LBS | HEART RATE: 68 BPM | HEIGHT: 63 IN | BODY MASS INDEX: 26.05 KG/M2 | TEMPERATURE: 98.1 F | RESPIRATION RATE: 16 BRPM | OXYGEN SATURATION: 98 % | DIASTOLIC BLOOD PRESSURE: 70 MMHG

## 2023-06-06 PROCEDURE — 99214 OFFICE O/P EST MOD 30 MIN: CPT | Mod: 25

## 2023-06-06 PROCEDURE — 36415 COLL VENOUS BLD VENIPUNCTURE: CPT

## 2023-06-09 ENCOUNTER — APPOINTMENT (OUTPATIENT)
Dept: PULMONOLOGY | Facility: CLINIC | Age: 63
End: 2023-06-09
Payer: MEDICARE

## 2023-06-09 VITALS
HEART RATE: 70 BPM | HEIGHT: 63 IN | BODY MASS INDEX: 26.4 KG/M2 | WEIGHT: 149 LBS | RESPIRATION RATE: 16 BRPM | DIASTOLIC BLOOD PRESSURE: 72 MMHG | SYSTOLIC BLOOD PRESSURE: 126 MMHG | OXYGEN SATURATION: 99 %

## 2023-06-09 PROCEDURE — 99214 OFFICE O/P EST MOD 30 MIN: CPT

## 2023-06-09 NOTE — HISTORY OF PRESENT ILLNESS
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 She continues to report numerous skin lesions in neck, buttocks, extremities. She was seen by dermatologist who did biopsy. She denies SOB or cough. She reports fevers, chills. No chest pains.  [TextBox_11] : 0.5 [TextBox_13] : 35 [YearQuit] : 2022

## 2023-06-19 ENCOUNTER — NON-APPOINTMENT (OUTPATIENT)
Age: 63
End: 2023-06-19

## 2023-06-22 ENCOUNTER — NON-APPOINTMENT (OUTPATIENT)
Age: 63
End: 2023-06-22

## 2023-06-26 ENCOUNTER — APPOINTMENT (OUTPATIENT)
Dept: DERMATOLOGY | Facility: CLINIC | Age: 63
End: 2023-06-26

## 2023-06-29 ENCOUNTER — LABORATORY RESULT (OUTPATIENT)
Age: 63
End: 2023-06-29

## 2023-06-29 ENCOUNTER — APPOINTMENT (OUTPATIENT)
Dept: FAMILY MEDICINE | Facility: CLINIC | Age: 63
End: 2023-06-29
Payer: MEDICARE

## 2023-06-29 VITALS
TEMPERATURE: 97 F | DIASTOLIC BLOOD PRESSURE: 70 MMHG | HEIGHT: 63 IN | SYSTOLIC BLOOD PRESSURE: 120 MMHG | HEART RATE: 69 BPM | WEIGHT: 150 LBS | RESPIRATION RATE: 14 BRPM | OXYGEN SATURATION: 98 % | BODY MASS INDEX: 26.58 KG/M2

## 2023-06-29 DIAGNOSIS — Z91.018 ALLERGY TO OTHER FOODS: ICD-10-CM

## 2023-06-29 DIAGNOSIS — I88.9 NONSPECIFIC LYMPHADENITIS, UNSPECIFIED: ICD-10-CM

## 2023-06-29 PROCEDURE — 36415 COLL VENOUS BLD VENIPUNCTURE: CPT

## 2023-06-29 PROCEDURE — 99215 OFFICE O/P EST HI 40 MIN: CPT | Mod: 25

## 2023-06-29 NOTE — ASSESSMENT
[FreeTextEntry1] : 62 year old female with hx blood clot presents for follow up. Pt w complex h/o skin lesions on and off erythematous rash x years recently had lesion bx'd by dermatologist and results showed insect bite. Pt being treated w ivermectin for suspected parasite infection Patient had Eval at [facility] came back Positive for C-Diff was told to get treatment from PCP. Has NEW ID 7/6/23 pending\par Patient requesting Tx\par Records Reviewed Positive C-Diff\par Metronidazole 500 tid given\par Labs for CBC and food allergy \par care plan reviewed\par meds reviewed/ renewed\par labs obtained\par RTC 1 week \par RTC\par

## 2023-06-29 NOTE — HEALTH RISK ASSESSMENT
[Yes] : Yes [Monthly or less (1 pt)] : Monthly or less (1 point) [1 or 2 (0 pts)] : 1 or 2 (0 points) [Never (0 pts)] : Never (0 points) [No] : In the past 12 months have you used drugs other than those required for medical reasons? No [No falls in past year] : Patient reported no falls in the past year [0] : 2) Feeling down, depressed, or hopeless: Not at all (0) [PHQ-2 Negative - No further assessment needed] : PHQ-2 Negative - No further assessment needed [Patient/Caregiver not ready to engage] : , patient/caregiver not ready to engage [I will adhere to the patient's wishes.] : I will adhere to the patient's wishes. [Time Spent: ___ minutes] : Time Spent: [unfilled] minutes [Audit-CScore] : 1 [de-identified] : Average [de-identified] : Average [facility]] : 9 [[id number]] : 0 [AdvancecareDate] : 10/21

## 2023-06-29 NOTE — HISTORY OF PRESENT ILLNESS
[FreeTextEntry1] : follow up [de-identified] : 62 year old female with hx blood clot presents for follow up. Pt w complex h/o skin lesions on and off erythematous rash x years recently had lesion bx'd by dermatologist and results showed insect bite. Pt being treated w ivermectin for suspected parasite infection Patient had Eval at [facility] came back Positive for C-Diff was told to get treatment from PCP. Has NEW ID 7/6/23

## 2023-06-30 LAB
ALBUMIN SERPL ELPH-MCNC: 4.5 G/DL
ALP BLD-CCNC: 96 U/L
ALT SERPL-CCNC: 26 U/L
ANION GAP SERPL CALC-SCNC: 12 MMOL/L
AST SERPL-CCNC: 22 U/L
BILIRUB SERPL-MCNC: 0.2 MG/DL
BUN SERPL-MCNC: 8 MG/DL
CALCIUM SERPL-MCNC: 10.1 MG/DL
CHLORIDE SERPL-SCNC: 107 MMOL/L
CO2 SERPL-SCNC: 24 MMOL/L
CREAT SERPL-MCNC: 0.94 MG/DL
EGFR: 68 ML/MIN/1.73M2
GLUCOSE SERPL-MCNC: 80 MG/DL
POTASSIUM SERPL-SCNC: 4.3 MMOL/L
PROT SERPL-MCNC: 6.7 G/DL
SODIUM SERPL-SCNC: 143 MMOL/L

## 2023-07-03 LAB
APPLE IGE QN: <0.1 KUA/L
BANANA IGE QN: 0.3 KUA/L
BARLEY IGE QN: 0.19 KUA/L
BEEF IGE QN: <0.1 KUA/L
CHERRY IGE QN: <0.1 KUA/L
COCONUT IGE QN: 0
COCONUT IGE QN: <0.1 KUA/L
COW MILK IGE QN: 4.01 KUA/L
CRAB IGE QN: <0.1 KUA/L
DEPRECATED APPLE IGE RAST QL: 0
DEPRECATED BANANA IGE RAST QL: NORMAL
DEPRECATED BARLEY IGE RAST QL: NORMAL
DEPRECATED BEEF IGE RAST QL: 0
DEPRECATED CHERRY IGE RAST QL: 0
DEPRECATED COW MILK IGE RAST QL: 3
DEPRECATED CRAB IGE RAST QL: 0
DEPRECATED EGG WHITE IGE RAST QL: 2
DEPRECATED GOOSEFOOT IGE RAST QL: NORMAL
DEPRECATED KIWIFRUIT IGE RAST QL: <0.1 KUA/L
DEPRECATED MELON IGE RAST QL: 0
DEPRECATED OAT IGE RAST QL: 0
DEPRECATED ORANGE IGE RAST QL: 0
DEPRECATED PEANUT IGE RAST QL: 0
DEPRECATED PORK IGE RAST QL: 0
DEPRECATED RYE IGE RAST QL: 0
DEPRECATED SOYBEAN IGE RAST QL: 0
DEPRECATED STRAWBERRY IGE RAST QL: 0
DEPRECATED TURKEY MEAT IGE RAST QL: NORMAL
DEPRECATED WHEAT IGE RAST QL: 0
EGG WHITE IGE QN: 2.45 KUA/L
GALACTOSE, ALPHA (O215) CONC: <0.1 KUA/L
GALACTOSE-ALPHA-1,3-GALACTOSE IGE: 0
GOOSEFOOT IGE QN: 0.11 KUA/L
KIWIFRUIT IGE QN: 0
MELON IGE QN: <0.1 KUA/L
OAT IGE QN: <0.1 KUA/L
ORANGE IGE QN: <0.1 KUA/L
PEANUT IGE QN: <0.1 KUA/L
PORK IGE QN: <0.1 KUA/L
RYE IGE QN: <0.1 KUA/L
SOYBEAN IGE QN: <0.1 KUA/L
STRAWBERRY IGE QN: <0.1 KUA/L
TOTAL IGE SMQN RAST: 434 KU/L
TURKEY MEAT IGE QN: 0.18 KUA/L
WHEAT IGE QN: <0.1 KUA/L

## 2023-07-05 ENCOUNTER — APPOINTMENT (OUTPATIENT)
Dept: INTERNAL MEDICINE | Facility: CLINIC | Age: 63
End: 2023-07-05

## 2023-07-21 ENCOUNTER — INPATIENT (INPATIENT)
Facility: HOSPITAL | Age: 63
LOS: 2 days | Discharge: HOME | End: 2023-07-24
Attending: HOSPITALIST | Admitting: HOSPITALIST
Payer: MEDICARE

## 2023-07-21 VITALS
DIASTOLIC BLOOD PRESSURE: 71 MMHG | SYSTOLIC BLOOD PRESSURE: 133 MMHG | RESPIRATION RATE: 18 BRPM | TEMPERATURE: 99 F | OXYGEN SATURATION: 100 % | HEART RATE: 65 BPM

## 2023-07-21 DIAGNOSIS — R07.9 CHEST PAIN, UNSPECIFIED: ICD-10-CM

## 2023-07-21 DIAGNOSIS — Z98.890 OTHER SPECIFIED POSTPROCEDURAL STATES: Chronic | ICD-10-CM

## 2023-07-21 DIAGNOSIS — Z87.81 PERSONAL HISTORY OF (HEALED) TRAUMATIC FRACTURE: Chronic | ICD-10-CM

## 2023-07-21 LAB
ALBUMIN SERPL ELPH-MCNC: 4 G/DL — SIGNIFICANT CHANGE UP (ref 3.3–5)
ALP SERPL-CCNC: 83 U/L — SIGNIFICANT CHANGE UP (ref 40–120)
ALT FLD-CCNC: 18 U/L — SIGNIFICANT CHANGE UP (ref 4–33)
ANION GAP SERPL CALC-SCNC: 13 MMOL/L — SIGNIFICANT CHANGE UP (ref 7–14)
APTT BLD: 30.9 SEC — SIGNIFICANT CHANGE UP (ref 27–36.3)
AST SERPL-CCNC: 18 U/L — SIGNIFICANT CHANGE UP (ref 4–32)
BASE EXCESS BLDV CALC-SCNC: 0.3 MMOL/L — SIGNIFICANT CHANGE UP (ref -2–3)
BASOPHILS # BLD AUTO: 0.05 K/UL — SIGNIFICANT CHANGE UP (ref 0–0.2)
BASOPHILS NFR BLD AUTO: 0.8 % — SIGNIFICANT CHANGE UP (ref 0–2)
BILIRUB SERPL-MCNC: <0.2 MG/DL — SIGNIFICANT CHANGE UP (ref 0.2–1.2)
BLOOD GAS VENOUS COMPREHENSIVE RESULT: SIGNIFICANT CHANGE UP
BUN SERPL-MCNC: 9 MG/DL — SIGNIFICANT CHANGE UP (ref 7–23)
CALCIUM SERPL-MCNC: 9.7 MG/DL — SIGNIFICANT CHANGE UP (ref 8.4–10.5)
CHLORIDE BLDV-SCNC: 106 MMOL/L — SIGNIFICANT CHANGE UP (ref 96–108)
CHLORIDE SERPL-SCNC: 108 MMOL/L — HIGH (ref 98–107)
CO2 BLDV-SCNC: 28 MMOL/L — HIGH (ref 22–26)
CO2 SERPL-SCNC: 21 MMOL/L — LOW (ref 22–31)
CREAT ?TM UR-MCNC: 21 MG/DL — SIGNIFICANT CHANGE UP
CREAT SERPL-MCNC: 0.95 MG/DL — SIGNIFICANT CHANGE UP (ref 0.5–1.3)
EGFR: 67 ML/MIN/1.73M2 — SIGNIFICANT CHANGE UP
EOSINOPHIL # BLD AUTO: 0.22 K/UL — SIGNIFICANT CHANGE UP (ref 0–0.5)
EOSINOPHIL NFR BLD AUTO: 3.3 % — SIGNIFICANT CHANGE UP (ref 0–6)
FIBRINOGEN PPP-MCNC: 357 MG/DL — SIGNIFICANT CHANGE UP (ref 200–465)
GAS PNL BLDV: 140 MMOL/L — SIGNIFICANT CHANGE UP (ref 136–145)
GLUCOSE BLDV-MCNC: 120 MG/DL — HIGH (ref 70–99)
GLUCOSE SERPL-MCNC: 129 MG/DL — HIGH (ref 70–99)
HCO3 BLDV-SCNC: 26 MMOL/L — SIGNIFICANT CHANGE UP (ref 22–29)
HCT VFR BLD CALC: 38.5 % — SIGNIFICANT CHANGE UP (ref 34.5–45)
HCT VFR BLDA CALC: 40 % — SIGNIFICANT CHANGE UP (ref 34.5–46.5)
HGB BLD CALC-MCNC: 13.3 G/DL — SIGNIFICANT CHANGE UP (ref 11.7–16.1)
HGB BLD-MCNC: 12.7 G/DL — SIGNIFICANT CHANGE UP (ref 11.5–15.5)
IANC: 3.8 K/UL — SIGNIFICANT CHANGE UP (ref 1.8–7.4)
IMM GRANULOCYTES NFR BLD AUTO: 0.2 % — SIGNIFICANT CHANGE UP (ref 0–0.9)
INR BLD: 1.38 RATIO — HIGH (ref 0.88–1.16)
LACTATE BLDV-MCNC: 1.2 MMOL/L — SIGNIFICANT CHANGE UP (ref 0.5–2)
LYMPHOCYTES # BLD AUTO: 2.18 K/UL — SIGNIFICANT CHANGE UP (ref 1–3.3)
LYMPHOCYTES # BLD AUTO: 32.9 % — SIGNIFICANT CHANGE UP (ref 13–44)
MCHC RBC-ENTMCNC: 30.5 PG — SIGNIFICANT CHANGE UP (ref 27–34)
MCHC RBC-ENTMCNC: 33 GM/DL — SIGNIFICANT CHANGE UP (ref 32–36)
MCV RBC AUTO: 92.3 FL — SIGNIFICANT CHANGE UP (ref 80–100)
MONOCYTES # BLD AUTO: 0.36 K/UL — SIGNIFICANT CHANGE UP (ref 0–0.9)
MONOCYTES NFR BLD AUTO: 5.4 % — SIGNIFICANT CHANGE UP (ref 2–14)
NEUTROPHILS # BLD AUTO: 3.8 K/UL — SIGNIFICANT CHANGE UP (ref 1.8–7.4)
NEUTROPHILS NFR BLD AUTO: 57.4 % — SIGNIFICANT CHANGE UP (ref 43–77)
NRBC # BLD: 0 /100 WBCS — SIGNIFICANT CHANGE UP (ref 0–0)
NRBC # FLD: 0 K/UL — SIGNIFICANT CHANGE UP (ref 0–0)
NT-PROBNP SERPL-SCNC: 59 PG/ML — SIGNIFICANT CHANGE UP
PCO2 BLDV: 48 MMHG — SIGNIFICANT CHANGE UP (ref 39–52)
PH BLDV: 7.35 — SIGNIFICANT CHANGE UP (ref 7.32–7.43)
PLATELET # BLD AUTO: 281 K/UL — SIGNIFICANT CHANGE UP (ref 150–400)
PO2 BLDV: 37 MMHG — SIGNIFICANT CHANGE UP (ref 25–45)
POTASSIUM BLDV-SCNC: 3.6 MMOL/L — SIGNIFICANT CHANGE UP (ref 3.5–5.1)
POTASSIUM SERPL-MCNC: 3.9 MMOL/L — SIGNIFICANT CHANGE UP (ref 3.5–5.3)
POTASSIUM SERPL-SCNC: 3.9 MMOL/L — SIGNIFICANT CHANGE UP (ref 3.5–5.3)
PROT ?TM UR-MCNC: <4 MG/DL — SIGNIFICANT CHANGE UP
PROT SERPL-MCNC: 6.5 G/DL — SIGNIFICANT CHANGE UP (ref 6–8.3)
PROT/CREAT UR-RTO: SIGNIFICANT CHANGE UP RATIO (ref 0–0.2)
PROTHROM AB SERPL-ACNC: 16.1 SEC — HIGH (ref 10.5–13.4)
RBC # BLD: 4.17 M/UL — SIGNIFICANT CHANGE UP (ref 3.8–5.2)
RBC # FLD: 13.2 % — SIGNIFICANT CHANGE UP (ref 10.3–14.5)
SAO2 % BLDV: 62.1 % — LOW (ref 67–88)
SODIUM SERPL-SCNC: 142 MMOL/L — SIGNIFICANT CHANGE UP (ref 135–145)
TROPONIN T, HIGH SENSITIVITY RESULT: 7 NG/L — SIGNIFICANT CHANGE UP
TROPONIN T, HIGH SENSITIVITY RESULT: 7 NG/L — SIGNIFICANT CHANGE UP
WBC # BLD: 6.62 K/UL — SIGNIFICANT CHANGE UP (ref 3.8–10.5)
WBC # FLD AUTO: 6.62 K/UL — SIGNIFICANT CHANGE UP (ref 3.8–10.5)

## 2023-07-21 PROCEDURE — 71046 X-RAY EXAM CHEST 2 VIEWS: CPT | Mod: 26

## 2023-07-21 PROCEDURE — G1004: CPT

## 2023-07-21 PROCEDURE — 99285 EMERGENCY DEPT VISIT HI MDM: CPT | Mod: GC

## 2023-07-21 PROCEDURE — 71275 CT ANGIOGRAPHY CHEST: CPT | Mod: 26,ME

## 2023-07-21 PROCEDURE — 93970 EXTREMITY STUDY: CPT | Mod: 26

## 2023-07-21 RX ORDER — KETOROLAC TROMETHAMINE 30 MG/ML
30 SYRINGE (ML) INJECTION ONCE
Refills: 0 | Status: DISCONTINUED | OUTPATIENT
Start: 2023-07-21 | End: 2023-07-21

## 2023-07-21 RX ORDER — CYCLOBENZAPRINE HYDROCHLORIDE 10 MG/1
10 TABLET, FILM COATED ORAL ONCE
Refills: 0 | Status: COMPLETED | OUTPATIENT
Start: 2023-07-21 | End: 2023-07-21

## 2023-07-21 RX ADMIN — CYCLOBENZAPRINE HYDROCHLORIDE 10 MILLIGRAM(S): 10 TABLET, FILM COATED ORAL at 20:58

## 2023-07-21 RX ADMIN — Medication 30 MILLIGRAM(S): at 20:58

## 2023-07-21 NOTE — ED PROVIDER NOTE - CLINICAL SUMMARY MEDICAL DECISION MAKING FREE TEXT BOX
63 y/o female w/ PMhx C. diff (recent abx course 1.5 weeks ago) aortic thrombus on eliquis 5mg qD (compliant), anxiety, recent admission for anaphylaxis c/o 2-day history of left-sided worsening progressive chest pain with radiation to the neck and associated shortness of breath. PT stated that she has had clots 2/2 being bitten by a sea anemone. Pt was sent in by Bessie Kapoor DO ([phone]) for bloodwork related to her symptoms and her coagulopathy. Pt admits to chronic diarrhea. Pt is otherwise asymptomatic. Denies fevers, chills, nausea, vomiting, dizziness, abdominal pain, dysuria, hematuria. Will obtain bloodwork as per PCP request. Will screen for ACS (troponin), BNP, anemia/electrolytes, and chest x ray to screen for cardiopulmonary pathology. Likely admit for cardiac workup 2/2 high HEART score.

## 2023-07-21 NOTE — ED PROVIDER NOTE - ATTENDING CONTRIBUTION TO CARE
Ray, DO: 62F h/o aortic thrombus on Eliquis c/b LLE requiring fasciotomy in 2020, TIA both of which pt attributes to sea anemone "bite" here with CP and swelling x 2 days. Worse at rest. No associated n/v/sweats/SOB, not exertional. Does report her L thigh is slightly more swollen than the R. Reports ongoing intermittent episodes of swelling throughout various body parts over past 3 years since having initial aortic thrombus. Unclear etiology based on past medical work-ups, pt reports being admitted to [facility] recently for "anaphylaxis/sepsis" 2/2 to this sea anemone "venom." Pt intermittently on Ivermectin which states helps controls her episodes. Pt sent in by her PCP who included extensive list of labs she is requesting.    Pt is non-toxic appearing, no appreciable swelling on exam, CTAB in no respiratory distress. Compartments soft. 2+ distal pulses    Broad ddx including clotting disorder, septic emboli, malignancy, impaired lymphatic drainage, PE, DVT. Plan for labs, CTPA, anticipate inpatient w/u

## 2023-07-21 NOTE — ED ADULT TRIAGE NOTE - CHIEF COMPLAINT QUOTE
Patient sent by PCP.  Patient c/o right sided chest pain and difficulty breathing x 2 days.  Denies nausea, vomiting, dizziness.  Breathing non-labored.  PHX- Faciotomy, C. Diff, HLD, DVT.  Patient appears stable in triage.  Patient also endorses multiple medical complaints.

## 2023-07-21 NOTE — ED PROVIDER NOTE - CARE PLAN
Principal Discharge DX:	Chest pain   1 Principal Discharge DX:	Chest pain  Secondary Diagnosis:	Dyspnea

## 2023-07-21 NOTE — ED PROVIDER NOTE - OBJECTIVE STATEMENT
63 y/o female w/ PMhx C. diff (recent abx course 1.5 weeks ago) aortic thrombus on eliquis 5mg qD (compliant), anxiety, recent admission for anaphylaxis c/o 2-day history of left-sided worsening progressive chest pain with radiation to the neck and associated shortness of breath. PT stated that she has had clots 2/2 being bitten by a sea anemone. Pt was sent in by Bessie Kapoor DO ([phone]) for bloodwork related to her symptoms and her coagulopathy. Pt admits to chronic diarrhea. Pt is otherwise asymptomatic. Denies fevers, chills, nausea, vomiting, dizziness, abdominal pain, dysuria, hematuria.

## 2023-07-21 NOTE — ED ADULT NURSE NOTE - OBJECTIVE STATEMENT
pt received to intake #9 with c/o chest tightness radiating to the neck feeling like shes suffocating. pt holding throat while speaking, states it helps with the feeling of suffocating. rr even and unlabored, ot on room air. no stridor or wheezing noted. pt speaking in full clear sentences, tolerating secretions, no drooling noted. pt aox4. also states she feels her pallet is purple in color and believes that occurs when she is sick, and turns back to pink when her symptoms have resolved. pt aox4, ambulatory with bathroom with steady gait. IV placed, labs drawn and sent. pending attending juanial.

## 2023-07-22 DIAGNOSIS — R91.1 SOLITARY PULMONARY NODULE: ICD-10-CM

## 2023-07-22 DIAGNOSIS — R07.9 CHEST PAIN, UNSPECIFIED: ICD-10-CM

## 2023-07-22 DIAGNOSIS — R06.09 OTHER FORMS OF DYSPNEA: ICD-10-CM

## 2023-07-22 DIAGNOSIS — F22 DELUSIONAL DISORDERS: ICD-10-CM

## 2023-07-22 DIAGNOSIS — Z29.9 ENCOUNTER FOR PROPHYLACTIC MEASURES, UNSPECIFIED: ICD-10-CM

## 2023-07-22 DIAGNOSIS — I74.10 EMBOLISM AND THROMBOSIS OF UNSPECIFIED PARTS OF AORTA: ICD-10-CM

## 2023-07-22 DIAGNOSIS — F41.9 ANXIETY DISORDER, UNSPECIFIED: ICD-10-CM

## 2023-07-22 LAB — CARDIOLIPIN AB SER-ACNC: NEGATIVE — SIGNIFICANT CHANGE UP

## 2023-07-22 PROCEDURE — 99223 1ST HOSP IP/OBS HIGH 75: CPT

## 2023-07-22 RX ORDER — IVERMECTIN 3 MG/1
3 TABLET ORAL DAILY
Refills: 0 | Status: DISCONTINUED | OUTPATIENT
Start: 2023-07-22 | End: 2023-07-24

## 2023-07-22 RX ORDER — KETOROLAC TROMETHAMINE 30 MG/ML
30 SYRINGE (ML) INJECTION ONCE
Refills: 0 | Status: DISCONTINUED | OUTPATIENT
Start: 2023-07-22 | End: 2023-07-22

## 2023-07-22 RX ORDER — ATORVASTATIN CALCIUM 80 MG/1
40 TABLET, FILM COATED ORAL AT BEDTIME
Refills: 0 | Status: DISCONTINUED | OUTPATIENT
Start: 2023-07-22 | End: 2023-07-24

## 2023-07-22 RX ORDER — ASPIRIN/CALCIUM CARB/MAGNESIUM 324 MG
0 TABLET ORAL
Qty: 0 | Refills: 0 | DISCHARGE

## 2023-07-22 RX ORDER — IVERMECTIN 3 MG/1
3 TABLET ORAL DAILY
Refills: 0 | Status: DISCONTINUED | OUTPATIENT
Start: 2023-07-22 | End: 2023-07-22

## 2023-07-22 RX ORDER — ALPRAZOLAM 0.25 MG
1 TABLET ORAL
Refills: 0 | Status: DISCONTINUED | OUTPATIENT
Start: 2023-07-22 | End: 2023-07-24

## 2023-07-22 RX ORDER — APIXABAN 2.5 MG/1
5 TABLET, FILM COATED ORAL EVERY 12 HOURS
Refills: 0 | Status: DISCONTINUED | OUTPATIENT
Start: 2023-07-22 | End: 2023-07-24

## 2023-07-22 RX ORDER — GABAPENTIN 400 MG/1
0 CAPSULE ORAL
Qty: 0 | Refills: 0 | DISCHARGE

## 2023-07-22 RX ORDER — LANOLIN ALCOHOL/MO/W.PET/CERES
3 CREAM (GRAM) TOPICAL AT BEDTIME
Refills: 0 | Status: DISCONTINUED | OUTPATIENT
Start: 2023-07-22 | End: 2023-07-24

## 2023-07-22 RX ADMIN — ATORVASTATIN CALCIUM 40 MILLIGRAM(S): 80 TABLET, FILM COATED ORAL at 21:15

## 2023-07-22 RX ADMIN — Medication 30 MILLIGRAM(S): at 22:24

## 2023-07-22 RX ADMIN — Medication 1 MILLIGRAM(S): at 02:10

## 2023-07-22 RX ADMIN — IVERMECTIN 3 MILLIGRAM(S): 3 TABLET ORAL at 22:24

## 2023-07-22 RX ADMIN — Medication 30 MILLIGRAM(S): at 12:03

## 2023-07-22 RX ADMIN — APIXABAN 5 MILLIGRAM(S): 2.5 TABLET, FILM COATED ORAL at 21:15

## 2023-07-22 RX ADMIN — Medication 30 MILLIGRAM(S): at 22:54

## 2023-07-22 RX ADMIN — Medication 1 MILLIGRAM(S): at 21:16

## 2023-07-22 RX ADMIN — APIXABAN 5 MILLIGRAM(S): 2.5 TABLET, FILM COATED ORAL at 09:41

## 2023-07-22 RX ADMIN — Medication 30 MILLIGRAM(S): at 12:33

## 2023-07-22 NOTE — H&P ADULT - PROBLEM/PLAN-4
[facility] Note - [place]  Staff: MD Trixie    Referring provider and please cc: self  PC[facility]     Chief Complaint: gross hematuria and frequency    Subjective:        HPI: Antolin Richardson is a 64 y.o. male presents with     OAB, BPH, recurrent uti/prostatitis.   -he initially came to see me 2/27/18 for frequency q20 to 30 minutes, weak stream and sometimes has sprayed stream. He had never been on any meds for his prostate and denied any hesistancy. He states that the sx had occurred since surgery (AAA repair and had catheter during this surgery) in November however CTA shows a very enlarged prostate. 6x5cm in transverse with large intravesical lobe. He also drinks 5 cups of coffee in the morning and 2 cups of tea in the pm. I started him on flomax 0.4mg nightly and he had a uroflow 3/26/18 which showed a mean flow of 6cc with voided volume of 70 and pvr of 17. I did a cysto trus on 4/2/18 which showed a 99g prostate with a large circumferential median lobe. PSA was initially 3.7 and repated it and it came down to 3.0 with 30% free. I referred him to  to discuss robotic simple prostatectomy however he was worried insurance would not cover visit and did not go to appt. He returns today and states that he has burning with urination, oab and denies incomplete emptying. He has had prostatitis/uti at least twice now but has never been on abx for longer than 10 days. Repeated uroflow today but not evnough voided volume (59cc). pvr by in and out cath today: 30cc    AUA ssx today:(5 incomplete emptying, 5 frequency, 5 intermittency, 5 urgency, 5 weak stream, 0 straining, 5 sleeping). 30. QOL: unhappy    Gross hematuria  -he's had blood in his urine intermittently since February which is what brought him to see me as he had never had GH before. The last time was just a few days ago.   -cta 11/2017 shows no stones.   -cytology 2/27/18: negative, +tobacco use  (1.5 ppd x 50 years). On asa 81mg daily. Circumcised. Denies any new frequency that is new or worse, denies any dysuria.  -cystoscopy trus 18: 99g prostate, no tumors.    UA today void (circ): nit+/2+wbc/30 protein/2+blood  ua cath: 3+blood/nitrite +/1+leuk/tr protein - sent for culture and cytology  and will treat 2 weeks prior to procedure (c/o dysuria and frequency), pvr by I&O: 30cc  ua history:   18  No cx, void: 1+leuk and blood   3/31/18            Ng, void: negative  3/26/18            Coag neg staph, void: 2+ wbc, nit+, 50 blood - bactrim twice a day for 10 days  3/19/18            Ng, void: 1+ blooda  18            Multiple org, void: 1+wbc, tr blood (bactrim x 7d), cytology: negative   ua today +, c/o burning    ED (did not discuss today)  -occ problems with erections.   -uses viagra  -IIEF: 13cc    REVIEW OF SYSTEMS:  General ROS: no fevers, no chills  Psychological ROS: no depression  Endocrine ROS: no heat or cold  Respiratory ROS: no SOB  Cardiovascular ROS: no CP  Gastrointestinal ROS: no abdominal pain, no constipation, no diarrhea, no BRBPR  Musculoskeletal ROS: no muscle pain  Neurological ROS: no headaches  Dermatological ROS: no rashes  HEENT: noglasses, no sinus   ROS: per HPI     PMHx:  Past Medical History:   Diagnosis Date    AAA (abdominal aortic aneurysm) 2016    s/p endo repair ([place])    Acid reflux     Anxiety     Coronary artery disease     Hypertension      Kidney stones: No  Cataracts? none    PSHx:  Past Surgical History:   Procedure Laterality Date    ABDOMINAL AORTIC ANEURYSM REPAIR      APPENDECTOMY      CORONARY ANGIOPLASTY      coronary artery stent      x1     Stents/Valves/Foreign Bodies: Yes - last placed in   Cardiac Evaluation: Yes -     Screening Studies  Colonoscopy: 2017 found polyps    Fam Hx:   malignancies: No  . Gyn malignancies: no. Parents  and 80s and mother had brain cancer  kidney stones: No     Soc  Hx:  +tobacco.  1.5 pk per day x 50 year  occ alcohol  Lives in [place]  :   Children: 3  Occupation:retired from security    Allergies:  Lisinopril    Urologic meds: flomax 0.4mg nightly, finasteride  Anticoagulation: Yes - asa 81mg daily     Objective:     Vitals:    06/21/18 1054   BP: (!) 182/99   Pulse: 63   Resp: 18         General:WDWN in NAD  Eyes: PERRLA, normal conjunctiva  Respiratory: No increased work on breathing.   Cardiovascular: No obvious extremity edema. Warm and well perfused.   GI: palpation of masses. No tenderness. No hepatosplenomegaly to palpation.  Musculoskeletal: normal range of motion of bilateral upper extremities. Normal muscle strength and tone.  Skin: no obvious rashes or lesions. No tightening of skin noted.  Neurologic: CN grossly normal. Normal sensation.   Psychiatric: awake, alert and oriented x 3. Mood and affect normal. Cooperative.    :  Penis is circumcised,    Last MONE 2/27/18: 40g gland without masses, tenderness. SV not palpable. Normal sphincter tone. No hemhorroids.      pvr by in and out cath today: 30cc - sent for ua,culture, cystology     LABS REVIEW:    Cr:   Lab Results   Component Value Date    CREATININE 1.3 11/20/2017    CREATININE 1.3 11/20/2017       PSA:   No results found for: PSA  Testosterone: No results found for: TESTOSTERONE    PATHOLOGY REVIEW:  Urine 2/27/17: Negative for malignant cells. Will discuss at f/u    RADIOGRAPHIC REVIEW:  cta 11/20/17 images reviewed by me  -no stones  -enlarged prostate    Interval placement of aortobiiliac endovascular stent graft which is patent with no extravasation and with partial collapse of the native surrounding aneurysm sac.    Additional findings as detailed above including enlarged prostate gland, diverticulosis without CT findings of acute diverticulitis, left renal masses suggesting low density and high density cysts.    Assessment:       1. Hematuria, unspecified type          Plan:      BPH with LuTS and recurrent prostatitis/uti  -because he has recurrent uti/prostatitis and a very enlarged prostate, although he empties well, I recommend surgical treatment for his bph.   -continue flomax 0.4mg nightly and finasteride for now but will plan to d/c 1 month after TUR  -we reviewed robot simple prostatectomy vs TUR or staged TUR and after discussion the patient wants to proceed with TUR, possibly staged TUR on July 18.   -we need clearance to be off asa with recent AAA repair ()  -we went over the risks and benefits of tur including intermittent bleeding or persistent infection, finding prostate cancer   - Also explained bc he has never been treated for more than 10 days I want to start him on abx 14 days prior to his turp and continue it for another 2 weeks after. If his culture is sterile/negative then I will go ahead and start him on cipro 500 bid x 4 weeks, 2 weeks prior to the TUR.   -cath urine 1 week prior to TUR  -cbc and bmp ordered for pre-op  -he was given info on TUR today     Gross hematuria  -already had a cta in 11/2017  -urine for culture  (no sx, frequency not new   -urine cytology (h/o smoking) 2/2018 negative, but will repeat today  -cysto trus was negative 4/2018 but will plan to do retrograde pyelogram and another cysto at time of TUR.     F/u for TURP on July 18 as long as has been cleared by  to be off asa for 7 days prior and has been on abx for 2 weeks (cipro)      Poly Marcum MD   DISPLAY PLAN FREE TEXT

## 2023-07-22 NOTE — H&P ADULT - NSHPPHYSICALEXAM_GEN_ALL_CORE
Vital Signs Last 24 Hrs  T(C): 36.4 (21 Jul 2023 23:00), Max: 37.1 (21 Jul 2023 12:51)  T(F): 97.6 (21 Jul 2023 23:00), Max: 98.8 (21 Jul 2023 12:51)  HR: 80 (21 Jul 2023 23:00) (60 - 80)  BP: 151/73 (21 Jul 2023 23:00) (108/71 - 159/80)  BP(mean): --  RR: 18 (21 Jul 2023 23:00) (14 - 18)  SpO2: 96% (21 Jul 2023 23:00) (96% - 100%)    Parameters below as of 21 Jul 2023 23:00  Patient On (Oxygen Delivery Method): room air

## 2023-07-22 NOTE — H&P ADULT - PROBLEM SELECTOR PLAN 4
c/w xanax, reduced to PRN BID CTA chest:  In the left upper lobe is a 4 mm noncalcified nodule.    -f/u with PCP regarding CT chest

## 2023-07-22 NOTE — H&P ADULT - NSHPLABSRESULTS_GEN_ALL_CORE
.    -Personally interpreted EKG:     -Personally reviewed labs below:    14:23 - VBG - pH: 7.35  | pCO2: 48    | pO2: 37    | Lactate: 1.2                          12.7   6.62  )-----------( 281      ( 21 Jul 2023 14:23 )             38.5   07-21    142  |  108<H>  |  9   ----------------------------<  129<H>  3.9   |  21<L>  |  0.95    Ca    9.7      21 Jul 2023 14:23    TPro  6.5  /  Alb  4.0  /  TBili  <0.2  /  DBili  x   /  AST  18  /  ALT  18  /  AlkPhos  83  07-21        -[facility]       PROCEDURE DATE:  07/21/2023    PLEURA: No pleural effusion. No pneumothorax.  MEDIASTINUM AND VINCENT: No lymphadenopathy.  VESSELS: No main, lobar, segmental, or subsegmental pulmonary embolism.   Aortic and coronary artery calcifications.  The aorta is normal in caliber. Aortic calcifications.  HEART: Heart size is normal. No pericardial effusion.  CHEST WALL AND LOWER NECK: Within normal limits.  VISUALIZED UPPER ABDOMEN: The adrenal glands are thickened.  BONES: Partially visualized cervical spinal fusion hardware. Degenerative   changes of the thoracic spine.  IMPRESSION:  1.  No pulmonary embolism.  2.  In the left upper lobe is a 4 mm noncalcified nodule. .    -Personally interpreted EKG: NSR, 70bpm, qtc 429, no acute tw or st changes, no pacs or pvcs     -Personally reviewed labs below:    14:23 - VBG - pH: 7.35  | pCO2: 48    | pO2: 37    | Lactate: 1.2                          12.7   6.62  )-----------( 281      ( 21 Jul 2023 14:23 )             38.5   07-21    142  |  108<H>  |  9   ----------------------------<  129<H>  3.9   |  21<L>  |  0.95    Ca    9.7      21 Jul 2023 14:23    TPro  6.5  /  Alb  4.0  /  TBili  <0.2  /  DBili  x   /  AST  18  /  ALT  18  /  AlkPhos  83  07-21        -[facility]       PROCEDURE DATE:  07/21/2023    PLEURA: No pleural effusion. No pneumothorax.  MEDIASTINUM AND VINCENT: No lymphadenopathy.  VESSELS: No main, lobar, segmental, or subsegmental pulmonary embolism.   Aortic and coronary artery calcifications.  The aorta is normal in caliber. Aortic calcifications.  HEART: Heart size is normal. No pericardial effusion.  CHEST WALL AND LOWER NECK: Within normal limits.  VISUALIZED UPPER ABDOMEN: The adrenal glands are thickened.  BONES: Partially visualized cervical spinal fusion hardware. Degenerative   changes of the thoracic spine.  IMPRESSION:  1.  No pulmonary embolism.  2.  In the left upper lobe is a 4 mm noncalcified nodule. Cardiac/Digestive

## 2023-07-22 NOTE — H&P ADULT - HISTORY OF PRESENT ILLNESS
63 y/o female w/ MHx of C. diff (recent abx course 1.5 weeks ago) aortic thrombus (on Eliquis), anxiety, recent admission for anaphylaxis c/o 2-day history of left-sided worsening progressive chest pain with radiation to the neck and associated shortness of breath. States that she has had clots 2/2 being stung by a sea anemone. Pt was referred by PCP  for blood work related to her symptoms and coagulopathy. Pt reports chronic diarrhea. Otherwise does not report fevers, chills, nausea, vomiting, dizziness, abdominal pain, dysuria, hematuria.     61 y/o female w/ MHx of C. diff (recent abx course 1.5 weeks ago) aortic thrombus (on Eliquis), anxiety, recent admission for anaphylaxis due to idiopathic angioedema c/o 2-day history of left-sided worsening progressive chest pain with radiation to the neck and associated shortness of breath. States that she has had clots 2/2 being stung by a sea anemone. Pt was referred by PCP  for blood work related to her symptoms and coagulopathy. Pt reports chronic diarrhea. Otherwise does not report fevers, chills, nausea, vomiting, dizziness, abdominal pain, dysuria, hematuria.     61 y/o female w/ MHx of C. diff (recent abx course 1.5 weeks ago) aortic thrombus (on Eliquis), anxiety, recent admission for anaphylaxis due to idiopathic angioedema c/o weeks of left-sided worsening progressive chest pain with radiation to the neck and associated shortness of breath. Symptoms occur frequently over the course of day and are 6/10 in intensity with no specific alleviating or exacerbating factors. Not associated with LOC or palpitations. States that had stress test over 5 years ago, which was reportedly normal.  Pt was referred by her PCP, Dr. Bessie Hernandez DO, to be evaluated for SOB and CP    In addition the pt states that she has had clots 2/2 being stung by a sea anemone last year. Pt believes that has poison or venom circulating throughout her body causing multiple medical problems, which occurred after the encounter with a sea anemone including: legs swelling, blueing of toes, c diff, dryness of her mouth, TIA, blood clots, swelling of various part of body . Also believes that has parasitic infection that is also contributing to her symptoms and only Ivermectin is helping her specifically with swelling of her legs. However, the pt cannot explain what kind of paracytic infection she has. 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 Pt states that has appointments for August with GI, Neuro-surgery, Vascular surgery and ID;     Pt was referred by PCP  for blood work related to her symptoms and coagulopathy. Pt reports chronic diarrhea. Otherwise does not report fevers, chills, nausea, vomiting, dizziness, abdominal pain, dysuria, hematuria.

## 2023-07-22 NOTE — H&P ADULT - ASSESSMENT
63 y/o female w/ MHx of C. diff (recent abx course 1.5 weeks ago) aortic thrombus (on Eliquis), anxiety, recent admission for anaphylaxis  a/w CP and CASILLAS 63 y/o female w/ MHx of C. diff (recent abx course 1.5 weeks ago) aortic thrombus (on Eliquis), anxiety, recent admission for anaphylaxis  a/w CP and CASILLAS; Also c/f delusional parasitosis;

## 2023-07-22 NOTE — CONSULT NOTE ADULT - ASSESSMENT
{\rtf1\npbage60377\ansi\zgtsuos2912\ftnbj\uc1\deff0  {\fonttbl{\f0 \fnil Segoe UI;}{\f1 \fnil \fcharset0 Segoe UI;}{\f2 \fnil Times New Mc;}}  {\colortbl ;\sdt797\wqfek125\nksw780 ;\red0\green0\blue0 ;\red0\green0\ddks514 ;\red0\green0\blue0 ;}  {\stylesheet{\f0\fs20 Normal;}{\cs1 Default Paragraph Font;}{\cs2\f0\fs16 Line Number;}{\cs3\f2\fs24\ul\cf3 Hyperlink;}}  {\*\revtbl{Unknown;}}  \wxbnwj22006\gzfgnj68380\unzyd2859\vtgfb8232\ggcfx5103\kvuhb2058\vgcpznb287\kshggyt754\nogrowautofit\nlsibj451\formshade\nofeaturethrottle1\dntblnsbdb\fet4\aendnotes\aftnnrlc\pgbrdrhead\pgbrdrfoot  \sectd\dzonos23502\rhcvpf30564\guttersxn0\vkzqzxeh3960\ejvxhqce7049\lznexnuu3824\zumyplgn5946\lpfmbda615\jnswqof704\sbkpage\pgncont\pgndec  \plain\plain\f0\fs24\ql\plain\f0\fs24\plain\f0\fs20\pmta8820\hich\f0\dbch\f0\loch\f0\fs20 A/P\par  \par  61 y/o female w/ MHx of C. diff (recent abx course 1.5 weeks ago) aortic thrombus (on Eliquis), anxiety, recent admission for anaphylaxis  a/w CP and CASILLAS; Also c/f delusional parasitosis; \par  \par  \plain\f1\fs20\rftf0944\hich\f1\dbch\f1\loch\f1\cf2\fs20\strike\plain\f1\fs20\lhmt7592\hich\f1\dbch\f1\loch\f1\cf2\fs20\plain\f0\fs20\rwdz9670\hich\f0\dbch\f0\loch\f0\fs20 #{\*\bkmkstart rv45553050201}{\*\bkmkend nu48859104370}{\*\bkmkstart fm58011372826}{\*\bkmkend   va50468867214}Chest pain. \par  -atypical symptoms, but recurrent \par  -no acute ischemic ecg abnl \par  -check echo \par  -plan for ischemic eval with nuclear stress - pharm, (ankle pain)\par  \par  #{\*\bkmkstart uh43333503614}{\*\bkmkend gp64690771684}{\*\bkmkstart ww38676938311}{\*\bkmkend fr40075225542}Dyspnea on exertion. \par  -no clinical HF, volume overload\par  -cta no pe, effusion, etiology elucidated\par  -f/u echo\par  -await stress testing \par  {\*\bkmkstart io24561033386}{\*\bkmkend la30398319159}{\*\bkmkstart sg29159496933}{\*\bkmkend il11893336035}\par  #history of {\*\bkmkstart ye66649768750}{\*\bkmkend gc83813339702}{\*\bkmkstart ii36167648002}{\*\bkmkend mt81288241409}Aortic thrombus. \par  -cta w/o evidence of sig aortic plaque \par  -continue {\*\bkmkstart px04186680688}{\*\bkmkend no32573647991}{\*\bkmkstart hc33653882958}{\*\bkmkend ol08980476738}Eliquis BID for now \par  \par  #{\*\bkmkstart pg98164815147}{\*\bkmkend tr89606644701}{\*\bkmkstart wq57670604726}{\*\bkmkend ys25201006619}Lung nodule.\plain\f1\fs20\pjna4912\hich\f1\dbch\f1\loch\f1\cf2\fs20\strike\plain\f0\fs20\ridt0625\hich\f0\dbch\f0\loch\f0\fs20\par  -outpt f/u for {\*\bkmkstart wh32882423877}{\*\bkmkend xt21905374640}{\*\bkmkstart es92584334944}{\*\bkmkend ku57743970218}4 mm noncalcified nodule.\par  \par  \plain\f1\fs20\owkj8657\hich\f1\dbch\f1\loch\f1\cf2\fs20\strike\plain\f0\fs20\shwi9439\hich\f0\dbch\f0\loch\f0\fs20 #{\*\bkmkstart xt53674932144}{\*\bkmkend kj74415568581}{\*\bkmkstart qr46022762487}{\*\bkmkend pl98247499687}Anxiety.\plain\f1\fs20\wgrs9025\hich\f1\dbch\f1\loch\f1\cf2\fs20\strike\plain\f0\fs20\rxiy3952\hich\f0\dbch\f0\loch\f0\fs20\par  -{\*\bkmkstart fs17378837411}{\*\bkmkend wl59053565647}{\*\bkmkstart vu73903124410}{\*\bkmkend ob35140325209}c/w xanax, med eval \par  \par  \plain\f1\fs20\juqz9995\hich\f1\dbch\f1\loch\f1\cf2\fs20\strike{\*\bkmkstart qx63337012051}{\*\bkmkend en96537419150}{\*\bkmkstart kh89789815447}{\*\bkmkend bd67410610061}{\*\bkmkstart ln56207933157}{\*\bkmkend ow78230320873}{\*\bkmkstart bz89109682933}{\*\bkmkend   tc64201936140}\plain\f0\fs20\voyj9263\hich\f0\dbch\f0\loch\f0\fs20 #{\*\bkmkstart zo70961041761}{\*\bkmkend nw78535733120}{\*\bkmkstart bg08307660662}{\*\bkmkend to56485512781}R/O Delusions of parasitosis. 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 Advanced care planning forms discussed with patient and/or family.  Risks, benefits, and alternatives of medical/cardiac procedures were discussed in detail with all questions answered.  17 minutes were   spent addressing advance care planning, r/b/a of cardiac testing, echo, stress, cTA.  \par  \ql\plain\f0\fs24\plain\f0\fs20\rphq4253\hich\f0\dbch\f0\loch\f0\fs20 78 minutes spent on total encounter; more than 50% of the visit was spent counseling and/or coordinating care by the attending physician.\par  {\*\bkmkstart bkcommentCR}{\*\bkmkend bkcommentCR}\par  }

## 2023-07-22 NOTE — H&P ADULT - NSHPSOCIALHISTORY_GEN_ALL_CORE
June 11, 2020      Eli Burton  [address]  [facility] [address]        Gee Bryant,    We care about your health and have reviewed your health plan and are making recommendations based on this review to optimize your health.    You are in particular need of attention regarding:   Breast Cancer Screening    At this time we are recommending that you:  Schedule your mammogram     Screening Mammogram Scheduling:  [facility] [phone]  [facility] [phone]  [place] [phone]  Central Scheduling for All Locations [phone]  If you are underinsured or uninsured, we recommend you contact [facility].   They offer mammograms on a sliding fee charge.   You can schedule with them at [phone].    Sincerely,  Domenica ELIZALDE RN    
Single  Retired    Ambulatory without assistive devices

## 2023-07-22 NOTE — CHART NOTE - NSCHARTNOTEFT_GEN_A_CORE
Called patients PMD Dr. Kapoor [phone], to clarify medical history. As per Dr. Kapoor pt [place] in fall of last year. Since then pt has presented with an array of symptoms and illnesses including rashes, aortic thrombus, C-diff, swelling of feet and other body parts,, abdominal pain, bloating, fevers, and blue digits. She believes that patient may be suffering from parasitic infection based on results of "Zyto scan" performed in office which may be compounded by recent Lyme infection as well as Long Covid. Pt was treated for Lyme with course of Doxycycline. Pt had extensive rheumatologic w/u in office which did not reveal any evidence of autoimmune disease. Pt was recently referred to hematologist for hypercoag work up and recommended to follow up with GI for further investigation of her abdominal symptoms. Recent blood work in office revealed elevated inflammatory markers including elevated CRP. Pt was initialed on Ivermectin by Dr. Kapoor based on clinical trials showing this to be helpful in some patients with long covid /parasitic infection. Pt does feel her symptoms improved since starting, discussed with Dr. Stoll will continue Ivermectin for now. Called patients PMD Dr. Kapoor [phone], to clarify medical history. As per Dr. Kapoor pt was bit by something on beach back in fall of last year. Since then pt has presented with an array of symptoms and illnesses including rashes, aortic thrombus, C-diff, swelling of feet and other body parts,, abdominal pain, bloating, fevers, and blue digits. She believes that patient may be suffering from parasitic infection based on results of "Zyto scan" performed in office which may be compounded by recent Lyme infection as well as Long Covid. Pt was treated for Lyme with course of Doxycycline. Pt had extensive rheumatologic w/u in office which did not reveal any evidence of autoimmune disease. Pt was recently referred to hematologist for hypercoag work up and recommended to follow up with GI for further investigation of her abdominal symptoms. Recent blood work in office revealed elevated inflammatory markers including elevated CRP. She is aware that pt was given possible diagnosis of delusional parasitosis, however does not feel is likely given pt's extensive medical history. Pt was initialed on Ivermectin by Dr. Kapoor based on clinical trials showing this to be helpful in some patients with long covid /parasitic infection. Pt does feel her symptoms improved since starting, discussed with Dr. Stoll will continue Ivermectin for now.

## 2023-07-22 NOTE — H&P ADULT - PROBLEM SELECTOR PLAN 6
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  The diagnosis was mentioned by the pt as possibly used while admitted to ?[facility];  -Hold Ivermectin until indication confirmed with the PCP  -Obtain detailed collateral from the PCP in AM (primary team) - Dr. Bessie Kapoor DO ([phone])  -Primary attending, Dr. 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  The diagnosis was mentioned by the pt as possibly used while admitted to ?[facility];  -Eosinophiles WNL on CBC with diff   -Hold Ivermectin until indication confirmed with the PCP  -Obtain detailed collateral from the PCP in AM (primary team) - Dr. Bessie Kapoor DO ([phone])  -Primary attending, Dr. Lundberg, to consider psychiatric evaluation pending collateral from the PCP   -No 1:1 monitoring indicated   -Pt has capacity to leave AMA On full a/c - Eliquis

## 2023-07-22 NOTE — H&P ADULT - PROBLEM SELECTOR PLAN 3
I called pt. .    I can get her in for the PEREZ this Wed 12/19  But she would only be off her coumadin 3-4 days  I will check w/ Dr Lizy Cespedes to see if that's ok    I will notify patient c/w Eliquis BID

## 2023-07-22 NOTE — H&P ADULT - PSYCHIATRIC COMMENTS
very anxious,  seems to be preoccupied with parasitic infection and Ivermectin as the only medication that improves all symptoms

## 2023-07-22 NOTE — H&P ADULT - PROBLEM SELECTOR PLAN 1
Unclear etiology; Low suspicion of ACS at this time; Multiple complaints;   -Telemetry   -EKG: no acute ischemia;   -Troponin 7 x2   -TSH , Lipid profile, A1c ordered  -2 D echo heart ordered  -Further ischemic w/up including stress deferred to Dr. Lundberg, cardiology (primary attending)   -EKG PRN chest pain

## 2023-07-22 NOTE — H&P ADULT - PROBLEM SELECTOR PLAN 7
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  The diagnosis was mentioned by the pt as possibly used while admitted to ?[facility];  -Eosinophiles WNL on CBC with diff   -Hold Ivermectin until indication confirmed with the PCP  -Obtain detailed collateral from the PCP in AM (primary team) - Dr. Bessie Kapoor DO ([phone])  -Primary attending, Dr. Lundberg, to consider psychiatric evaluation pending collateral from the PCP   -No 1:1 monitoring indicated   -Pt has capacity to leave AMA

## 2023-07-23 ENCOUNTER — TRANSCRIPTION ENCOUNTER (OUTPATIENT)
Age: 63
End: 2023-07-23

## 2023-07-23 LAB
A1C WITH ESTIMATED AVERAGE GLUCOSE RESULT: 5.6 % — SIGNIFICANT CHANGE UP (ref 4–5.6)
ANION GAP SERPL CALC-SCNC: 6 MMOL/L — LOW (ref 7–14)
BASOPHILS # BLD AUTO: 0.06 K/UL — SIGNIFICANT CHANGE UP (ref 0–0.2)
BASOPHILS NFR BLD AUTO: 1 % — SIGNIFICANT CHANGE UP (ref 0–2)
BUN SERPL-MCNC: 12 MG/DL — SIGNIFICANT CHANGE UP (ref 7–23)
CALCIUM SERPL-MCNC: 9.3 MG/DL — SIGNIFICANT CHANGE UP (ref 8.4–10.5)
CHLORIDE SERPL-SCNC: 110 MMOL/L — HIGH (ref 98–107)
CHOLEST SERPL-MCNC: 123 MG/DL — SIGNIFICANT CHANGE UP
CO2 SERPL-SCNC: 22 MMOL/L — SIGNIFICANT CHANGE UP (ref 22–31)
CREAT SERPL-MCNC: 0.89 MG/DL — SIGNIFICANT CHANGE UP (ref 0.5–1.3)
EGFR: 73 ML/MIN/1.73M2 — SIGNIFICANT CHANGE UP
EOSINOPHIL # BLD AUTO: 0.25 K/UL — SIGNIFICANT CHANGE UP (ref 0–0.5)
EOSINOPHIL NFR BLD AUTO: 4.1 % — SIGNIFICANT CHANGE UP (ref 0–6)
ESTIMATED AVERAGE GLUCOSE: 114 — SIGNIFICANT CHANGE UP
GLUCOSE SERPL-MCNC: 84 MG/DL — SIGNIFICANT CHANGE UP (ref 70–99)
HCT VFR BLD CALC: 41.4 % — SIGNIFICANT CHANGE UP (ref 34.5–45)
HDLC SERPL-MCNC: 36 MG/DL — LOW
HGB BLD-MCNC: 13.4 G/DL — SIGNIFICANT CHANGE UP (ref 11.5–15.5)
IANC: 3.4 K/UL — SIGNIFICANT CHANGE UP (ref 1.8–7.4)
IMM GRANULOCYTES NFR BLD AUTO: 0.3 % — SIGNIFICANT CHANGE UP (ref 0–0.9)
LIPID PNL WITH DIRECT LDL SERPL: 64 MG/DL — SIGNIFICANT CHANGE UP
LYMPHOCYTES # BLD AUTO: 2.04 K/UL — SIGNIFICANT CHANGE UP (ref 1–3.3)
LYMPHOCYTES # BLD AUTO: 33.1 % — SIGNIFICANT CHANGE UP (ref 13–44)
MAGNESIUM SERPL-MCNC: 2 MG/DL — SIGNIFICANT CHANGE UP (ref 1.6–2.6)
MCHC RBC-ENTMCNC: 30.6 PG — SIGNIFICANT CHANGE UP (ref 27–34)
MCHC RBC-ENTMCNC: 32.4 GM/DL — SIGNIFICANT CHANGE UP (ref 32–36)
MCV RBC AUTO: 94.5 FL — SIGNIFICANT CHANGE UP (ref 80–100)
MONOCYTES # BLD AUTO: 0.4 K/UL — SIGNIFICANT CHANGE UP (ref 0–0.9)
MONOCYTES NFR BLD AUTO: 6.5 % — SIGNIFICANT CHANGE UP (ref 2–14)
NEUTROPHILS # BLD AUTO: 3.4 K/UL — SIGNIFICANT CHANGE UP (ref 1.8–7.4)
NEUTROPHILS NFR BLD AUTO: 55 % — SIGNIFICANT CHANGE UP (ref 43–77)
NON HDL CHOLESTEROL: 87 MG/DL — SIGNIFICANT CHANGE UP
NRBC # BLD: 0 /100 WBCS — SIGNIFICANT CHANGE UP (ref 0–0)
NRBC # FLD: 0 K/UL — SIGNIFICANT CHANGE UP (ref 0–0)
NT-PROBNP SERPL-SCNC: 66 PG/ML — SIGNIFICANT CHANGE UP
PHOSPHATE SERPL-MCNC: 3.3 MG/DL — SIGNIFICANT CHANGE UP (ref 2.5–4.5)
PLATELET # BLD AUTO: 255 K/UL — SIGNIFICANT CHANGE UP (ref 150–400)
POTASSIUM SERPL-MCNC: 3.7 MMOL/L — SIGNIFICANT CHANGE UP (ref 3.5–5.3)
POTASSIUM SERPL-SCNC: 3.7 MMOL/L — SIGNIFICANT CHANGE UP (ref 3.5–5.3)
RBC # BLD: 4.38 M/UL — SIGNIFICANT CHANGE UP (ref 3.8–5.2)
RBC # FLD: 13 % — SIGNIFICANT CHANGE UP (ref 10.3–14.5)
SODIUM SERPL-SCNC: 138 MMOL/L — SIGNIFICANT CHANGE UP (ref 135–145)
TRIGL SERPL-MCNC: 115 MG/DL — SIGNIFICANT CHANGE UP
TSH SERPL-MCNC: 1.86 UIU/ML — SIGNIFICANT CHANGE UP (ref 0.27–4.2)
WBC # BLD: 6.17 K/UL — SIGNIFICANT CHANGE UP (ref 3.8–10.5)
WBC # FLD AUTO: 6.17 K/UL — SIGNIFICANT CHANGE UP (ref 3.8–10.5)

## 2023-07-23 PROCEDURE — 93306 TTE W/DOPPLER COMPLETE: CPT | Mod: 26

## 2023-07-23 PROCEDURE — 93016 CV STRESS TEST SUPVJ ONLY: CPT | Mod: GC

## 2023-07-23 PROCEDURE — 93018 CV STRESS TEST I&R ONLY: CPT | Mod: GC

## 2023-07-23 PROCEDURE — 78452 HT MUSCLE IMAGE SPECT MULT: CPT | Mod: 26

## 2023-07-23 RX ORDER — LANOLIN ALCOHOL/MO/W.PET/CERES
1 CREAM (GRAM) TOPICAL
Qty: 0 | Refills: 0 | DISCHARGE
Start: 2023-07-23

## 2023-07-23 RX ORDER — ALPRAZOLAM 0.25 MG
1 TABLET ORAL
Refills: 0 | DISCHARGE
Start: 2023-07-23

## 2023-07-23 RX ADMIN — IVERMECTIN 3 MILLIGRAM(S): 3 TABLET ORAL at 15:27

## 2023-07-23 RX ADMIN — ATORVASTATIN CALCIUM 40 MILLIGRAM(S): 80 TABLET, FILM COATED ORAL at 22:05

## 2023-07-23 RX ADMIN — Medication 1 MILLIGRAM(S): at 22:05

## 2023-07-23 RX ADMIN — APIXABAN 5 MILLIGRAM(S): 2.5 TABLET, FILM COATED ORAL at 15:27

## 2023-07-23 RX ADMIN — APIXABAN 5 MILLIGRAM(S): 2.5 TABLET, FILM COATED ORAL at 22:05

## 2023-07-23 NOTE — DISCHARGE NOTE PROVIDER - HOSPITAL COURSE
61 y/o female w/ PMHx of C. diff (recent abx course 1.5 weeks ago) aortic thrombus (on Eliquis), anxiety, recent admission for anaphylaxis  a/w CP and CASILLAS; Also c/f delusional parasitosis;      Problem/Plan - 1:  ·  Problem: Chest pain.   ·  Plan: Unclear etiology; Low suspicion of ACS at this time; Multiple complaints;   -Telemetry: so far no events.   -EKG: no acute ischemia;   -Troponin 7 x2   -TSH , Lipid profile, A1c ordered  -2 D echo heart ordered  -Further ischemic w/up including stress, TTE both unremarkable  -currently all symptoms has resolved.  - pcp and cards fu outpt in 1-2 wks     Problem/Plan - 2:  ·  Problem: Dyspnea on exertion.   ·  Plan: CTA chest:  no PE, no PTX  -Plan as above  -ProBNP wnl     Problem/Plan - 3:  ·  Problem: Aortic thrombus.   ·  Plan: c/w Eliquis BID.     Problem/Plan - 4:  ·  Problem: Lung nodule.   ·  Plan: CTA chest: In the left upper lobe is a 4 mm noncalcified nodule.  -f/u with PCP regarding CT chest, can monitor serially as outpt.     Problem/Plan - 5:  ·  Problem: Anxiety.   ·  Plan: c/w xanax, reduced to PRN BID.     Problem/Plan - 6:  ·  Problem: Encounter for deep vein thrombosis (DVT) prophylaxis.   ·  Plan: On full a/c - Eliquis.     Problem/Plan - 7:  ·  Problem: R/O Delusions of parasitosis.   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  The diagnosis was mentioned by the pt as possibly used while admitted to ?[facility];  -Eosinophiles WNL on CBC with diff   -Hold Ivermectin until indication confirmed with the PCP  - ID consulted ____________________  - ID f/u   -Obtained detailed collateral from the PCP Dr. Bessie Kapoor DO ([phone])  -No 1:1 monitoring indicated     Patient seen and evaluated, no acute somatic complaints. Reviewed discharge medications with patient; All new medications requiring new prescriptions were sent to the pharmacy of patient's choice. Reviewed need for prescription for previous home medications and new prescriptions sent if requested. Medically cleared/stable for discharge as per Dr. Lundberg on 7/24/23 with close outpatient follow up within 1-2 weeks of discharge. Patient understands and agrees with plan of care. 63 y/o female w/ PMHx of C. diff (recent abx course 1.5 weeks ago) aortic thrombus (on Eliquis), anxiety, recent admission for anaphylaxis  a/w CP and CASILLAS; Also c/f delusional parasitosis;      Problem/Plan - 1:  ·  Problem: Chest pain.   ·  Plan: Unclear etiology; Low suspicion of ACS at this time; Multiple complaints;   -Telemetry: so far no events.   -EKG: no acute ischemia;   -Troponin 7 x2   -TSH , Lipid profile, A1c ordered  -2 D echo heart ordered  -Further ischemic w/up including stress, TTE both unremarkable  -currently all symptoms has resolved.  - pcp and cards fu outpt in 1-2 wks     Problem/Plan - 2:  ·  Problem: Dyspnea on exertion.   ·  Plan: CTA chest:  no PE, no PTX  -Plan as above  -ProBNP wnl     Problem/Plan - 3:  ·  Problem: Aortic thrombus.   ·  Plan: c/w Eliquis BID.     Problem/Plan - 4:  ·  Problem: Lung nodule.   ·  Plan: CTA chest: In the left upper lobe is a 4 mm noncalcified nodule.  -f/u with PCP regarding CT chest, can monitor serially as outpt.     Problem/Plan - 5:  ·  Problem: Anxiety.   ·  Plan: c/w xanax, reduced to PRN BID.     Problem/Plan - 6:  ·  Problem: Encounter for deep vein thrombosis (DVT) prophylaxis.   ·  Plan: On full a/c - Eliquis.     Problem/Plan - 7:  ·  Problem: R/O Delusions of parasitosis.   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  The diagnosis was mentioned by the pt as possibly used while admitted to ?[facility];  - Per PCP being used for long covid therapy  -Eosinophiles WNL on CBC with diff   - ivermectin x 5 days per pcp  - ID consulted and recommended to continue with therapy prescribed by outpatient PCP  - ID f/u outpt   -Obtained detailed collateral from the PCP Dr. Bessie Kapoor DO ([phone])  -No 1:1 monitoring indicated     Patient seen and evaluated, no acute somatic complaints. Reviewed discharge medications with patient; All new medications requiring new prescriptions were sent to the pharmacy of patient's choice. Reviewed need for prescription for previous home medications and new prescriptions sent if requested. Medically cleared/stable for discharge as per Dr. Stoll on 7/24/23 with close outpatient follow up within 1-2 weeks of discharge. Patient understands and agrees with plan of care. 61 y/o female w/ PMHx of C. diff (recent abx course 1.5 weeks ago) aortic thrombus (on Eliquis), anxiety, recent admission for anaphylaxis  a/w CP and CASILLAS; Also c/f delusional parasitosis;      Problem/Plan - 1:  ·  Problem: Chest pain.   ·  Plan: Unclear etiology; Low suspicion of ACS at this time; Multiple complaints;   -Telemetry: so far no events.   -EKG: no acute ischemia;   -Troponin 7 x2   -TSH , Lipid profile, A1c ordered  -2 D echo heart ordered  -Further ischemic w/up including stress, TTE both unremarkable  -currently all symptoms has resolved.  - pcp and cards fu outpt in 1-2 wks     Problem/Plan - 2:  ·  Problem: Dyspnea on exertion.   ·  Plan: CTA chest:  no PE, no PTX  -Plan as above  -ProBNP wnl     Problem/Plan - 3:  ·  Problem: Aortic thrombus.   ·  Plan: c/w Eliquis BID.     Problem/Plan - 4:  ·  Problem: Lung nodule.   ·  Plan: CTA chest: In the left upper lobe is a 4 mm noncalcified nodule.  -f/u with PCP regarding CT chest, can monitor serially as outpt.     Problem/Plan - 5:  ·  Problem: Anxiety.   ·  Plan: c/w xanax, reduced to PRN BID.     Problem/Plan - 6:  ·  Problem: Encounter for deep vein thrombosis (DVT) prophylaxis.   ·  Plan: On full a/c - Eliquis.     Problem/Plan - 7:  ·  Problem: R/O Delusions of parasitosis.   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  The diagnosis was mentioned by the pt as possibly used while admitted to ?[facility];  - Per PCP being used for long covid therapy  -Eosinophiles WNL on CBC with diff   - ivermectin x 5 days per pcp  - ID consulted and recommended to continue with therapy prescribed by outpatient PCP  - ID f/u outpt   -Obtained detailed collateral from the PCP Dr. Bessie Kapoor DO ([phone])  -No 1:1 monitoring indicated     Patient seen and evaluated, no acute somatic complaints. Reviewed discharge medications with patient; All new medications requiring new prescriptions were sent to the pharmacy of patient's choice. Reviewed need for prescription for previous home medications and new prescriptions sent if requested. Medically cleared/stable for discharge as per Dr. Stoll on 7/24/23 with close outpatient follow up within 1-2 weeks of discharge. Patient understands and agrees with plan of care.     Attending Addendum:  Patient seen and examined by me on the discharge day. Medications reviewed.   Feeling much better. She requests second opinion ID. No cp, no sob. no abd pain. no n/v/d. no HA, no Dizziness.  no rash. All questions answered in details. Follow up plan explained. d/w NP/PA.  Pt denied SI/HI ideations, denied visual and auditory hallucinations. denied psychotic thoughts.   More than 30 mins were spent evaluating patient and coordinating care for discharge.  d/w ID Dr. Griffin and psyc Dr. Lowery.   no easy treatment for delusional parasitosis. offered emotional support. I offered her psyc evaluation however, pt very defensive and prefers not to see them.   she has full capacity to make judgement decisions, except her delusions of being infected with parasite.  States she has a house here and in [place], she has fully paid off [employer]. States she has full social support. She would not want to be in the hospital and prefers to go home.   She is worried about c.diff though no BM for past few days. no watery diarrhea. I explained that c.diff is unlikely without watery diarrhea.   I advised a close outpt PCP and ID, derm, psyc follow up.  D/w SUSANNA Rangel.

## 2023-07-23 NOTE — DISCHARGE NOTE PROVIDER - CARE PROVIDER_API CALL
Bessie Kapoor  [address] [place], [place] [address]  Phone: [phone]  Fax: [phone]  Follow Up Time: 1 week

## 2023-07-23 NOTE — DISCHARGE NOTE PROVIDER - NSDCMRMEDTOKEN_GEN_ALL_CORE_FT
ALPRAZolam 1 mg oral tablet: 1 tab(s) orally 4 times a day as needed for  anxiety  ALPRAZolam 1 mg oral tablet: 1 tab(s) orally 2 times a day As needed for anxiety  atorvastatin 40 mg oral tablet: 1 tab(s) orally once a day  Eliquis 5 mg oral tablet: 1 tab(s) orally 2 times a day  EPINEPHrine 0.3 mg injectable kit: 0.3 milligram(s) intramuscularly once use as needed   melatonin 3 mg oral tablet: 1 tab(s) orally once a day (at bedtime) As needed Insomnia  meprobamate 400 mg oral tablet: 1 tab(s) orally 3 times a day, As Needed  pantoprazole 40 mg oral delayed release tablet: 1 tab(s) orally once a day (before a meal)  Pepcid 20 mg oral tablet: 1 tab(s) orally 2 times a day    ALPRAZolam 1 mg oral tablet: 1 tab(s) orally 2 times a day As needed for anxiety  atorvastatin 40 mg oral tablet: 1 tab(s) orally once a day  Eliquis 5 mg oral tablet: 1 tab(s) orally 2 times a day  EPINEPHrine 0.3 mg injectable kit: 0.3 milligram(s) intramuscularly once use as needed   ivermectin 3 mg oral tablet: 1 tab(s) orally 2 times a day  melatonin 3 mg oral tablet: 1 tab(s) orally once a day (at bedtime) As needed Insomnia  meprobamate 400 mg oral tablet: 1 tab(s) orally 3 times a day, As Needed  pantoprazole 40 mg oral delayed release tablet: 1 tab(s) orally once a day (before a meal)  Pepcid 20 mg oral tablet: 1 tab(s) orally 2 times a day

## 2023-07-23 NOTE — DISCHARGE NOTE PROVIDER - NSDCCPCAREPLAN_GEN_ALL_CORE_FT
PRINCIPAL DISCHARGE DIAGNOSIS  Diagnosis: Chest pain  Assessment and Plan of Treatment: You came to the hospital for chest pain. Cardiologist saw you. Your echocardiogram and stress test were normal. You also had difficulty breathing. CT of your chest was normal. There was a benign nodule on your left upper lobe of your left lung that was 4 mm, repeat ct chest in the future with pcp.   You were seen by infectious disease for parasite issues, they recommended continuing with Dr. Kapoor's recommendation for ivermectin for 5 days and following up with her and an ID doctor for further care

## 2023-07-23 NOTE — CONSULT NOTE ADULT - ASSESSMENT
61 y/o female w/ MHx of C. diff (recent abx course 1.5 weeks ago) aortic thrombus (on Eliquis), anxiety, recent admission for anaphylaxis due to idiopathic angioedema c/o weeks of left-sided worsening progressive chest pain with radiation to the neck and associated shortness of breath.    SOB  CTA w/ pulmonary nodules; no evidence of PE  no leukocytosis or eosinophilia   no lesions on skin or mucous membranes seen on exam at this time  pt is already on ivermection  though on subtherapeutic doses of ivermectin, unclear what she has been treated for  will not plan to send off parasite tests at this time  she can f/u with Dr. Hernandez  will f/u blood cultures while inpatient  stress test per cardiology    Elder Durbin M.D.  [facility], Division of Infectious Diseases  [phone]  After 5pm on weekdays and all day on weekends - please call [phone]

## 2023-07-23 NOTE — DISCHARGE NOTE PROVIDER - NSDCFUSCHEDAPPT_GEN_ALL_CORE_FT
Gus Alberto  [facility]  [facility] [address]  Scheduled Appointment: 08/03/2023    Jair Mace  [facility]ician [facility]  RHEUM [address]  Scheduled Appointment: 09/07/2023     Sea Bruce  [facility]  INFDISEASE [address]   Scheduled Appointment: 08/15/2023    Elbert Guillen  [facility]  INTMED [address]  Scheduled Appointment: 09/05/2023

## 2023-07-23 NOTE — DISCHARGE NOTE PROVIDER - NSFOLLOWUPCLINICS_GEN_ALL_ED_FT
[facility] Infectious Disease  Infectious Disease  [address] [facility], [place] [address]  Phone: [phone]  Fax:

## 2023-07-24 ENCOUNTER — TRANSCRIPTION ENCOUNTER (OUTPATIENT)
Age: 63
End: 2023-07-24

## 2023-07-24 VITALS
OXYGEN SATURATION: 99 % | HEART RATE: 68 BPM | TEMPERATURE: 98 F | RESPIRATION RATE: 18 BRPM | DIASTOLIC BLOOD PRESSURE: 60 MMHG | SYSTOLIC BLOOD PRESSURE: 108 MMHG

## 2023-07-24 LAB — FACT X ACT/NOR PPP: 82.9 % — SIGNIFICANT CHANGE UP (ref 70–150)

## 2023-07-24 PROCEDURE — 99222 1ST HOSP IP/OBS MODERATE 55: CPT

## 2023-07-24 RX ORDER — IVERMECTIN 3 MG/1
1 TABLET ORAL
Qty: 8 | Refills: 0
Start: 2023-07-24 | End: 2023-07-27

## 2023-07-24 RX ORDER — ALPRAZOLAM 0.25 MG
1 TABLET ORAL
Refills: 0 | DISCHARGE

## 2023-07-24 RX ADMIN — IVERMECTIN 3 MILLIGRAM(S): 3 TABLET ORAL at 12:10

## 2023-07-24 RX ADMIN — APIXABAN 5 MILLIGRAM(S): 2.5 TABLET, FILM COATED ORAL at 10:41

## 2023-07-24 NOTE — PROGRESS NOTE ADULT - REASON FOR ADMISSION
Referred by  PCP for chest pain
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
## 2023-07-24 NOTE — CONSULT NOTE ADULT - ASSESSMENT
61 y/o female w/ MHx of C. diff (recent abx course 1.5 weeks ago) aortic thrombus (on Eliquis), anxiety, recent admission for anaphylaxis due to idiopathic angioedema c/o weeks of left-sided worsening progressive chest pain with radiation to the neck and associated shortness of breath.  Asked to see 2nd opinion ID for need for ivermectin.    I did call and confirm with PCP that pt was prescribed low dose ivermectin 3 mg po bid x 5 days for her vague symptoms which may be related to post covid, though pt feels it is related to parasite venom after a bite.    I typically do not use ivermectin for this reason which I informed the patient and team of.  Pt says ivermectin helps her slightly and would like to continue it.  If ivermectin continues, I would not extend beyond 5 days and would not increase the dose.  Pt was requesting 3 pills per day but i informed her i would not increase it.    I do not see evidence of infection. Labs are stable.  No infection on CTPA and no PE noted.    Pt feels she still has parasite within her.  Seems reasonable to consider psyc evaluation also for delusions of parasitosis and for safe discharge home.     Cardiac w/up negative and defer care to primary team and cardiology for this.    I will sign off at this time.  D/w team and attg at length.    Call ID with questions    Kylah Griffin MD  [phone] (pager)  [phone] (office)  61 y/o female w/ MHx of C. diff (recent abx course 1.5 weeks ago) aortic thrombus (on Eliquis), anxiety, recent admission for anaphylaxis due to idiopathic angioedema c/o weeks of left-sided worsening progressive chest pain with radiation to the neck and associated shortness of breath.  Asked to see 2nd opinion ID for need for ivermectin and parasites.    I did call and confirm with PCP that pt was prescribed low dose ivermectin 3 mg po bid x 5 days for her vague symptoms which may be related to post covid, though pt feels it is related to parasite venom after a bite.    I typically do not use ivermectin for this reason which I informed the patient and team of.  Pt says ivermectin helps her slightly and would like to continue it.  If ivermectin continues, I would not extend beyond 5 days and would not increase the dose.  Pt was requesting 3 pills per day but i informed her i would not increase it.    I do not see evidence of infection. Labs are stable.  No infection on CTPA and no PE noted.    Pt feels she still has parasite within her.  Seems reasonable to consider psyc evaluation also for delusions of parasitosis and for safe discharge home.     Cardiac w/up negative and defer care to primary team and cardiology for this.  If any diarrhea, can check stool studies, O&P, GI pcr, and c diff pcr.    I will sign off at this time.  D/w team and attg at length.    Call ID with questions    Kylah Griffin MD  [phone] (pager)  [phone] (office)

## 2023-07-24 NOTE — PROGRESS NOTE ADULT - PROBLEM SELECTOR PLAN 4
CTA chest: In the left upper lobe is a 4 mm noncalcified nodule.  -f/u with outpt PCP for repeat CT chest, can monitor serially as outpt.
CTA chest: In the left upper lobe is a 4 mm noncalcified nodule.  -f/u with PCP regarding CT chest, can monitor serially as outpt.
CTA chest: In the left upper lobe is a 4 mm noncalcified nodule.  -f/u with PCP regarding CT chest, can monitor serially as outpt.

## 2023-07-24 NOTE — CONSULT NOTE ADULT - REASON FOR ADMISSION
Referred by  PCP for chest pain

## 2023-07-24 NOTE — PROGRESS NOTE ADULT - ASSESSMENT
A/P    61 y/o female w/ MHx of C. diff (recent abx course 1.5 weeks ago) aortic thrombus (on Eliquis), anxiety, recent admission for anaphylaxis  a/w CP and CASILLAS; Also c/f delusional parasitosis;     #Chest pain.   -atypical symptoms, but recurrent   -no acute ischemic ecg abnl   -echo w normal LVEF  -stress normal    #Dyspnea on exertion.   -no clinical HF, volume overload  -cta no pe, effusion, etiology elucidated  -echo as above    #history of Aortic thrombus.   -cta w/o evidence of sig aortic plaque   -continue Eliquis BID for now     #Lung nodule.  -outpt f/u for 4 mm noncalcified nodule.    #Anxiety.  -c/w xanax, med eval     #R/O Delusions of parasitosis.   -multiple complaints and unclear indication for Ivermectin concerning for delusional parasitosis  -diagnosis was mentioned by the pt as possibly used while admitted to ?[facility];  -Hold Ivermectin until indication confirmed with the PCP  -ID f/u  -PSYCH eval   -med f/u      45 minutes spent on total encounter; more than 50% of the visit was spent counseling and/or coordinating care by the attending physician.  
63 y/o female w/ PMHx of C. diff (recent abx course 1.5 weeks ago) aortic thrombus (on Eliquis), anxiety, recent admission for anaphylaxis  a/w CP and CASILLAS; Also c/f delusional parasitosis; 
A/P    61 y/o female w/ MHx of C. diff (recent abx course 1.5 weeks ago) aortic thrombus (on Eliquis), anxiety, recent admission for anaphylaxis  a/w CP and CASILLAS; Also c/f delusional parasitosis;     #Chest pain.   -atypical symptoms, but recurrent   -no acute ischemic ecg abnl   -check echo, await stress today     #Dyspnea on exertion.   -no clinical HF, volume overload  -cta no pe, effusion, etiology elucidated  -f/u echo  -await stress testing     #history of Aortic thrombus.   -cta w/o evidence of sig aortic plaque   -continue Eliquis BID for now     #Lung nodule.  -outpt f/u for 4 mm noncalcified nodule.    #Anxiety.  -c/w xanax, med eval     #R/O Delusions of parasitosis.   -multiple complaints and unclear indication for Ivermectin concerning for delusional parasitosis  -diagnosis was mentioned by the pt as possibly used while admitted to ?[facility];  -Hold Ivermectin until indication confirmed with the PCP  -ID f/u  -PSYCH eval   -med f/u      45 minutes spent on total encounter; more than 50% of the visit was spent counseling and/or coordinating care by the attending physician.  
61 y/o female w/ PMHx of C. diff (recent abx course 1.5 weeks ago) aortic thrombus (on Eliquis), anxiety, recent admission for anaphylaxis  a/w CP and CASILLAS; Also c/f delusional parasitosis; 
63 y/o female w/ PMHx of C. diff (recent abx course 1.5 weeks ago) aortic thrombus (on Eliquis), anxiety, recent admission for anaphylaxis  a/w CP and CASILLAS; Also c/f delusional parasitosis;

## 2023-07-24 NOTE — PROGRESS NOTE ADULT - SUBJECTIVE AND OBJECTIVE BOX
CARDIOLOGY FOLLOW UP NOTE - DR. RIOS    Patient Name: BRIAN URBANO  Date of Service: 07-23-23 @ 10:36    Patient seen and examined    Subjective:    cv: denies chest pain, dyspnea, palpitations, dizziness  pulmonary: denies cough  GI: denies abdominal pain, nausea, vomiting  vascular/legs: no edema   skin: no rash  ROS: otherwise negative   overnight events:      PHYSICAL EXAM:  T(C): 36.6 (07-23-23 @ 05:51), Max: 36.6 (07-22-23 @ 12:00)  HR: 63 (07-23-23 @ 05:51) (61 - 69)  BP: 123/58 (07-23-23 @ 05:51) (101/62 - 136/66)  RR: 18 (07-23-23 @ 05:51) (18 - 18)  SpO2: 97% (07-23-23 @ 05:51) (97% - 99%)  Wt(kg): --  I&O's Summary    22 Jul 2023 07:01  -  23 Jul 2023 07:00  --------------------------------------------------------  IN: 472 mL / OUT: 0 mL / NET: 472 mL      Daily     Daily     Appearance: Normal	  Cardiovascular: Normal S1 S2,RRR, No JVD, No murmurs  Respiratory: Lungs clear to auscultation	  Gastrointestinal:  Soft, Non-tender, + BS	  Extremities: Normal range of motion, No clubbing, cyanosis or edema      Home Medications:  ALPRAZolam 1 mg oral tablet: 1 tab(s) orally 4 times a day as needed for  anxiety (22 Jul 2023 01:42)  atorvastatin 40 mg oral tablet: 1 tab(s) orally once a day (13 Sep 2022 07:13)  Eliquis 5 mg oral tablet: 1 tab(s) orally 2 times a day (13 Sep 2022 07:13)  meprobamate 400 mg oral tablet: 1 tab(s) orally 3 times a day, As Needed (13 Sep 2022 07:13)      MEDICATIONS  (STANDING):  apixaban 5 milliGRAM(s) Oral every 12 hours  atorvastatin 40 milliGRAM(s) Oral at bedtime  ivermectin 3 milliGRAM(s) Oral daily      TELEMETRY: 	    ECG:  	  RADIOLOGY:   DIAGNOSTIC TESTING:  [ ] Echocardiogram:  [ ] Catheterization:  [ ] Stress Test:    OTHER: 	    LABS:	 	    CARDIAC MARKERS:        Troponin T, High Sensitivity Result: 7 ng/L (07-21 @ 19:41)  Troponin T, High Sensitivity Result: 7 ng/L (07-21 @ 14:23)                                13.4   6.17  )-----------( 255      ( 23 Jul 2023 07:35 )             41.4     07-23    138  |  110<H>  |  12  ----------------------------<  84  3.7   |  22  |  0.89    Ca    9.3      23 Jul 2023 07:35  Phos  3.3     07-23  Mg     2.00     07-23    TPro  6.5  /  Alb  4.0  /  TBili  <0.2  /  DBili  x   /  AST  18  /  ALT  18  /  AlkPhos  83  07-21    proBNP:   PT/INR - ( 21 Jul 2023 15:52 )   PT: 16.1 sec;   INR: 1.38 ratio         PTT - ( 21 Jul 2023 15:52 )  PTT:30.9 sec  Lipid Profile:   HgA1c:     Creatinine: 0.89 mg/dL (07-23-23 @ 07:35)  Creatinine: 0.95 mg/dL (07-21-23 @ 14:23)            
CARDIOLOGY FOLLOW UP - Dr. Lundberg  Date of Service: 7/24/2023  CC: no cp/sob    Review of Systems:  Constitutional: No fever, weight loss, or fatigue  Respiratory: No cough, wheezing, or hemoptysis, no shortness of breath  Cardiovascular: No chest pain, palpitations, passing out, dizziness, or leg swelling  Gastrointestinal: No abd or epigastric pain. No nausea, vomiting, or hematemesis; no diarrhea or consiptaiton, no melena or hematochezia  Vascular: No edema     TELEMETRY:    PHYSICAL EXAM:  T(C): 36.8 (07-24-23 @ 10:32), Max: 37 (07-23-23 @ 19:00)  HR: 62 (07-24-23 @ 10:32) (62 - 91)  BP: 109/52 (07-24-23 @ 10:32) (108/63 - 150/69)  RR: 17 (07-24-23 @ 10:32) (17 - 17)  SpO2: 98% (07-24-23 @ 10:32) (97% - 99%)  Wt(kg): --  I&O's Summary    23 Jul 2023 07:01  -  24 Jul 2023 07:00  --------------------------------------------------------  IN: 472 mL / OUT: 0 mL / NET: 472 mL    24 Jul 2023 07:01  -  24 Jul 2023 11:03  --------------------------------------------------------  IN: 120 mL / OUT: 0 mL / NET: 120 mL        Appearance: Normal	  Cardiovascular: Normal S1 S2,RRR, No JVD, No murmurs  Respiratory: Lungs clear to auscultation	  Gastrointestinal:  Soft, Non-tender, + BS	  Extremities: Normal range of motion, No clubbing, cyanosis or edema  Vascular: Peripheral pulses palpable 2+ bilaterally       Home Medications:  ALPRAZolam 1 mg oral tablet: 1 tab(s) orally 4 times a day as needed for  anxiety (22 Jul 2023 01:42)  ALPRAZolam 1 mg oral tablet: 1 tab(s) orally 2 times a day As needed for anxiety (23 Jul 2023 16:45)  atorvastatin 40 mg oral tablet: 1 tab(s) orally once a day (13 Sep 2022 07:13)  Eliquis 5 mg oral tablet: 1 tab(s) orally 2 times a day (13 Sep 2022 07:13)  melatonin 3 mg oral tablet: 1 tab(s) orally once a day (at bedtime) As needed Insomnia (23 Jul 2023 16:45)  meprobamate 400 mg oral tablet: 1 tab(s) orally 3 times a day, As Needed (13 Sep 2022 07:13)        MEDICATIONS  (STANDING):  apixaban 5 milliGRAM(s) Oral every 12 hours  atorvastatin 40 milliGRAM(s) Oral at bedtime  ivermectin 3 milliGRAM(s) Oral daily        EKG:  RADIOLOGY:  DIAGNOSTIC TESTING:  [ ] Echocardiogram:  [ ] Catherterization:  [ ] Stress Test:  OTHER:     LABS:	 	                          13.4   6.17  )-----------( 255      ( 23 Jul 2023 07:35 )             41.4     07-23    138  |  110<H>  |  12  ----------------------------<  84  3.7   |  22  |  0.89    Ca    9.3      23 Jul 2023 07:35  Phos  3.3     07-23  Mg     2.00     07-23            CARDIAC MARKERS:                  
SUBJECTIVE/ OVERNIGHT EVENTS:  Pt seen and examined at bedside.   No overnight event.  Feeling better.  no cp, no sob, no n/v/d. no abdominal pain.  no headache, no dizziness.   no rash.  she is feeling much better  stable on tele.  awaiting stress test.       --------------------------------------------------------------------------------------------  LABS:                        12.7   6.62  )-----------( 281      ( 21 Jul 2023 14:23 )             38.5     07-21    142  |  108<H>  |  9   ----------------------------<  129<H>  3.9   |  21<L>  |  0.95    Ca    9.7      21 Jul 2023 14:23    TPro  6.5  /  Alb  4.0  /  TBili  <0.2  /  DBili  x   /  AST  18  /  ALT  18  /  AlkPhos  83  07-21    PT/INR - ( 21 Jul 2023 15:52 )   PT: 16.1 sec;   INR: 1.38 ratio         PTT - ( 21 Jul 2023 15:52 )  PTT:30.9 sec  CAPILLARY BLOOD GLUCOSE            Urinalysis Basic - ( 21 Jul 2023 14:23 )    Color: x / Appearance: x / SG: x / pH: x  Gluc: 129 mg/dL / Ketone: x  / Bili: x / Urobili: x   Blood: x / Protein: x / Nitrite: x   Leuk Esterase: x / RBC: x / WBC x   Sq Epi: x / Non Sq Epi: x / Bacteria: x        RADIOLOGY & ADDITIONAL TESTS:    Imaging Personally Reviewed:  [x] YES  [ ] NO    Consultant(s) Notes Reviewed:  [x] YES  [ ] NO    MEDICATIONS  (STANDING):  apixaban 5 milliGRAM(s) Oral every 12 hours  atorvastatin 40 milliGRAM(s) Oral at bedtime  ivermectin 3 milliGRAM(s) Oral daily    MEDICATIONS  (PRN):  ALPRAZolam 1 milliGRAM(s) Oral two times a day PRN for anxiety  melatonin 3 milliGRAM(s) Oral at bedtime PRN Insomnia      Care Discussed with Consultants/Other Providers [x] YES  [ ] NO    Vital Signs Last 24 Hrs  T(C): 36.6 (22 Jul 2023 12:00), Max: 36.6 (21 Jul 2023 22:07)  T(F): 97.9 (22 Jul 2023 12:00), Max: 97.9 (21 Jul 2023 22:07)  HR: 69 (22 Jul 2023 12:00) (60 - 80)  BP: 101/62 (22 Jul 2023 12:00) (101/62 - 151/73)  BP(mean): --  RR: 18 (22 Jul 2023 12:00) (14 - 18)  SpO2: 98% (22 Jul 2023 12:00) (96% - 100%)    Parameters below as of 22 Jul 2023 12:00  Patient On (Oxygen Delivery Method): room air      I&O's Summary    22 Jul 2023 07:01  -  22 Jul 2023 19:00  --------------------------------------------------------  IN: 472 mL / OUT: 0 mL / NET: 472 mL      PHYSICAL EXAM:  GENERAL: NAD, well-developed, comfortable  HEAD:  Atraumatic, Normocephalic  EYES: EOMI, PERRLA, conjunctiva and sclera clear  NECK: Supple, No JVD  CHEST/LUNG: Clear to auscultation bilaterally; No wheeze  HEART: Regular rate and rhythm; No murmurs, rubs, or gallops  ABDOMEN: Soft, Nontender, Nondistended; Bowel sounds present  Neuro: AAOx3, no focal weakness, 5/5 b/l extremity strength  EXTREMITIES:  2+ Peripheral Pulses, No clubbing, cyanosis, or edema, no toe edema. no hematoma, no bruises. healed shin scar.   SKIN: No rashes or lesions, both in her back, back of neck, legs, chest, abdomen. no notable lesions.     
SUBJECTIVE/ OVERNIGHT EVENTS:  she states she is feeling better  agreeable to going home after ID eval  she is still convinced that there is a parasite in her body.  states she has been dealing with this for three years.   no cp, no sob, no n/v/d. no abdominal pain.  no headache, no dizziness.       --------------------------------------------------------------------------------------------  LABS:                        13.4   6.17  )-----------( 255      ( 23 Jul 2023 07:35 )             41.4     07-23    138  |  110<H>  |  12  ----------------------------<  84  3.7   |  22  |  0.89    Ca    9.3      23 Jul 2023 07:35  Phos  3.3     07-23  Mg     2.00     07-23        CAPILLARY BLOOD GLUCOSE            Urinalysis Basic - ( 23 Jul 2023 07:35 )    Color: x / Appearance: x / SG: x / pH: x  Gluc: 84 mg/dL / Ketone: x  / Bili: x / Urobili: x   Blood: x / Protein: x / Nitrite: x   Leuk Esterase: x / RBC: x / WBC x   Sq Epi: x / Non Sq Epi: x / Bacteria: x        RADIOLOGY & ADDITIONAL TESTS:    Imaging Personally Reviewed:  [x] YES  [ ] NO    Consultant(s) Notes Reviewed:  [x] YES  [ ] NO    MEDICATIONS  (STANDING):  apixaban 5 milliGRAM(s) Oral every 12 hours  atorvastatin 40 milliGRAM(s) Oral at bedtime  ivermectin 3 milliGRAM(s) Oral daily    MEDICATIONS  (PRN):  ALPRAZolam 1 milliGRAM(s) Oral two times a day PRN for anxiety  melatonin 3 milliGRAM(s) Oral at bedtime PRN Insomnia      Care Discussed with Consultants/Other Providers [x] YES  [ ] NO    Vital Signs Last 24 Hrs  T(C): 36.8 (24 Jul 2023 10:32), Max: 37 (23 Jul 2023 19:00)  T(F): 98.2 (24 Jul 2023 10:32), Max: 98.6 (23 Jul 2023 19:00)  HR: 62 (24 Jul 2023 10:32) (62 - 91)  BP: 109/52 (24 Jul 2023 10:32) (108/63 - 150/69)  BP(mean): --  RR: 17 (24 Jul 2023 10:32) (17 - 17)  SpO2: 98% (24 Jul 2023 10:32) (97% - 99%)    Parameters below as of 24 Jul 2023 10:32  Patient On (Oxygen Delivery Method): room air      I&O's Summary    23 Jul 2023 07:01  -  24 Jul 2023 07:00  --------------------------------------------------------  IN: 472 mL / OUT: 0 mL / NET: 472 mL    24 Jul 2023 07:01  -  24 Jul 2023 13:35  --------------------------------------------------------  IN: 120 mL / OUT: 0 mL / NET: 120 mL        PHYSICAL EXAM:  GENERAL: NAD, well-developed, comfortable  HEAD:  Atraumatic, Normocephalic  EYES: EOMI, PERRLA, conjunctiva and sclera clear  NECK: Supple, No JVD, no lymphadenopathy   CHEST/LUNG: Clear to auscultation bilaterally; No wheeze  HEART: Regular rate and rhythm; No murmurs, rubs, or gallops  ABDOMEN: Soft, Nontender, Nondistended; Bowel sounds present  Neuro: AAOx3, no focal weakness, 5/5 b/l extremity strength  EXTREMITIES:  2+ Peripheral Pulses, No clubbing, cyanosis, or edema, no toe edema. no hematoma, no bruises. healed shin scar.   SKIN: No rashes or lesions, both in her back, back of neck, legs, chest, abdomen. no notable lesions.      
SUBJECTIVE/ OVERNIGHT EVENTS:  Pt seen and examined earlier today.   no events. comfortable.   denied pain.   no cp, no sob, no n/v/d.  no HA, no dizziness. no abdominal pain.   all the tests neg, including stress test, TTE.  she has not moved her bowels in 2 days.     Pt is convinced that she is "being eating alive by parasites"  Requests second opinion ID corinne. House called.       --------------------------------------------------------------------------------------------  LABS:                        13.4   6.17  )-----------( 255      ( 23 Jul 2023 07:35 )             41.4     07-23    138  |  110<H>  |  12  ----------------------------<  84  3.7   |  22  |  0.89    Ca    9.3      23 Jul 2023 07:35  Phos  3.3     07-23  Mg     2.00     07-23        CAPILLARY BLOOD GLUCOSE            Urinalysis Basic - ( 23 Jul 2023 07:35 )    Color: x / Appearance: x / SG: x / pH: x  Gluc: 84 mg/dL / Ketone: x  / Bili: x / Urobili: x   Blood: x / Protein: x / Nitrite: x   Leuk Esterase: x / RBC: x / WBC x   Sq Epi: x / Non Sq Epi: x / Bacteria: x        RADIOLOGY & ADDITIONAL TESTS:    Imaging Personally Reviewed:  [x] YES  [ ] NO    Consultant(s) Notes Reviewed:  [x] YES  [ ] NO    MEDICATIONS  (STANDING):  apixaban 5 milliGRAM(s) Oral every 12 hours  atorvastatin 40 milliGRAM(s) Oral at bedtime  ivermectin 3 milliGRAM(s) Oral daily    MEDICATIONS  (PRN):  ALPRAZolam 1 milliGRAM(s) Oral two times a day PRN for anxiety  melatonin 3 milliGRAM(s) Oral at bedtime PRN Insomnia      Care Discussed with Consultants/Other Providers [x] YES  [ ] NO    Vital Signs Last 24 Hrs  T(C): 36.7 (23 Jul 2023 14:00), Max: 36.7 (23 Jul 2023 14:00)  T(F): 98 (23 Jul 2023 14:00), Max: 98 (23 Jul 2023 14:00)  HR: 65 (23 Jul 2023 14:00) (61 - 65)  BP: 127/69 (23 Jul 2023 14:00) (103/59 - 136/66)  BP(mean): --  RR: 17 (23 Jul 2023 14:00) (17 - 18)  SpO2: 99% (23 Jul 2023 14:00) (97% - 99%)    Parameters below as of 23 Jul 2023 14:00  Patient On (Oxygen Delivery Method): room air      I&O's Summary    22 Jul 2023 07:01  -  23 Jul 2023 07:00  --------------------------------------------------------  IN: 472 mL / OUT: 0 mL / NET: 472 mL            PHYSICAL EXAM:  GENERAL: NAD, well-developed, comfortable  HEAD:  Atraumatic, Normocephalic  EYES: EOMI, PERRLA, conjunctiva and sclera clear  NECK: Supple, No JVD  CHEST/LUNG: Clear to auscultation bilaterally; No wheeze  HEART: Regular rate and rhythm; No murmurs, rubs, or gallops  ABDOMEN: Soft, Nontender, Nondistended; Bowel sounds present  Neuro: AAOx3, no focal weakness, 5/5 b/l extremity strength  EXTREMITIES:  2+ Peripheral Pulses, No clubbing, cyanosis, or edema, no toe edema. no hematoma, no bruises. healed shin scar.   SKIN: No rashes or lesions, both in her back, back of neck, legs, chest, abdomen. no notable lesions.

## 2023-07-24 NOTE — PROGRESS NOTE ADULT - PROBLEM SELECTOR PLAN 7
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  The diagnosis was mentioned by the pt as possibly used while admitted to ?[facility];  -Eosinophiles WNL on CBC with diff   -Hold Ivermectin until indication confirmed with the PCP  -Obtain detailed collateral from the PCP Dr. Bessie Kapoor DO ([phone])  -No 1:1 monitoring indicated   -Pt has capacity to leave A
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  The diagnosis was mentioned by the pt as possibly used while admitted to [facility];  -Eosinophiles WNL on CBC with diff   -Holding Ivermectin until indication confirmed with the PCP, however, pt insist on resuming.   -Obtained detailed collateral from the PCP Dr. Bessie Kapoor DO ([phone])  -per PCP, pt on Ivermectin for post/long covid syndrome  -No 1:1 monitoring indicated, Pt has full capacity to leave AMA
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  The diagnosis was mentioned by the pt as possibly used while admitted to [facility];  -Eosinophiles WNL on CBC with diff   -Holding Ivermectin until indication confirmed with the PCP, however, pt insist on resuming.   -Obtained detailed collateral from the PCP Dr. Bessie Kapoor DO ([phone])  -No 1:1 monitoring indicated, Pt has full capacity to leave A

## 2023-07-24 NOTE — PROGRESS NOTE ADULT - PROBLEM SELECTOR PLAN 1
Unclear etiology; Low suspicion of ACS at this time; Multiple complaints;   -Telemetry: so far no events.   -EKG: no acute ischemia;   -Troponin 7 x2   -TSH , Lipid profile, A1c reviewed  -2 D echo heart: normal LV  -ischemic w/up with stress test: normal.   -currently all symptoms has resolved.
Unclear etiology; Low suspicion of ACS at this time; Multiple complaints;   -Telemetry: so far no events.   -EKG: no acute ischemia;   -Troponin 7 x2   -TSH , Lipid profile, A1c ordered  -2 D echo heart ordered  -Further ischemic w/up including stress deferred to Dr. Lundberg, cardiology    -currently all symptoms has resolved.
Unclear etiology; Low suspicion of ACS at this time; Multiple complaints;   -Telemetry: so far no events.   -EKG: no acute ischemia;   -Troponin 7 x2   -TSH , Lipid profile, A1c reviewed  -2 D echo heart ordered: normal LV  -ischemic w/up with stress test: normal.   -currently all symptoms has resolved.

## 2023-07-24 NOTE — CHART NOTE - NSCHARTNOTEFT_GEN_A_CORE
Brief Psych CL Note:  I spoke with primary attending Dr. Stoll. He discussed that he did not feel that patient would be amenable to psych CL consult (see his note) and that it would further exacerbate her mood. He did not have any overt safety concerns. Therefore, will hold off on actual consult at this time.    An effective approach for treating delusional parasitosis is generally led by the primary provider and involving dermatology, psychiatry, etc. Brief Psych CL Note:  I spoke with primary attending Dr. Stoll. He discussed that he did not feel that patient would be amenable to psych CL consult (see his note) and that it would further exacerbate her mood. He did not have any overt safety concerns. Therefore, will hold off on actual consult at this time.    An effective approach for treating delusional parasitosis is generally led by the primary provider and involving dermatology, psychiatry, etc.  As patient likely to leave today, would hold off on starting psychiatric medication- patient likely would not take it anyway as she does not feel there is a psychiatric component.  If anything changes, please do not hesitate to call back.

## 2023-07-24 NOTE — PROGRESS NOTE ADULT - NSPROGADDITIONALINFOA_GEN_ALL_CORE
I have reviewed numerous documents and records that she has brought in at bedside.   Reviewed numerous images of macular rashes, from different parts of her body, and circular bite krystle from foot.  (all of these have currently resolved).    She has [facility]a whole page listed[facility] For exc[facility], hematologist, dermatology, GI, ID, multiple PCP Dr. Kapoor and Dr. Parrihs.    She explores possibility of getting MRI of the brain as well as her abdomen while she is here.  I have explained to her that MRI wait time is usually 3-5 days in [facility] and there is no current clinical indication for doing so.    She continues to feel well. stress test, TTE, all normal. Her CTa chest is neg for PE.     Her PCP Dr. Kapoor is heavily involved and currently treating her with alternative weeks of low dose Ivermectin. (per pt, her rash has resolved since he has been on this regimen and prefers to continue so). Risk/ benefits discussed.    Given all her normal labs, imaging, it would be best to follow up with her PCP Dr. Kapoor on discharge as opposed to restarting all the numerous workups she already done over the course of years.  She can follow up with outpt PCP, ID and dermatology.     Pt requests second opinion ID corinne. convinced that she is being eaten alive by parasites living in her body. I offered her psyc eval and pt became furious, stating this is an insult. She refused psyc eval.   I explained to her that work up in the past has negative, blood work here all normal.  She insists that at least another ID team needs to evaluate her before dc planning home.     - Dr. MADELYN Stoll ([facility])  - [phone]
I have reviewed numerous documents and records that she has brought in at bedside.   Reviewed numerous images of macular rashes, from different parts of her body, and circular bite krystle from foot.  (all of these have currently resolved).    She has [facility]a whole page listed[facility] For excample, hematologist, dermatology, GI, ID, multiple PCP Dr. Kapoor and Dr. Parrish.    She explores possibility of getting MRI of the brain as well as her abdomen while she is here.  I have explained to her that MRI wait time is usually 3-5 days in [facility] and there is no current clinical indication for doing so.    She continues to feel well. Awaiting stress test. Her CTa chest is neg for PE.     Her PCP Dr. Kapoor is heavily involved and currently treating her with alternative weeks of low dose Ivermectin. (per pt, her rash has resolved since he has been on this regimen and prefers to continue so). Risk/ benefits discussed.    Given all her normal labs, imaging, it would be best to follow up with her PCP Dr. Kapoor on discharge as opposed to restarting all the numerous workups she already done over the course of years.  She can follow up with outpt PCP, ID and dermatology.     DC PLANNING if negative stress test.      d/w cardiology.  d/w SUSANNA Hoffman.     - Dr. MADELYN Stoll ([facility])  - [phone]
I have reviewed numerous documents and records that she has brought in at bedside.   Reviewed numerous images of macular rashes, from different parts of her body, and circular bite krystle from foot.  (all of these have currently resolved).    She has seen numerous specialists (a whole page listed) sometimes multiple from the same speciality. For example, hematologist, dermatology, GI, ID, multiple PCP Dr. Kapoor and Dr. Parrish.    She explores possibility of getting MRI of the brain as well as her abdomen while she is here.  I have explained to her that MRI wait time is usually 3-5 days in [facility] and there is no current clinical indication for doing so.    She continues to feel well. stress test, TTE, all normal. Her CTa chest is neg for PE.     Her PCP Dr. Kapoor is heavily involved and currently treating her with alternative weeks of low dose Ivermectin. (per pt, her rash has resolved since he has been on this regimen and prefers to continue so). Risk/ benefits discussed. per PCP this is for long covid syndrome PRN.     Given all her normal labs, imaging, it would be best to follow up with her PCP Dr. Kapoor on discharge as opposed to restarting all the numerous workups she already done over the course of years. She can follow up with outpt PCP, ID and dermatology.     Pt requests second opinion ID corinne. she is convinced that she is being eaten alive by parasites living in her body. I offered her psyc eval and pt became furious, stating this is an insult. She refused psyc eval.  I explained to her that work up in the past has negative, blood work here all normal.  She insists that at least another ID team needs to evaluate her before dc planning home.     - Dr. MADELYN Stoll ([facility])  - [phone]

## 2023-07-24 NOTE — DISCHARGE NOTE NURSING/CASE MANAGEMENT/SOCIAL WORK - PATIENT PORTAL LINK FT
You can access the FollowMyHealth Patient Portal offered by [facility] by registering at the following website: [email or web address]. By joining [facility]’s FollowMyHealth portal, you will also be able to view your health information using other applications (apps) compatible with our system.

## 2023-07-24 NOTE — PROGRESS NOTE ADULT - PROBLEM SELECTOR PLAN 2
CTA chest: no PE, no PTX  -Plan as above  -ProBNP

## 2023-07-26 LAB
CULTURE RESULTS: SIGNIFICANT CHANGE UP
SPECIMEN SOURCE: SIGNIFICANT CHANGE UP

## 2023-08-03 ENCOUNTER — APPOINTMENT (OUTPATIENT)
Dept: FAMILY MEDICINE | Facility: CLINIC | Age: 63
End: 2023-08-03
Payer: MEDICARE

## 2023-08-03 VITALS
TEMPERATURE: 97.7 F | SYSTOLIC BLOOD PRESSURE: 130 MMHG | RESPIRATION RATE: 14 BRPM | BODY MASS INDEX: 26.22 KG/M2 | DIASTOLIC BLOOD PRESSURE: 74 MMHG | WEIGHT: 148 LBS | HEART RATE: 71 BPM | OXYGEN SATURATION: 98 % | HEIGHT: 63 IN

## 2023-08-03 DIAGNOSIS — Z12.39 ENCOUNTER FOR OTHER SCREENING FOR MALIGNANT NEOPLASM OF BREAST: ICD-10-CM

## 2023-08-03 DIAGNOSIS — F17.210 NICOTINE DEPENDENCE, CIGARETTES, UNCOMPLICATED: ICD-10-CM

## 2023-08-03 DIAGNOSIS — Z87.19 PERSONAL HISTORY OF OTHER DISEASES OF THE DIGESTIVE SYSTEM: ICD-10-CM

## 2023-08-03 DIAGNOSIS — A04.72 ENTEROCOLITIS DUE TO CLOSTRIDIUM DIFFICILE, NOT SPECIFIED AS RECURRENT: ICD-10-CM

## 2023-08-03 DIAGNOSIS — F41.1 GENERALIZED ANXIETY DISORDER: ICD-10-CM

## 2023-08-03 DIAGNOSIS — Z87.2 PERSONAL HISTORY OF DISEASES OF THE SKIN AND SUBCUTANEOUS TISSUE: ICD-10-CM

## 2023-08-03 DIAGNOSIS — Z87.898 PERSONAL HISTORY OF OTHER SPECIFIED CONDITIONS: ICD-10-CM

## 2023-08-03 DIAGNOSIS — Z01.818 ENCOUNTER FOR OTHER PREPROCEDURAL EXAMINATION: ICD-10-CM

## 2023-08-03 DIAGNOSIS — Z86.19 PERSONAL HISTORY OF OTHER INFECTIOUS AND PARASITIC DISEASES: ICD-10-CM

## 2023-08-03 DIAGNOSIS — Z13.9 ENCOUNTER FOR SCREENING, UNSPECIFIED: ICD-10-CM

## 2023-08-03 PROCEDURE — 99215 OFFICE O/P EST HI 40 MIN: CPT

## 2023-08-03 RX ORDER — METRONIDAZOLE 500 MG/1
500 TABLET ORAL EVERY 8 HOURS
Qty: 42 | Refills: 0 | Status: DISCONTINUED | COMMUNITY
Start: 2023-06-29 | End: 2023-08-03

## 2023-08-03 RX ORDER — FLUTICASONE PROPIONATE 50 UG/1
50 SPRAY, METERED NASAL TWICE DAILY
Qty: 1 | Refills: 5 | Status: DISCONTINUED | COMMUNITY
Start: 2020-07-28 | End: 2023-08-03

## 2023-08-03 RX ORDER — NICOTINE 21 MG/24HR
14 PATCH, TRANSDERMAL 24 HOURS TRANSDERMAL
Qty: 28 | Refills: 0 | Status: DISCONTINUED | COMMUNITY
Start: 2022-06-07 | End: 2023-08-03

## 2023-08-03 NOTE — ASSESSMENT
[Patient Optimized for Surgery] : Patient optimized for surgery [FreeTextEntry4] : Patient is cleared for procedure [FreeTextEntry1] : Patient is medically Clear for Procedure  Patient had Had TIA 7/7/23 as per neuro. Then had Neuro EVAL had MRI moderate small ischemic changes Getting work up at [facility] brain surgeon. Patient believes that his is related to sea urchin that she steeped on years ago. Patient had PVD and multiple clots on Eliquis, is followed by ID, Gastro, Neuro, Vascular, 911 Program, Pulmonary, and colorectal surgery. She is also followed by [facility] by multiple providers. care plan reviewed. meds reviewed/ renewed. Continue with specialist.

## 2023-08-03 NOTE — HISTORY OF PRESENT ILLNESS
[No Pertinent Cardiac History] : no history of aortic stenosis, atrial fibrillation, coronary artery disease, recent myocardial infarction, or implantable device/pacemaker [No Pertinent Pulmonary History] : no history of asthma, COPD, sleep apnea, or smoking [Aortic Stenosis] : no aortic stenosis [Atrial Fibrillation] : no atrial fibrillation [Coronary Artery Disease] : no coronary artery disease [Recent Myocardial Infarction] : no recent myocardial infarction [Implantable Device/Pacemaker] : no implantable device/pacemaker [Asthma] : no asthma [COPD] : no COPD [Sleep Apnea] : no sleep apnea [Smoker] : not a smoker [Chronic Anticoagulation] : no chronic anticoagulation [Chronic Kidney Disease] : no chronic kidney disease [Diabetes] : no diabetes [FreeTextEntry1] : MRA of Brain with possible intervention [FreeTextEntry2] : 08/10/23 [FreeTextEntry3] : Dr AARON [FreeTextEntry4] : Patient had Had TIA 7/7/23 as per neuro. Then had Neuro EVAL had MRI moderate small ischemic changes Getting work up at [facility] surgeon. Patient believes that his is related to sea urchin that she steeped on years ago. Patient had PVD and multiple clots on Eliquis, is followed by ID, Gastro, Neuro, Vascular, 911 Program, Pulmonary, and colorectal surgery. She is also followed by [facility] by multiple providers.

## 2023-08-03 NOTE — HEALTH RISK ASSESSMENT
[Yes] : Yes [Monthly or less (1 pt)] : Monthly or less (1 point) [1 or 2 (0 pts)] : 1 or 2 (0 points) [Never (0 pts)] : Never (0 points) [No] : In the past 12 months have you used drugs other than those required for medical reasons? No [No falls in past year] : Patient reported no falls in the past year [0] : 2) Feeling down, depressed, or hopeless: Not at all (0) [PHQ-2 Negative - No further assessment needed] : PHQ-2 Negative - No further assessment needed [Patient/Caregiver not ready to engage] : , patient/caregiver not ready to engage [I will adhere to the patient's wishes.] : I will adhere to the patient's wishes. [Time Spent: ___ minutes] : Time Spent: [unfilled] minutes [Audit-CScore] : 1 [de-identified] : Average [de-identified] : Average [facility]] : 9 [QOB9Mtiat] : 0 [AdvancecareDate] : 10/21

## 2023-08-07 ENCOUNTER — RX RENEWAL (OUTPATIENT)
Age: 63
End: 2023-08-07

## 2023-08-15 ENCOUNTER — APPOINTMENT (OUTPATIENT)
Dept: INFECTIOUS DISEASE | Facility: CLINIC | Age: 63
End: 2023-08-15
Payer: MEDICARE

## 2023-08-15 ENCOUNTER — NON-APPOINTMENT (OUTPATIENT)
Age: 63
End: 2023-08-15

## 2023-08-15 ENCOUNTER — LABORATORY RESULT (OUTPATIENT)
Age: 63
End: 2023-08-15

## 2023-08-15 VITALS
DIASTOLIC BLOOD PRESSURE: 68 MMHG | OXYGEN SATURATION: 97 % | TEMPERATURE: 98.2 F | HEART RATE: 73 BPM | BODY MASS INDEX: 22.53 KG/M2 | WEIGHT: 132 LBS | SYSTOLIC BLOOD PRESSURE: 118 MMHG | HEIGHT: 64 IN

## 2023-08-15 DIAGNOSIS — Z86.19 PERSONAL HISTORY OF OTHER INFECTIOUS AND PARASITIC DISEASES: ICD-10-CM

## 2023-08-15 PROCEDURE — 99205 OFFICE O/P NEW HI 60 MIN: CPT

## 2023-08-15 NOTE — HISTORY OF PRESENT ILLNESS
[FreeTextEntry1] : 62 Female, retired , referred by Dr. Kapoor for  evaluation of Syphilis.  Pt gives remote history of being treated for STI, thought to have been Syphilis.  No documentation available.   Pt has + Positive Treponemal Test with negative RPR  Pt has multitude of C/O from complex medical history.  Pt has been treated with recent Ivermectin for unknown reasons. Brain MRI  mild chronic small vessel ischemic changes,  ESR 6, Hep C Ab Neg, HIV Neg Hx Left DVT

## 2023-08-15 NOTE — REASON FOR VISIT
[Initial Evaluation] : an initial evaluation [FreeTextEntry1] : Patient here today to establish care.

## 2023-08-15 NOTE — ASSESSMENT
[FreeTextEntry1] : 62 Female with hx STI, treated 10 years ago.  Has Positive Treponemal AB and Neg RPR.  Repeat TPPA Pending. Suspect hx [id number] primary syphilis, doubt pt has neurosyphilis Multiple chronic medical issues including DVT  PLAN:  1. repeat syphilis evaluation, only method to check TTPA, FTA Not available             2.  encourage patient to stop taking ivermectin             3.  Phone F/U with patient in 2-3 days             4.  If treatment required for syphilis will ask health dept to arrange  D/W Dr. Kapoor [Treatment Education] : treatment education [Medical Care Issues] : medical care issues

## 2023-08-15 NOTE — REVIEW OF SYSTEMS
[Feeling Tired] : feeling tired [Feeling Sick] : feeling sick [Negative] : Heme/Lymph [Fever] : no fever [Chills] : no chills [Body Aches] : no body aches [Difficulty Sleeping] : no difficulty sleeping [Normal Appetite] : appetite not normal  [Eye Pain] : no eye pain [Red Eyes] : eyes not red [Eyesight Problems] : no eyesight problems [Discharge From Eyes] : no purulent discharge from the eyes [Earache] : no earache [Loss Of Hearing] : no hearing loss [Nasal Discharge] : no nasal discharge [Sore Throat] : no sore throat [Hoarseness] : no hoarseness [Chest Pain] : no chest pain [Palpitations] : no palpitations [Leg Claudication] : no intermittent leg claudication [Lower Ext Edema] : no lower extremity edema [Shortness Of Breath] : no shortness of breath [Wheezing] : no wheezing [Cough] : no cough [SOB on Exertion] : no shortness of breath during exertion [Sputum] : not coughing up ~M sputum [Pleuritic Chest Pain] : no pleuritic chest pain [Abdominal Pain] : no abdominal pain [Vomiting] : no vomiting [Constipation] : no constipation [Diarrhea] : no diarrhea [Dysuria] : no dysuria

## 2023-08-15 NOTE — PHYSICAL EXAM
[General Appearance - Alert] : alert [General Appearance - In No Acute Distress] : in no acute distress [General Appearance - Well Nourished] : well nourished [General Appearance - Well-Appearing] : healthy appearing [Sclera] : the sclera and conjunctiva were normal [PERRL With Normal Accommodation] : pupils were equal in size, round, reactive to light [Extraocular Movements] : extraocular movements were intact [Outer Ear] : the ears and nose were normal in appearance [Hearing Threshold Finger Rub Not [place]] : hearing was normal [Examination Of The Oral Cavity] : the lips and gums were normal [Oropharynx] : the oropharynx was normal with no thrush [Neck Appearance] : the appearance of the neck was normal [Neck Cervical Mass (___cm)] : no neck mass was observed [Jugular Venous Distention Increased] : there was no jugular-venous distention [Thyroid Diffuse Enlargement] : the thyroid was not enlarged [Respiration, Rhythm And Depth] : normal respiratory rhythm and effort [Exaggerated Use Of Accessory Muscles For Inspiration] : no accessory muscle use [Auscultation Breath Sounds / Voice Sounds] : lungs were clear to auscultation bilaterally [Heart Rate And Rhythm] : heart rate was normal and rhythm regular [Heart Sounds] : normal S1 and S2 [Heart Sounds Gallop] : no gallops [Murmurs] : no murmurs [Heart Sounds Pericardial Friction Rub] : no pericardial rub [Full Pulse] : the pedal pulses are present [Edema] : there was no peripheral edema [Bowel Sounds] : normal bowel sounds [Abdomen Soft] : soft [Abdomen Tenderness] : non-tender [Abdomen Mass (___ Cm)] : no abdominal mass palpated [Costovertebral Angle Tenderness] : no CVA tenderness [No Palpable Adenopathy] : no palpable adenopathy [Cervical Lymph Nodes Enlarged Posterior Bilaterally] : posterior cervical [Cervical Lymph Nodes Enlarged Anterior Bilaterally] : anterior cervical [Femoral Lymph Nodes Enlarged Bilaterally] : femoral [Inguinal Lymph Nodes Enlarged Bilaterally] : inguinal [Musculoskeletal - Swelling] : no joint swelling [Range of Motion to Joints] : range of motion to joints [Nail Clubbing] : no clubbing  or cyanosis of the fingernails [Motor Tone] : muscle strength and tone were normal [Skin Color & Pigmentation] : normal skin color and pigmentation [] : no rash [Skin Lesions] : no skin lesions [Cranial Nerves] : cranial nerves 2-12 were intact [Sensation] : the sensory exam was normal to light touch and pinprick [Motor Exam] : the motor exam was normal [No Focal Deficits] : no focal deficits [Oriented To Time, Place, And Person] : oriented to person, place, and time [Affect] : the affect was normal [FreeTextEntry1] : anxious

## 2023-08-16 ENCOUNTER — NON-APPOINTMENT (OUTPATIENT)
Age: 63
End: 2023-08-16

## 2023-08-17 LAB
ALBUMIN SERPL ELPH-MCNC: 4.8 G/DL
ALP BLD-CCNC: 104 U/L
ALT SERPL-CCNC: 16 U/L
ANION GAP SERPL CALC-SCNC: 11 MMOL/L
AST SERPL-CCNC: 16 U/L
BILIRUB SERPL-MCNC: 0.2 MG/DL
BUN SERPL-MCNC: 10 MG/DL
CALCIUM SERPL-MCNC: 10.1 MG/DL
CHLORIDE SERPL-SCNC: 105 MMOL/L
CO2 SERPL-SCNC: 28 MMOL/L
CREAT SERPL-MCNC: 0.97 MG/DL
CRP SERPL-MCNC: 3 MG/L
EGFR: 66 ML/MIN/1.73M2
ERYTHROCYTE [SEDIMENTATION RATE] IN BLOOD BY WESTERGREN METHOD: 10 MM/HR
GLUCOSE SERPL-MCNC: 103 MG/DL
POTASSIUM SERPL-SCNC: 4.1 MMOL/L
PROT SERPL-MCNC: 7.2 G/DL
SODIUM SERPL-SCNC: 143 MMOL/L
T PALLIDUM AB SER QL IA: POSITIVE

## 2023-08-25 ENCOUNTER — APPOINTMENT (OUTPATIENT)
Dept: INFECTIOUS DISEASE | Facility: CLINIC | Age: 63
End: 2023-08-25
Payer: MEDICARE

## 2023-08-25 ENCOUNTER — NON-APPOINTMENT (OUTPATIENT)
Age: 63
End: 2023-08-25

## 2023-08-25 VITALS
HEART RATE: 72 BPM | SYSTOLIC BLOOD PRESSURE: 108 MMHG | DIASTOLIC BLOOD PRESSURE: 62 MMHG | TEMPERATURE: 98.2 F | OXYGEN SATURATION: 97 %

## 2023-08-25 PROCEDURE — 99213 OFFICE O/P EST LOW 20 MIN: CPT | Mod: 25

## 2023-08-25 PROCEDURE — 96372 THER/PROPH/DIAG INJ SC/IM: CPT

## 2023-08-25 RX ADMIN — PENICILLIN G BENZATHINE 0 UNIT/2ML: 1200000 INJECTION, SUSPENSION INTRAMUSCULAR at 00:00

## 2023-08-25 NOTE — HISTORY OF PRESENT ILLNESS
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  Discussed with Dr. Antwon nielson [facility]. Pt is Neg for HIV, Hep C  Had Positive TPPA and Neg RPR ( tested for prozone  effect)

## 2023-08-25 NOTE — ASSESSMENT
[FreeTextEntry1] : 62 Female with Late Latent Syphilis for first  Bicillin treatment Pt will receive 3 weekly doses of Bicillin 2.4 MU IM HIV neg, Hep C Neg 8/15  TTPA  Positive,  RPR Negative [Treatment Education] : treatment education [Medical Care Issues] : medical care issues

## 2023-08-25 NOTE — PHYSICAL EXAM
[General Appearance - Alert] : alert [General Appearance - In No Acute Distress] : in no acute distress [General Appearance - Well Nourished] : well nourished [General Appearance - Well-Appearing] : healthy appearing [Sclera] : the sclera and conjunctiva were normal [PERRL With Normal Accommodation] : pupils were equal in size, round, reactive to light [Extraocular Movements] : extraocular movements were intact [Outer Ear] : the ears and nose were normal in appearance [Hearing Threshold Finger Rub Not [place]] : hearing was normal [Examination Of The Oral Cavity] : the lips and gums were normal [Oropharynx] : the oropharynx was normal with no thrush [Neck Appearance] : the appearance of the neck was normal [Neck Cervical Mass (___cm)] : no neck mass was observed [Jugular Venous Distention Increased] : there was no jugular-venous distention [Thyroid Diffuse Enlargement] : the thyroid was not enlarged [Respiration, Rhythm And Depth] : normal respiratory rhythm and effort [Exaggerated Use Of Accessory Muscles For Inspiration] : no accessory muscle use [Auscultation Breath Sounds / Voice Sounds] : lungs were clear to auscultation bilaterally [Heart Rate And Rhythm] : heart rate was normal and rhythm regular [Heart Sounds] : normal S1 and S2 [Heart Sounds Gallop] : no gallops [Murmurs] : no murmurs [Heart Sounds Pericardial Friction Rub] : no pericardial rub [Full Pulse] : the pedal pulses are present [Edema] : there was no peripheral edema [Bowel Sounds] : normal bowel sounds [Abdomen Soft] : soft [Abdomen Tenderness] : non-tender [Abdomen Mass (___ Cm)] : no abdominal mass palpated [Costovertebral Angle Tenderness] : no CVA tenderness [No Palpable Adenopathy] : no palpable adenopathy [Cervical Lymph Nodes Enlarged Posterior Bilaterally] : posterior cervical [Cervical Lymph Nodes Enlarged Anterior Bilaterally] : anterior cervical [Musculoskeletal - Swelling] : no joint swelling [Range of Motion to Joints] : range of motion to joints [Nail Clubbing] : no clubbing  or cyanosis of the fingernails [Motor Tone] : muscle strength and tone were normal [Skin Color & Pigmentation] : normal skin color and pigmentation [] : no rash [Skin Lesions] : no skin lesions [Cranial Nerves] : cranial nerves 2-12 were intact [Sensation] : the sensory exam was normal to light touch and pinprick [Motor Exam] : the motor exam was normal [No Focal Deficits] : no focal deficits [Oriented To Time, Place, And Person] : oriented to person, place, and time [Affect] : the affect was normal

## 2023-08-25 NOTE — REVIEW OF SYSTEMS
[Fever] : no fever [Chills] : no chills [Difficulty Sleeping] : no difficulty sleeping [Feeling Tired] : not feeling tired [Feeling Sick] : not feeling sick [Normal Appetite] : appetite not normal  [Eye Pain] : no eye pain [Red Eyes] : eyes not red [Eyesight Problems] : no eyesight problems [Discharge From Eyes] : no purulent discharge from the eyes [Earache] : no earache [Loss Of Hearing] : no hearing loss [Nasal Discharge] : no nasal discharge [Sore Throat] : no sore throat [Hoarseness] : no hoarseness [Chest Pain] : no chest pain [Palpitations] : no palpitations [Leg Claudication] : no intermittent leg claudication [Lower Ext Edema] : no lower extremity edema [Shortness Of Breath] : no shortness of breath [Wheezing] : no wheezing [Cough] : no cough [SOB on Exertion] : no shortness of breath during exertion [Sputum] : not coughing up ~M sputum [Pleuritic Chest Pain] : no pleuritic chest pain [Abdominal Pain] : no abdominal pain [Vomiting] : no vomiting [Constipation] : no constipation [Diarrhea] : no diarrhea [Dysuria] : no dysuria [Pelvic Pain] : no pelvic pain [Negative] : Heme/Lymph

## 2023-08-25 NOTE — REASON FOR VISIT
[Follow-Up: _____] : a [unfilled] follow-up visit [FreeTextEntry1] : Patient here today to receive series 1 of bicillin injections for the treatment of syphilis.

## 2023-08-28 ENCOUNTER — APPOINTMENT (OUTPATIENT)
Dept: INFECTIOUS DISEASE | Facility: CLINIC | Age: 63
End: 2023-08-28

## 2023-08-28 ENCOUNTER — NON-APPOINTMENT (OUTPATIENT)
Age: 63
End: 2023-08-28

## 2023-08-28 RX ORDER — PENICILLIN G BENZATHINE 1200000 [IU]/2ML
1200000 INJECTION, SUSPENSION INTRAMUSCULAR
Qty: 0 | Refills: 0 | Status: COMPLETED | OUTPATIENT
Start: 2023-08-25

## 2023-08-30 ENCOUNTER — APPOINTMENT (OUTPATIENT)
Dept: INFECTIOUS DISEASE | Facility: CLINIC | Age: 63
End: 2023-08-30

## 2023-09-01 ENCOUNTER — APPOINTMENT (OUTPATIENT)
Dept: INFECTIOUS DISEASE | Facility: CLINIC | Age: 63
End: 2023-09-01
Payer: MEDICARE

## 2023-09-01 VITALS
OXYGEN SATURATION: 100 % | SYSTOLIC BLOOD PRESSURE: 140 MMHG | WEIGHT: 131 LBS | HEART RATE: 98 BPM | BODY MASS INDEX: 22.36 KG/M2 | HEIGHT: 64 IN | DIASTOLIC BLOOD PRESSURE: 80 MMHG | TEMPERATURE: 98.1 F

## 2023-09-01 DIAGNOSIS — A53.9 SYPHILIS, UNSPECIFIED: ICD-10-CM

## 2023-09-01 PROCEDURE — 99214 OFFICE O/P EST MOD 30 MIN: CPT

## 2023-09-01 NOTE — ASSESSMENT
[FreeTextEntry1] : 63 Female with history undocumented treatment of Syphilis.    Positive treponemal AB and TTPA but negative RPR. Last week pt agreed to receive 3 weekly doses BIcilllin 2.4 MU IM and received her first dose without any complaint  Today pt is refusing any additional Bicillin therapy and is angry over failure to confirm and treat undiagnosed diffuse "parasitical"  infection.  Had full lab review of all Normal labs.  Pt refused additional Bicilllin therapy and left the office. Pt will follow with her primary physician.  Unable to reach Dr. Antwon fernandez on vacation, left message Spoke with [facility].

## 2023-09-01 NOTE — PHYSICAL EXAM
[General Appearance - Alert] : alert [General Appearance - In No Acute Distress] : in no acute distress [General Appearance - Well Nourished] : well nourished [General Appearance - Well-Appearing] : healthy appearing [Sclera] : the sclera and conjunctiva were normal [PERRL With Normal Accommodation] : pupils were equal in size, round, reactive to light [Extraocular Movements] : extraocular movements were intact [Outer Ear] : the ears and nose were normal in appearance [Hearing Threshold Finger Rub Not [place]] : hearing was normal [Examination Of The Oral Cavity] : the lips and gums were normal [Oropharynx] : the oropharynx was normal with no thrush [Neck Appearance] : the appearance of the neck was normal [Neck Cervical Mass (___cm)] : no neck mass was observed [Jugular Venous Distention Increased] : there was no jugular-venous distention [Thyroid Diffuse Enlargement] : the thyroid was not enlarged [Respiration, Rhythm And Depth] : normal respiratory rhythm and effort [Exaggerated Use Of Accessory Muscles For Inspiration] : no accessory muscle use [Auscultation Breath Sounds / Voice Sounds] : lungs were clear to auscultation bilaterally [Heart Sounds] : normal S1 and S2 [Heart Rate And Rhythm] : heart rate was normal and rhythm regular [Heart Sounds Gallop] : no gallops [Murmurs] : no murmurs [Heart Sounds Pericardial Friction Rub] : no pericardial rub [Full Pulse] : the pedal pulses are present [Edema] : there was no peripheral edema [Bowel Sounds] : normal bowel sounds [Abdomen Soft] : soft [Abdomen Tenderness] : non-tender [Costovertebral Angle Tenderness] : no CVA tenderness [Abdomen Mass (___ Cm)] : no abdominal mass palpated [No Palpable Adenopathy] : no palpable adenopathy [Cervical Lymph Nodes Enlarged Posterior Bilaterally] : posterior cervical [Cervical Lymph Nodes Enlarged Anterior Bilaterally] : anterior cervical [Musculoskeletal - Swelling] : no joint swelling [Range of Motion to Joints] : range of motion to joints [Nail Clubbing] : no clubbing  or cyanosis of the fingernails [Motor Tone] : muscle strength and tone were normal [] : no rash [Skin Color & Pigmentation] : normal skin color and pigmentation [Skin Lesions] : no skin lesions [Cranial Nerves] : cranial nerves 2-12 were intact [Sensation] : the sensory exam was normal to light touch and pinprick [Motor Exam] : the motor exam was normal [No Focal Deficits] : no focal deficits [Oriented To Time, Place, And Person] : oriented to person, place, and time [Affect] : the affect was normal

## 2023-09-01 NOTE — REVIEW OF SYSTEMS
[Fever] : no fever [Chills] : no chills [Body Aches] : no body aches [Difficulty Sleeping] : no difficulty sleeping [Feeling Tired] : not feeling tired [Feeling Sick] : not feeling sick [Normal Appetite] : appetite not normal  [Eye Pain] : no eye pain [Red Eyes] : eyes not red [Eyesight Problems] : no eyesight problems [Discharge From Eyes] : no purulent discharge from the eyes [Earache] : no earache [Loss Of Hearing] : no hearing loss [Nasal Discharge] : no nasal discharge [Sore Throat] : no sore throat [Hoarseness] : no hoarseness [Chest Pain] : no chest pain [Palpitations] : no palpitations [Leg Claudication] : no intermittent leg claudication [Lower Ext Edema] : no lower extremity edema [Shortness Of Breath] : no shortness of breath [Wheezing] : no wheezing [Cough] : no cough [SOB on Exertion] : no shortness of breath during exertion [Sputum] : not coughing up ~M sputum [Pleuritic Chest Pain] : no pleuritic chest pain [Abdominal Pain] : no abdominal pain [Vomiting] : no vomiting [Constipation] : no constipation [Diarrhea] : no diarrhea [Dysuria] : no dysuria [Skin Lesions] : no skin lesions [Skin Wound] : no skin wound [Negative] : Heme/Lymph

## 2023-09-01 NOTE — REASON FOR VISIT
[Follow-Up: _____] : a [unfilled] follow-up visit [FreeTextEntry1] : Patient here today for Pos. RPR treatment; 2nd round of Bicillin. Patient tolerating well.

## 2023-09-01 NOTE — HISTORY OF PRESENT ILLNESS
[FreeTextEntry1] : 63 Female  Treponemal AB Positive, + TTPA  As Instructed by [facility] pt was treated with Bicilloin 2.4 MU IN last week and scheduled for 2 additional doses.  Today pt is adamant that she does not need treatment for syphilis and refuses any additional Bicillin.  Pt had spoken with , who is naot available until Tues 9/5.  Pt claims she was infected with a "parasite" years ao when she stepped on a jellyfish/ man op war , "[employer]"  and claims to have suffered chronic abnormalities with her mouth, skin, and other systems.  Pt is S/P empiric anti-parasitic therapy with Ivermectin.  Extensive lab review 8/15/23:  WBC  6.8, No Eosinophilia<  Hgb  13.5,  PLT  267,  Creat  0.9,  Gluc 103,  Ca 10.1, CRP 3, ESR 10, RPR Neg, TTPA Positive  Pt frustrated that no parasitic diagnosis has been confirmed

## 2023-09-05 ENCOUNTER — APPOINTMENT (OUTPATIENT)
Dept: INTERNAL MEDICINE | Facility: CLINIC | Age: 63
End: 2023-09-05
Payer: MEDICARE

## 2023-09-05 DIAGNOSIS — R19.7 DIARRHEA, UNSPECIFIED: ICD-10-CM

## 2023-09-05 PROCEDURE — 99215 OFFICE O/P EST HI 40 MIN: CPT

## 2023-09-05 NOTE — ASSESSMENT
[FreeTextEntry1] : 63-year-old female presents for evaluation of parasitic infection  Rule out parasitic infection Multiple complaints about skin lesions Complaint of bug bite  Recommendation: Reviewed the chart since 2021 with the patient in great detail, TIA could be possible due to history of hyperlipidemia and hypertension, UTI for unclear reasons but patient should have a urological evaluation for this, questioned if so was due to antibiotics given to her at the hospital when she was admitted for her blood clot at [facility].  Unclear why she had parotid gland swelling which was in 2020 and has not returned since.  Reports she has been treated for syphilis in the past by her primary care physician and refused treatment by infectious diseases Dr Bruce last week.  Per his note he had notified the Department of Health. Patient reports many doctors have been ignoring her parasitic infection. Patient was also verbally abusive during the encounter, Staing 'men useless and not lasting long enough.' Offered patient extensive testing for her possible parasitic infection, ordered testing however prior to any testing to be performed or laboratory paperwork given to the patient. The patient left the office without seeking any paperwork for laboratory testing and outpatient lab for having the labs done here.

## 2023-09-05 NOTE — HISTORY OF PRESENT ILLNESS
[FreeTextEntry1] : 63-year-old female presents to the office for an evaluation for parasitic infection.  Patient reports back in the year 2020 she had started on an anemone plan in the ocean.  Since that time patient developed parotid gland swelling which is now since has been resolved.  This was followed by a urinary tract infection in 2020 and 2021.  Further followed by skin lesions on multiple sites of her body which have since been resolved.  She reports new skin lesions occurring intermittently since that time.  She has seen dermatology for the skin lesions and no conclusive diagnosis has been made.  She also had a blood clot for which she was hospitalized at [facility] and that hospital course was complicated with C. difficile.  After that C. difficile episode patient developed a TIA.  She reports ever since her encounter with the plant anemone and possible bug bite from the plant she has had these medical problems and has not been feeling well.  She has sought medical attention of multiple infectious disease physicians most recently seen last week Dr Bruce has also been diagnosed with syphilis for which she refuses penicillin shots.  She reports she was treated by her primary care physician Dr Kapoor and a doctor at [facility] as well for this.  She has seen infectious diseases Dr Gamboa in this office in 2021 as well for multiple skin complaints.  She has been treated with ivermectin and Diflucan for multiple weeks by her primary care physician Dr Kapoor.  She reports she feels better after the treatment but now again does not feel well.

## 2023-09-05 NOTE — PHYSICAL EXAM
[General Appearance - Alert] : alert [General Appearance - In No Acute Distress] : in no acute distress [Sclera] : the sclera and conjunctiva were normal [Oropharynx] : the oropharynx was normal with no thrush [Neck Appearance] : the appearance of the neck was normal [Exaggerated Use Of Accessory Muscles For Inspiration] : no accessory muscle use [Edema] : there was no peripheral edema [Abdomen Tenderness] : non-tender [Costovertebral Angle Tenderness] : no CVA tenderness [Range of Motion to Joints] : range of motion to joints [Motor Exam] : the motor exam was normal [] : no rash [No Focal Deficits] : no focal deficits [FreeTextEntry1] : Anxious

## 2023-09-06 ENCOUNTER — EMERGENCY (EMERGENCY)
Facility: HOSPITAL | Age: 63
LOS: 1 days | Discharge: HOME | End: 2023-09-06
Attending: EMERGENCY MEDICINE | Admitting: EMERGENCY MEDICINE
Payer: MEDICARE

## 2023-09-06 VITALS
OXYGEN SATURATION: 100 % | DIASTOLIC BLOOD PRESSURE: 64 MMHG | RESPIRATION RATE: 16 BRPM | HEART RATE: 80 BPM | SYSTOLIC BLOOD PRESSURE: 112 MMHG | TEMPERATURE: 98 F

## 2023-09-06 DIAGNOSIS — Z98.890 OTHER SPECIFIED POSTPROCEDURAL STATES: Chronic | ICD-10-CM

## 2023-09-06 DIAGNOSIS — Z87.81 PERSONAL HISTORY OF (HEALED) TRAUMATIC FRACTURE: Chronic | ICD-10-CM

## 2023-09-06 LAB
ALBUMIN SERPL ELPH-MCNC: 4.5 G/DL — SIGNIFICANT CHANGE UP (ref 3.3–5)
ALP SERPL-CCNC: 102 U/L — SIGNIFICANT CHANGE UP (ref 40–120)
ALT FLD-CCNC: 86 U/L — HIGH (ref 4–33)
ANION GAP SERPL CALC-SCNC: 12 MMOL/L — SIGNIFICANT CHANGE UP (ref 7–14)
APTT BLD: 30.7 SEC — SIGNIFICANT CHANGE UP (ref 24.5–35.6)
AST SERPL-CCNC: 58 U/L — HIGH (ref 4–32)
BASE EXCESS BLDV CALC-SCNC: -1.7 MMOL/L — SIGNIFICANT CHANGE UP (ref -2–3)
BASOPHILS # BLD AUTO: 0.06 K/UL — SIGNIFICANT CHANGE UP (ref 0–0.2)
BASOPHILS NFR BLD AUTO: 0.7 % — SIGNIFICANT CHANGE UP (ref 0–2)
BILIRUB SERPL-MCNC: 0.5 MG/DL — SIGNIFICANT CHANGE UP (ref 0.2–1.2)
BLOOD GAS VENOUS COMPREHENSIVE RESULT: SIGNIFICANT CHANGE UP
BUN SERPL-MCNC: 25 MG/DL — HIGH (ref 7–23)
CALCIUM SERPL-MCNC: 9.9 MG/DL — SIGNIFICANT CHANGE UP (ref 8.4–10.5)
CHLORIDE BLDV-SCNC: 103 MMOL/L — SIGNIFICANT CHANGE UP (ref 96–108)
CHLORIDE SERPL-SCNC: 105 MMOL/L — SIGNIFICANT CHANGE UP (ref 98–107)
CO2 BLDV-SCNC: 26.7 MMOL/L — HIGH (ref 22–26)
CO2 SERPL-SCNC: 23 MMOL/L — SIGNIFICANT CHANGE UP (ref 22–31)
CREAT SERPL-MCNC: 1.31 MG/DL — HIGH (ref 0.5–1.3)
EGFR: 46 ML/MIN/1.73M2 — LOW
EOSINOPHIL # BLD AUTO: 0.13 K/UL — SIGNIFICANT CHANGE UP (ref 0–0.5)
EOSINOPHIL NFR BLD AUTO: 1.6 % — SIGNIFICANT CHANGE UP (ref 0–6)
GAS PNL BLDV: 137 MMOL/L — SIGNIFICANT CHANGE UP (ref 136–145)
GLUCOSE BLDV-MCNC: 95 MG/DL — SIGNIFICANT CHANGE UP (ref 70–99)
GLUCOSE SERPL-MCNC: 99 MG/DL — SIGNIFICANT CHANGE UP (ref 70–99)
HCO3 BLDV-SCNC: 25 MMOL/L — SIGNIFICANT CHANGE UP (ref 22–29)
HCT VFR BLD CALC: 41 % — SIGNIFICANT CHANGE UP (ref 34.5–45)
HCT VFR BLDA CALC: 42 % — SIGNIFICANT CHANGE UP (ref 34.5–46.5)
HGB BLD CALC-MCNC: 13.9 G/DL — SIGNIFICANT CHANGE UP (ref 11.7–16.1)
HGB BLD-MCNC: 13.8 G/DL — SIGNIFICANT CHANGE UP (ref 11.5–15.5)
IANC: 3.98 K/UL — SIGNIFICANT CHANGE UP (ref 1.8–7.4)
IMM GRANULOCYTES NFR BLD AUTO: 0.1 % — SIGNIFICANT CHANGE UP (ref 0–0.9)
INR BLD: 1.28 RATIO — HIGH (ref 0.85–1.18)
LACTATE BLDV-MCNC: 1.3 MMOL/L — SIGNIFICANT CHANGE UP (ref 0.5–2)
LYMPHOCYTES # BLD AUTO: 3.2 K/UL — SIGNIFICANT CHANGE UP (ref 1–3.3)
LYMPHOCYTES # BLD AUTO: 40 % — SIGNIFICANT CHANGE UP (ref 13–44)
MCHC RBC-ENTMCNC: 30.9 PG — SIGNIFICANT CHANGE UP (ref 27–34)
MCHC RBC-ENTMCNC: 33.7 GM/DL — SIGNIFICANT CHANGE UP (ref 32–36)
MCV RBC AUTO: 91.9 FL — SIGNIFICANT CHANGE UP (ref 80–100)
MONOCYTES # BLD AUTO: 0.63 K/UL — SIGNIFICANT CHANGE UP (ref 0–0.9)
MONOCYTES NFR BLD AUTO: 7.9 % — SIGNIFICANT CHANGE UP (ref 2–14)
NEUTROPHILS # BLD AUTO: 3.98 K/UL — SIGNIFICANT CHANGE UP (ref 1.8–7.4)
NEUTROPHILS NFR BLD AUTO: 49.7 % — SIGNIFICANT CHANGE UP (ref 43–77)
NRBC # BLD: 0 /100 WBCS — SIGNIFICANT CHANGE UP (ref 0–0)
NRBC # FLD: 0 K/UL — SIGNIFICANT CHANGE UP (ref 0–0)
PCO2 BLDV: 50 MMHG — SIGNIFICANT CHANGE UP (ref 39–52)
PH BLDV: 7.31 — LOW (ref 7.32–7.43)
PLATELET # BLD AUTO: 283 K/UL — SIGNIFICANT CHANGE UP (ref 150–400)
PO2 BLDV: 22 MMHG — LOW (ref 25–45)
POTASSIUM BLDV-SCNC: 3.5 MMOL/L — SIGNIFICANT CHANGE UP (ref 3.5–5.1)
POTASSIUM SERPL-MCNC: 3.8 MMOL/L — SIGNIFICANT CHANGE UP (ref 3.5–5.3)
POTASSIUM SERPL-SCNC: 3.8 MMOL/L — SIGNIFICANT CHANGE UP (ref 3.5–5.3)
PROT SERPL-MCNC: 7.5 G/DL — SIGNIFICANT CHANGE UP (ref 6–8.3)
PROTHROM AB SERPL-ACNC: 14.4 SEC — HIGH (ref 9.5–13)
RBC # BLD: 4.46 M/UL — SIGNIFICANT CHANGE UP (ref 3.8–5.2)
RBC # FLD: 12.8 % — SIGNIFICANT CHANGE UP (ref 10.3–14.5)
SAO2 % BLDV: 18.2 % — LOW (ref 67–88)
SODIUM SERPL-SCNC: 140 MMOL/L — SIGNIFICANT CHANGE UP (ref 135–145)
WBC # BLD: 8.01 K/UL — SIGNIFICANT CHANGE UP (ref 3.8–10.5)
WBC # FLD AUTO: 8.01 K/UL — SIGNIFICANT CHANGE UP (ref 3.8–10.5)

## 2023-09-06 PROCEDURE — 99285 EMERGENCY DEPT VISIT HI MDM: CPT | Mod: FS

## 2023-09-06 PROCEDURE — 74177 CT ABD & PELVIS W/CONTRAST: CPT | Mod: 26,MA

## 2023-09-06 RX ORDER — ACETAMINOPHEN 500 MG
1000 TABLET ORAL ONCE
Refills: 0 | Status: COMPLETED | OUTPATIENT
Start: 2023-09-06 | End: 2023-09-06

## 2023-09-06 RX ORDER — SODIUM CHLORIDE 9 MG/ML
1000 INJECTION INTRAMUSCULAR; INTRAVENOUS; SUBCUTANEOUS ONCE
Refills: 0 | Status: COMPLETED | OUTPATIENT
Start: 2023-09-06 | End: 2023-09-06

## 2023-09-06 RX ADMIN — SODIUM CHLORIDE 1000 MILLILITER(S): 9 INJECTION INTRAMUSCULAR; INTRAVENOUS; SUBCUTANEOUS at 22:12

## 2023-09-06 RX ADMIN — Medication 400 MILLIGRAM(S): at 22:18

## 2023-09-06 NOTE — ED PROVIDER NOTE - NS ED ATTENDING STATEMENT MOD
This was a shared visit with the KANDACE. I reviewed and verified the documentation and independently performed the documented:

## 2023-09-06 NOTE — ED ADULT NURSE NOTE - NSFALLUNIVINTERV_ED_ALL_ED
Bed/Stretcher in lowest position, wheels locked, appropriate side rails in place/Call bell, personal items and telephone in reach/Instruct patient to call for assistance before getting out of bed/chair/stretcher/Non-slip footwear applied when patient is off stretcher/Clear to call system/Physically safe environment - no spills, clutter or unnecessary equipment/Purposeful proactive rounding/Room/bathroom lighting operational, light cord in reach

## 2023-09-06 NOTE — ED PROVIDER NOTE - OBJECTIVE STATEMENT
63-year-old female with PMH of of C. 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 Pt describes left flank pain radiating to the LLQ and suprapubic area, constant, has worsened over the last days. Reports associated dysuria and urinary frequency. Denies vomiting or diarrhea, constipation, melena, hematochezia, denies fevers or chills. Denies any other complaints. She states she is seeing multiple specialists including GI and ID but has not yet "found an answer".

## 2023-09-06 NOTE — ED PROVIDER NOTE - ATTENDING APP SHARED VISIT CONTRIBUTION OF CARE
63-year-old female with history of C. difficile, aortic thrombus on Eliquis, hyperlipidemia, presenting to ER for over 1 year of left flank pain with radiation to the left lower quadrant, worsening over the last 1 week about with dysuria.  No vomiting or diarrhea, no fevers.  States she was supposed to have a colonoscopy and endoscopy but has not yet been able to schedule the procedures.  Patient additionally states that she has had scattered intermittent enlarged lymph nodes around body and neck, groin, and other areas for which she is seeing infectious disease with unclear etiology.    Exam   abdomen soft, nondistended, mild tenderness in left lower quadrant with mild CVA tenderness on left  No rash to site  Patient ambulatory    Assessment/plan  Acute on chronic pain, possibly MSK etiology, diverticulitis/colitis, pyelonephritis, renal stone, or other  We will get CT of abdomen/pelvis with IV contrast, UA, labs, pain meds, IV fluids  Dispo pending results

## 2023-09-06 NOTE — ED PROVIDER NOTE - PROGRESS NOTE DETAILS
pt signed out to me, p/w L flank pain x 1 year. labs wnl, ct a/p revealing enterocolitis, pending UA. UA negative. will dc w/ outpatient GI f/u, and ID as per patients request. return precautions discussed. do not suspect colitis clinically given normal BM's.

## 2023-09-06 NOTE — ED PROVIDER NOTE - NSFOLLOWUPINSTRUCTIONS_ED_ALL_ED_FT
You were seen in our department for left flank pain  Follow up with your PMD in 48-72 hours for further monitoring.  Follow up with outpatient Gastroenterologist as well as outpatient infectious disease clinic.  if you develop any chest pain, dizziness, high fevers, weakness, numbness, tingling, vision changes, or any worsening symptoms return to our ED for evaluation.

## 2023-09-06 NOTE — ED ADULT NURSE NOTE - OBJECTIVE STATEMENT
patient alert and oriented x4 ambulatory, c/o left flank pain x1 week with urinary frequency. patient is well appearing, no acute distress noted, pending provider orders at this time.

## 2023-09-06 NOTE — ED PROVIDER NOTE - PATIENT PORTAL LINK FT
You can access the FollowMyHealth Patient Portal offered by [facility] by registering at the following website: [email or web address]. By joining [employer]’s FollowMyHealth portal, you will also be able to view your health information using other applications (apps) compatible with our system.

## 2023-09-06 NOTE — ED ADULT TRIAGE NOTE - CHIEF COMPLAINT QUOTE
Pt c/o left flank pain X 1 week. Denies N/V, fevers, chills. Endorsing urinary frequency. Hx of HLD, neck fusion, neuropathy, anxiety

## 2023-09-06 NOTE — ED PROVIDER NOTE - NSFOLLOWUPCLINICS_GEN_ALL_ED_FT
[facility] Gastroenterology  Gastroenterology  [address] [place] [id number]  Phone: [phone]  Fax:     [facility] - Infectious Disease  Infectious Disease  [address] [place] [address], [place] [address]  Phone: [phone]  Fax:

## 2023-09-06 NOTE — ED PROVIDER NOTE - CLINICAL SUMMARY MEDICAL DECISION MAKING FREE TEXT BOX
63-year-old female with PMH of of C. 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 HD stable. Abdomen exam benign. Imp: Abdominal pain, r/o an acute intraabdominal pathology. Plan for labs, CT A/P, UA, reassess.

## 2023-09-07 ENCOUNTER — APPOINTMENT (OUTPATIENT)
Dept: RHEUMATOLOGY | Facility: CLINIC | Age: 63
End: 2023-09-07

## 2023-09-07 VITALS
HEART RATE: 62 BPM | SYSTOLIC BLOOD PRESSURE: 139 MMHG | RESPIRATION RATE: 17 BRPM | DIASTOLIC BLOOD PRESSURE: 78 MMHG | TEMPERATURE: 98 F | OXYGEN SATURATION: 100 %

## 2023-09-07 LAB
APPEARANCE UR: CLEAR — SIGNIFICANT CHANGE UP
BACTERIA # UR AUTO: NEGATIVE /HPF — SIGNIFICANT CHANGE UP
BILIRUB UR-MCNC: NEGATIVE — SIGNIFICANT CHANGE UP
CAST: 1 /LPF — SIGNIFICANT CHANGE UP (ref 0–4)
COLOR SPEC: YELLOW — SIGNIFICANT CHANGE UP
DIFF PNL FLD: NEGATIVE — SIGNIFICANT CHANGE UP
GLUCOSE UR QL: NEGATIVE MG/DL — SIGNIFICANT CHANGE UP
KETONES UR-MCNC: NEGATIVE MG/DL — SIGNIFICANT CHANGE UP
LEUKOCYTE ESTERASE UR-ACNC: ABNORMAL
NITRITE UR-MCNC: NEGATIVE — SIGNIFICANT CHANGE UP
PH UR: 6 — SIGNIFICANT CHANGE UP (ref 5–8)
PROT UR-MCNC: NEGATIVE MG/DL — SIGNIFICANT CHANGE UP
RBC CASTS # UR COMP ASSIST: 0 /HPF — SIGNIFICANT CHANGE UP (ref 0–4)
SP GR SPEC: 1.02 — SIGNIFICANT CHANGE UP (ref 1–1.03)
SQUAMOUS # UR AUTO: 1 /HPF — SIGNIFICANT CHANGE UP (ref 0–5)
UROBILINOGEN FLD QL: 0.2 MG/DL — SIGNIFICANT CHANGE UP (ref 0.2–1)
WBC UR QL: 2 /HPF — SIGNIFICANT CHANGE UP (ref 0–5)

## 2023-09-08 ENCOUNTER — NON-APPOINTMENT (OUTPATIENT)
Age: 63
End: 2023-09-08

## 2023-09-08 ENCOUNTER — APPOINTMENT (OUTPATIENT)
Dept: INFECTIOUS DISEASE | Facility: CLINIC | Age: 63
End: 2023-09-08

## 2023-09-21 ENCOUNTER — LABORATORY RESULT (OUTPATIENT)
Age: 63
End: 2023-09-21

## 2023-09-21 ENCOUNTER — APPOINTMENT (OUTPATIENT)
Dept: INFECTIOUS DISEASE | Facility: CLINIC | Age: 63
End: 2023-09-21
Payer: MEDICARE

## 2023-09-21 VITALS
WEIGHT: 129 LBS | OXYGEN SATURATION: 98 % | TEMPERATURE: 97.6 F | HEIGHT: 64 IN | HEART RATE: 101 BPM | SYSTOLIC BLOOD PRESSURE: 114 MMHG | DIASTOLIC BLOOD PRESSURE: 76 MMHG | BODY MASS INDEX: 22.02 KG/M2

## 2023-09-21 PROCEDURE — 99215 OFFICE O/P EST HI 40 MIN: CPT

## 2023-09-25 LAB
DEPRECATED O AND P PREP STL: NORMAL
G LAMBLIA AG STL QL: NORMAL
GI PCR PANEL: NOT DETECTED

## 2023-09-27 ENCOUNTER — LABORATORY RESULT (OUTPATIENT)
Age: 63
End: 2023-09-27

## 2023-10-03 ENCOUNTER — APPOINTMENT (OUTPATIENT)
Dept: INTERNAL MEDICINE | Facility: CLINIC | Age: 63
End: 2023-10-03

## 2023-10-03 LAB — BACTERIA STL CULT: NORMAL

## 2023-10-06 ENCOUNTER — APPOINTMENT (OUTPATIENT)
Dept: ORTHOPEDIC SURGERY | Facility: CLINIC | Age: 63
End: 2023-10-06
Payer: MEDICARE

## 2023-10-06 VITALS — WEIGHT: 129 LBS | HEIGHT: 64 IN | BODY MASS INDEX: 22.02 KG/M2

## 2023-10-06 DIAGNOSIS — T63.691A: ICD-10-CM

## 2023-10-06 DIAGNOSIS — S90.852A SUPERFICIAL FOREIGN BODY, LEFT FOOT, INITIAL ENCOUNTER: ICD-10-CM

## 2023-10-06 DIAGNOSIS — S90.851A SUPERFICIAL FOREIGN BODY, RIGHT FOOT, INITIAL ENCOUNTER: ICD-10-CM

## 2023-10-06 PROCEDURE — 99204 OFFICE O/P NEW MOD 45 MIN: CPT

## 2023-10-06 PROCEDURE — 73630 X-RAY EXAM OF FOOT: CPT | Mod: RT

## 2023-10-07 ENCOUNTER — APPOINTMENT (OUTPATIENT)
Dept: MRI IMAGING | Facility: CLINIC | Age: 63
End: 2023-10-07
Payer: MEDICARE

## 2023-10-07 PROCEDURE — 73718 MRI LOWER EXTREMITY W/O DYE: CPT | Mod: RT,MH

## 2023-10-07 PROCEDURE — 73221 MRI JOINT UPR EXTREM W/O DYE: CPT | Mod: MH

## 2023-10-08 ENCOUNTER — APPOINTMENT (OUTPATIENT)
Dept: ORTHOPEDIC SURGERY | Facility: CLINIC | Age: 63
End: 2023-10-08
Payer: MEDICARE

## 2023-10-08 PROCEDURE — 73610 X-RAY EXAM OF ANKLE: CPT | Mod: RT

## 2023-10-08 PROCEDURE — 99213 OFFICE O/P EST LOW 20 MIN: CPT

## 2023-10-10 ENCOUNTER — APPOINTMENT (OUTPATIENT)
Dept: FAMILY MEDICINE | Facility: CLINIC | Age: 63
End: 2023-10-10
Payer: MEDICARE

## 2023-10-10 VITALS
HEIGHT: 64 IN | BODY MASS INDEX: 20.49 KG/M2 | SYSTOLIC BLOOD PRESSURE: 132 MMHG | WEIGHT: 120 LBS | HEART RATE: 96 BPM | OXYGEN SATURATION: 98 % | RESPIRATION RATE: 16 BRPM | TEMPERATURE: 98.1 F | DIASTOLIC BLOOD PRESSURE: 66 MMHG

## 2023-10-10 DIAGNOSIS — L50.8 OTHER URTICARIA: ICD-10-CM

## 2023-10-10 DIAGNOSIS — R90.82 WHITE MATTER DISEASE, UNSPECIFIED: ICD-10-CM

## 2023-10-10 PROCEDURE — 99215 OFFICE O/P EST HI 40 MIN: CPT

## 2023-10-10 RX ORDER — ESOMEPRAZOLE MAGNESIUM 40 MG/1
40 CAPSULE, DELAYED RELEASE ORAL
Qty: 180 | Refills: 0 | Status: COMPLETED | COMMUNITY
Start: 2022-11-21 | End: 2023-10-10

## 2023-10-10 RX ORDER — TRIAMCINOLONE ACETONIDE 1 MG/G
0.1 CREAM TOPICAL
Qty: 80 | Refills: 0 | Status: ACTIVE | COMMUNITY
Start: 2023-09-20

## 2023-10-12 ENCOUNTER — APPOINTMENT (OUTPATIENT)
Dept: ORTHOPEDIC SURGERY | Facility: CLINIC | Age: 63
End: 2023-10-12
Payer: MEDICARE

## 2023-10-12 VITALS — HEIGHT: 64 IN | BODY MASS INDEX: 20.49 KG/M2 | WEIGHT: 120 LBS

## 2023-10-12 DIAGNOSIS — M19.171 POST-TRAUMATIC OSTEOARTHRITIS, RIGHT ANKLE AND FOOT: ICD-10-CM

## 2023-10-12 DIAGNOSIS — Z78.9 OTHER SPECIFIED HEALTH STATUS: ICD-10-CM

## 2023-10-12 PROCEDURE — 99214 OFFICE O/P EST MOD 30 MIN: CPT

## 2023-10-16 ENCOUNTER — NON-APPOINTMENT (OUTPATIENT)
Age: 63
End: 2023-10-16

## 2023-10-17 ENCOUNTER — APPOINTMENT (OUTPATIENT)
Dept: NEPHROLOGY | Facility: CLINIC | Age: 63
End: 2023-10-17

## 2023-10-18 ENCOUNTER — APPOINTMENT (OUTPATIENT)
Dept: INFECTIOUS DISEASE | Facility: CLINIC | Age: 63
End: 2023-10-18
Payer: MEDICARE

## 2023-10-18 VITALS
DIASTOLIC BLOOD PRESSURE: 66 MMHG | OXYGEN SATURATION: 97 % | BODY MASS INDEX: 21.85 KG/M2 | WEIGHT: 128 LBS | TEMPERATURE: 97.8 F | HEART RATE: 96 BPM | HEIGHT: 64 IN | SYSTOLIC BLOOD PRESSURE: 109 MMHG

## 2023-10-18 PROCEDURE — 99213 OFFICE O/P EST LOW 20 MIN: CPT

## 2023-10-26 PROBLEM — R90.82 WHITE MATTER ABNORMALITY ON MRI OF BRAIN: Status: ACTIVE | Noted: 2023-08-03

## 2023-10-26 PROBLEM — L50.8 URTICARIA, CHRONIC: Status: ACTIVE | Noted: 2022-04-28

## 2023-10-30 ENCOUNTER — RX RENEWAL (OUTPATIENT)
Age: 63
End: 2023-10-30

## 2023-11-08 ENCOUNTER — NON-APPOINTMENT (OUTPATIENT)
Age: 63
End: 2023-11-08

## 2023-11-08 DIAGNOSIS — G89.4 CHRONIC PAIN SYNDROME: ICD-10-CM

## 2023-11-08 DIAGNOSIS — R23.3 SPONTANEOUS ECCHYMOSES: ICD-10-CM

## 2023-11-08 DIAGNOSIS — R10.11 RIGHT UPPER QUADRANT PAIN: ICD-10-CM

## 2023-11-09 ENCOUNTER — LABORATORY RESULT (OUTPATIENT)
Age: 63
End: 2023-11-09

## 2023-11-13 ENCOUNTER — APPOINTMENT (OUTPATIENT)
Dept: FAMILY MEDICINE | Facility: CLINIC | Age: 63
End: 2023-11-13

## 2023-11-13 LAB
APPEARANCE: ABNORMAL
BILIRUBIN URINE: NEGATIVE
BLOOD URINE: NEGATIVE
COLOR: YELLOW
GLUCOSE QUALITATIVE U: NEGATIVE MG/DL
KETONES URINE: NEGATIVE MG/DL
LEUKOCYTE ESTERASE URINE: NEGATIVE
NITRITE URINE: NEGATIVE
PH URINE: 6
PROTEIN URINE: 30 MG/DL
SPECIFIC GRAVITY URINE: 1.02
UROBILINOGEN URINE: 1 MG/DL

## 2023-11-14 DIAGNOSIS — H53.2 DIPLOPIA: ICD-10-CM

## 2023-11-24 ENCOUNTER — APPOINTMENT (OUTPATIENT)
Dept: INTERNAL MEDICINE | Facility: CLINIC | Age: 63
End: 2023-11-24

## 2023-12-12 ENCOUNTER — APPOINTMENT (OUTPATIENT)
Dept: PULMONOLOGY | Facility: CLINIC | Age: 63
End: 2023-12-12
Payer: MEDICARE

## 2023-12-12 VITALS
TEMPERATURE: 97.1 F | SYSTOLIC BLOOD PRESSURE: 145 MMHG | OXYGEN SATURATION: 96 % | DIASTOLIC BLOOD PRESSURE: 72 MMHG | BODY MASS INDEX: 21.34 KG/M2 | WEIGHT: 125 LBS | HEART RATE: 73 BPM | HEIGHT: 64 IN

## 2023-12-12 DIAGNOSIS — J32.9 CHRONIC SINUSITIS, UNSPECIFIED: ICD-10-CM

## 2023-12-12 PROCEDURE — 99214 OFFICE O/P EST MOD 30 MIN: CPT

## 2023-12-18 ENCOUNTER — INPATIENT (INPATIENT)
Facility: HOSPITAL | Age: 63
LOS: 2 days | Discharge: HOME | DRG: 189 | End: 2023-12-21
Attending: INTERNAL MEDICINE | Admitting: STUDENT IN AN ORGANIZED HEALTH CARE EDUCATION/TRAINING PROGRAM
Payer: MEDICARE

## 2023-12-18 VITALS
SYSTOLIC BLOOD PRESSURE: 129 MMHG | WEIGHT: 126.77 LBS | DIASTOLIC BLOOD PRESSURE: 73 MMHG | RESPIRATION RATE: 18 BRPM | HEIGHT: 64 IN | OXYGEN SATURATION: 88 % | HEART RATE: 87 BPM | TEMPERATURE: 99 F

## 2023-12-18 DIAGNOSIS — Z98.890 OTHER SPECIFIED POSTPROCEDURAL STATES: Chronic | ICD-10-CM

## 2023-12-18 DIAGNOSIS — Z87.81 PERSONAL HISTORY OF (HEALED) TRAUMATIC FRACTURE: Chronic | ICD-10-CM

## 2023-12-18 LAB
ALBUMIN SERPL ELPH-MCNC: 3.4 G/DL — SIGNIFICANT CHANGE UP (ref 3.3–5.2)
ALBUMIN SERPL ELPH-MCNC: 3.4 G/DL — SIGNIFICANT CHANGE UP (ref 3.3–5.2)
ALP SERPL-CCNC: 76 U/L — SIGNIFICANT CHANGE UP (ref 40–120)
ALP SERPL-CCNC: 76 U/L — SIGNIFICANT CHANGE UP (ref 40–120)
ALT FLD-CCNC: 17 U/L — SIGNIFICANT CHANGE UP
ALT FLD-CCNC: 17 U/L — SIGNIFICANT CHANGE UP
ANION GAP SERPL CALC-SCNC: 11 MMOL/L — SIGNIFICANT CHANGE UP (ref 5–17)
ANION GAP SERPL CALC-SCNC: 11 MMOL/L — SIGNIFICANT CHANGE UP (ref 5–17)
ANISOCYTOSIS BLD QL: SLIGHT — SIGNIFICANT CHANGE UP
ANISOCYTOSIS BLD QL: SLIGHT — SIGNIFICANT CHANGE UP
APTT BLD: 25.4 SEC — SIGNIFICANT CHANGE UP (ref 24.5–35.6)
APTT BLD: 25.4 SEC — SIGNIFICANT CHANGE UP (ref 24.5–35.6)
AST SERPL-CCNC: 22 U/L — SIGNIFICANT CHANGE UP
AST SERPL-CCNC: 22 U/L — SIGNIFICANT CHANGE UP
BASE EXCESS BLDA CALC-SCNC: 8.9 MMOL/L — HIGH (ref -2–3)
BASE EXCESS BLDA CALC-SCNC: 8.9 MMOL/L — HIGH (ref -2–3)
BASOPHILS # BLD AUTO: 0.02 K/UL — SIGNIFICANT CHANGE UP (ref 0–0.2)
BASOPHILS # BLD AUTO: 0.02 K/UL — SIGNIFICANT CHANGE UP (ref 0–0.2)
BASOPHILS NFR BLD AUTO: 0.2 % — SIGNIFICANT CHANGE UP (ref 0–2)
BASOPHILS NFR BLD AUTO: 0.2 % — SIGNIFICANT CHANGE UP (ref 0–2)
BILIRUB SERPL-MCNC: 0.7 MG/DL — SIGNIFICANT CHANGE UP (ref 0.4–2)
BILIRUB SERPL-MCNC: 0.7 MG/DL — SIGNIFICANT CHANGE UP (ref 0.4–2)
BLD GP AB SCN SERPL QL: SIGNIFICANT CHANGE UP
BLD GP AB SCN SERPL QL: SIGNIFICANT CHANGE UP
BLOOD GAS COMMENTS ARTERIAL: SIGNIFICANT CHANGE UP
BLOOD GAS COMMENTS ARTERIAL: SIGNIFICANT CHANGE UP
BUN SERPL-MCNC: 21.4 MG/DL — HIGH (ref 8–20)
BUN SERPL-MCNC: 21.4 MG/DL — HIGH (ref 8–20)
CALCIUM SERPL-MCNC: 9 MG/DL — SIGNIFICANT CHANGE UP (ref 8.4–10.5)
CALCIUM SERPL-MCNC: 9 MG/DL — SIGNIFICANT CHANGE UP (ref 8.4–10.5)
CHLORIDE SERPL-SCNC: 89 MMOL/L — LOW (ref 96–108)
CHLORIDE SERPL-SCNC: 89 MMOL/L — LOW (ref 96–108)
CK SERPL-CCNC: 64 U/L — SIGNIFICANT CHANGE UP (ref 25–170)
CK SERPL-CCNC: 64 U/L — SIGNIFICANT CHANGE UP (ref 25–170)
CO2 SERPL-SCNC: 33 MMOL/L — HIGH (ref 22–29)
CO2 SERPL-SCNC: 33 MMOL/L — HIGH (ref 22–29)
CREAT SERPL-MCNC: 0.96 MG/DL — SIGNIFICANT CHANGE UP (ref 0.5–1.3)
CREAT SERPL-MCNC: 0.96 MG/DL — SIGNIFICANT CHANGE UP (ref 0.5–1.3)
EGFR: 66 ML/MIN/1.73M2 — SIGNIFICANT CHANGE UP
EGFR: 66 ML/MIN/1.73M2 — SIGNIFICANT CHANGE UP
EOSINOPHIL # BLD AUTO: 0.36 K/UL — SIGNIFICANT CHANGE UP (ref 0–0.5)
EOSINOPHIL # BLD AUTO: 0.36 K/UL — SIGNIFICANT CHANGE UP (ref 0–0.5)
EOSINOPHIL NFR BLD AUTO: 3.7 % — SIGNIFICANT CHANGE UP (ref 0–6)
EOSINOPHIL NFR BLD AUTO: 3.7 % — SIGNIFICANT CHANGE UP (ref 0–6)
GAS PNL BLDA: SIGNIFICANT CHANGE UP
GAS PNL BLDA: SIGNIFICANT CHANGE UP
GLUCOSE SERPL-MCNC: 112 MG/DL — HIGH (ref 70–99)
GLUCOSE SERPL-MCNC: 112 MG/DL — HIGH (ref 70–99)
HCO3 BLDA-SCNC: 32 MMOL/L — HIGH (ref 21–28)
HCO3 BLDA-SCNC: 32 MMOL/L — HIGH (ref 21–28)
HCT VFR BLD CALC: 33.3 % — LOW (ref 34.5–45)
HCT VFR BLD CALC: 33.3 % — LOW (ref 34.5–45)
HGB BLD-MCNC: 11.5 G/DL — SIGNIFICANT CHANGE UP (ref 11.5–15.5)
HGB BLD-MCNC: 11.5 G/DL — SIGNIFICANT CHANGE UP (ref 11.5–15.5)
HOROWITZ INDEX BLDA+IHG-RTO: SIGNIFICANT CHANGE UP
HOROWITZ INDEX BLDA+IHG-RTO: SIGNIFICANT CHANGE UP
IMM GRANULOCYTES NFR BLD AUTO: 0.6 % — SIGNIFICANT CHANGE UP (ref 0–0.9)
IMM GRANULOCYTES NFR BLD AUTO: 0.6 % — SIGNIFICANT CHANGE UP (ref 0–0.9)
INR BLD: 1.07 RATIO — SIGNIFICANT CHANGE UP (ref 0.85–1.18)
INR BLD: 1.07 RATIO — SIGNIFICANT CHANGE UP (ref 0.85–1.18)
LDH SERPL L TO P-CCNC: 497 U/L — HIGH (ref 98–192)
LDH SERPL L TO P-CCNC: 497 U/L — HIGH (ref 98–192)
LIDOCAIN IGE QN: 27 U/L — SIGNIFICANT CHANGE UP (ref 22–51)
LIDOCAIN IGE QN: 27 U/L — SIGNIFICANT CHANGE UP (ref 22–51)
LYMPHOCYTES # BLD AUTO: 1.31 K/UL — SIGNIFICANT CHANGE UP (ref 1–3.3)
LYMPHOCYTES # BLD AUTO: 1.31 K/UL — SIGNIFICANT CHANGE UP (ref 1–3.3)
LYMPHOCYTES # BLD AUTO: 13.3 % — SIGNIFICANT CHANGE UP (ref 13–44)
LYMPHOCYTES # BLD AUTO: 13.3 % — SIGNIFICANT CHANGE UP (ref 13–44)
MACROCYTES BLD QL: SLIGHT — SIGNIFICANT CHANGE UP
MACROCYTES BLD QL: SLIGHT — SIGNIFICANT CHANGE UP
MANUAL SMEAR VERIFICATION: SIGNIFICANT CHANGE UP
MANUAL SMEAR VERIFICATION: SIGNIFICANT CHANGE UP
MCHC RBC-ENTMCNC: 34.5 GM/DL — SIGNIFICANT CHANGE UP (ref 32–36)
MCHC RBC-ENTMCNC: 34.5 GM/DL — SIGNIFICANT CHANGE UP (ref 32–36)
MCHC RBC-ENTMCNC: 36.3 PG — HIGH (ref 27–34)
MCHC RBC-ENTMCNC: 36.3 PG — HIGH (ref 27–34)
MCV RBC AUTO: 105 FL — HIGH (ref 80–100)
MCV RBC AUTO: 105 FL — HIGH (ref 80–100)
MONOCYTES # BLD AUTO: 0.54 K/UL — SIGNIFICANT CHANGE UP (ref 0–0.9)
MONOCYTES # BLD AUTO: 0.54 K/UL — SIGNIFICANT CHANGE UP (ref 0–0.9)
MONOCYTES NFR BLD AUTO: 5.5 % — SIGNIFICANT CHANGE UP (ref 2–14)
MONOCYTES NFR BLD AUTO: 5.5 % — SIGNIFICANT CHANGE UP (ref 2–14)
NEUTROPHILS # BLD AUTO: 7.53 K/UL — HIGH (ref 1.8–7.4)
NEUTROPHILS # BLD AUTO: 7.53 K/UL — HIGH (ref 1.8–7.4)
NEUTROPHILS NFR BLD AUTO: 76.7 % — SIGNIFICANT CHANGE UP (ref 43–77)
NEUTROPHILS NFR BLD AUTO: 76.7 % — SIGNIFICANT CHANGE UP (ref 43–77)
PCO2 BLDA: 39 MMHG — SIGNIFICANT CHANGE UP (ref 32–45)
PCO2 BLDA: 39 MMHG — SIGNIFICANT CHANGE UP (ref 32–45)
PH BLDA: 7.52 — HIGH (ref 7.35–7.45)
PH BLDA: 7.52 — HIGH (ref 7.35–7.45)
PLAT MORPH BLD: NORMAL — SIGNIFICANT CHANGE UP
PLAT MORPH BLD: NORMAL — SIGNIFICANT CHANGE UP
PLATELET # BLD AUTO: 248 K/UL — SIGNIFICANT CHANGE UP (ref 150–400)
PLATELET # BLD AUTO: 248 K/UL — SIGNIFICANT CHANGE UP (ref 150–400)
PO2 BLDA: 78 MMHG — LOW (ref 83–108)
PO2 BLDA: 78 MMHG — LOW (ref 83–108)
POLYCHROMASIA BLD QL SMEAR: SIGNIFICANT CHANGE UP
POLYCHROMASIA BLD QL SMEAR: SIGNIFICANT CHANGE UP
POTASSIUM SERPL-MCNC: 3.1 MMOL/L — LOW (ref 3.5–5.3)
POTASSIUM SERPL-MCNC: 3.1 MMOL/L — LOW (ref 3.5–5.3)
POTASSIUM SERPL-SCNC: 3.1 MMOL/L — LOW (ref 3.5–5.3)
POTASSIUM SERPL-SCNC: 3.1 MMOL/L — LOW (ref 3.5–5.3)
PROT SERPL-MCNC: 6.6 G/DL — SIGNIFICANT CHANGE UP (ref 6.6–8.7)
PROT SERPL-MCNC: 6.6 G/DL — SIGNIFICANT CHANGE UP (ref 6.6–8.7)
PROTHROM AB SERPL-ACNC: 11.9 SEC — SIGNIFICANT CHANGE UP (ref 9.5–13)
PROTHROM AB SERPL-ACNC: 11.9 SEC — SIGNIFICANT CHANGE UP (ref 9.5–13)
RBC # BLD: 3.17 M/UL — LOW (ref 3.8–5.2)
RBC # BLD: 3.17 M/UL — LOW (ref 3.8–5.2)
RBC # FLD: 13.5 % — SIGNIFICANT CHANGE UP (ref 10.3–14.5)
RBC # FLD: 13.5 % — SIGNIFICANT CHANGE UP (ref 10.3–14.5)
RBC BLD AUTO: ABNORMAL
RBC BLD AUTO: ABNORMAL
SAO2 % BLDA: 94 % — SIGNIFICANT CHANGE UP (ref 94–98)
SAO2 % BLDA: 94 % — SIGNIFICANT CHANGE UP (ref 94–98)
SODIUM SERPL-SCNC: 133 MMOL/L — LOW (ref 135–145)
SODIUM SERPL-SCNC: 133 MMOL/L — LOW (ref 135–145)
VARIANT LYMPHS # BLD: 0.9 % — SIGNIFICANT CHANGE UP (ref 0–6)
VARIANT LYMPHS # BLD: 0.9 % — SIGNIFICANT CHANGE UP (ref 0–6)
WBC # BLD: 9.82 K/UL — SIGNIFICANT CHANGE UP (ref 3.8–10.5)
WBC # BLD: 9.82 K/UL — SIGNIFICANT CHANGE UP (ref 3.8–10.5)
WBC # FLD AUTO: 9.82 K/UL — SIGNIFICANT CHANGE UP (ref 3.8–10.5)
WBC # FLD AUTO: 9.82 K/UL — SIGNIFICANT CHANGE UP (ref 3.8–10.5)

## 2023-12-18 PROCEDURE — 99285 EMERGENCY DEPT VISIT HI MDM: CPT | Mod: GC

## 2023-12-18 PROCEDURE — 71045 X-RAY EXAM CHEST 1 VIEW: CPT | Mod: 26

## 2023-12-18 RX ORDER — POTASSIUM CHLORIDE 20 MEQ
40 PACKET (EA) ORAL ONCE
Refills: 0 | Status: COMPLETED | OUTPATIENT
Start: 2023-12-18 | End: 2023-12-18

## 2023-12-18 RX ORDER — IPRATROPIUM/ALBUTEROL SULFATE 18-103MCG
3 AEROSOL WITH ADAPTER (GRAM) INHALATION ONCE
Refills: 0 | Status: COMPLETED | OUTPATIENT
Start: 2023-12-18 | End: 2023-12-18

## 2023-12-18 RX ORDER — FAMOTIDINE 10 MG/ML
20 INJECTION INTRAVENOUS ONCE
Refills: 0 | Status: COMPLETED | OUTPATIENT
Start: 2023-12-18 | End: 2023-12-18

## 2023-12-18 RX ADMIN — Medication 3 MILLILITER(S): at 19:34

## 2023-12-18 RX ADMIN — Medication 125 MILLIGRAM(S): at 18:24

## 2023-12-18 RX ADMIN — FAMOTIDINE 20 MILLIGRAM(S): 10 INJECTION INTRAVENOUS at 18:24

## 2023-12-18 RX ADMIN — Medication 3 MILLILITER(S): at 19:47

## 2023-12-18 RX ADMIN — Medication 40 MILLIEQUIVALENT(S): at 19:57

## 2023-12-18 RX ADMIN — Medication 3 MILLILITER(S): at 18:35

## 2023-12-18 NOTE — ED ADULT NURSE NOTE - NSFALLHARMRISKINTERV_ED_ALL_ED
Assistance OOB with selected safe patient handling equipment if applicable/Assistance with ambulation/Communicate risk of Fall with Harm to all staff, patient, and family/Provide visual cue: red socks, yellow wristband, yellow gown, etc/Reinforce activity limits and safety measures with patient and family/Bed in lowest position, wheels locked, appropriate side rails in place/Call bell, personal items and telephone in reach/Instruct patient to call for assistance before getting out of bed/chair/stretcher/Non-slip footwear applied when patient is off stretcher/[place] to call system/Physically safe environment - no spills, clutter or unnecessary equipment/Purposeful Proactive Rounding/Room/bathroom lighting operational, light cord in reach Assistance OOB with selected safe patient handling equipment if applicable/Assistance with ambulation/Communicate risk of Fall with Harm to all staff, patient, and family/Provide visual cue: red socks, yellow wristband, yellow gown, etc/Reinforce activity limits and safety measures with patient and family/Bed in lowest position, wheels locked, appropriate side rails in place/Call bell, personal items and telephone in reach/Instruct patient to call for assistance before getting out of bed/chair/stretcher/Non-slip footwear applied when patient is off stretcher/[place] to call system/Physically safe environment - no spills, clutter or unnecessary equipment/Purposeful Proactive Rounding/Room/bathroom lighting operational, light cord in reach

## 2023-12-18 NOTE — ED PROVIDER NOTE - ATTENDING CONTRIBUTION TO CARE
I, Kasandra Johnson, have personally seen and examined this patient. I have fully participated in the care of this patient. I have reviewed all pertinent clinical information, including history, physical exam, plan and the Resident's note and agree except as noted below.     63yo F presenting with scratchy throat and sensation of closing, no oropharyngeal swelling. no rash but sometimes itching. no vomiting/diarrhea. on exam faint wheeze, no respiratory distress. pulse ox hypoxia possible from methyl blue patient is on. given breathing tx. no concern for anaphylaxis. CTA negative. \    Patient signed out to incoming physician pending results of testing and reassessment.  All decisions regarding the progression of care will be made at their discretion. I, Kasandra Johnson, have personally seen and examined this patient. I have fully participated in the care of this patient. I have reviewed all pertinent clinical information, including history, physical exam, plan and the Resident's note and agree except as noted below.     61yo F presenting with scratchy throat and sensation of closing, no oropharyngeal swelling. no rash but sometimes itching. no vomiting/diarrhea. on exam faint wheeze, no respiratory distress. pulse ox hypoxia possible from methyl blue patient is on. given breathing tx. no concern for anaphylaxis. CTA negative. \    Patient signed out to incoming physician pending results of testing and reassessment.  All decisions regarding the progression of care will be made at their discretion.

## 2023-12-18 NOTE — ED PROVIDER NOTE - CLINICAL SUMMARY MEDICAL DECISION MAKING FREE TEXT BOX
63 y/o female w/ PMHx of C. diff, aortic thrombus (on Eliquis), anxiety presenting for concern for throat swelling that has been ongoing for the last 2 days. Pt is newly showing pusle ox of mid to high 80s. Possibly due to methylene blue. Taking eliquis but concern for PE given history Treating allergic reaction. Imagine. Will reassess. 61 y/o female w/ PMHx of C. diff, aortic thrombus (on Eliquis), anxiety presenting for concern for throat swelling that has been ongoing for the last 2 days. Pt is newly showing pusle ox of mid to high 80s. Possibly due to methylene blue. Taking eliquis but concern for PE given history Treating allergic reaction. Imagine. Will reassess.

## 2023-12-18 NOTE — ED PROVIDER NOTE - OBJECTIVE STATEMENT
63 y/o female w/ PMHx of C. diff, aortic thrombus (on Eliquis), anxiety presenting for concern for throat swelling that has been ongoing for the last 2 days. Pt says that 2 days ago she was admitted for the same but symptoms has resolved by the end of say. Pt reporting sob. Pt has not had f/c, cough, congestion, n/v. Pt is able to tolerate secretions. Pt says that her PCP saw her and referred her to immunology for concern for G6PD deficiency. Currently reports she is taking methylene blue (unknown reason) and nitrofurantoin (UTI). 61 y/o female w/ PMHx of C. diff, aortic thrombus (on Eliquis), anxiety presenting for concern for throat swelling that has been ongoing for the last 2 days. Pt says that 2 days ago she was admitted for the same but symptoms has resolved by the end of say. Pt reporting sob. Pt has not had f/c, cough, congestion, n/v. Pt is able to tolerate secretions. Pt says that her PCP saw her and referred her to immunology for concern for G6PD deficiency. Currently reports she is taking methylene blue (unknown reason) and nitrofurantoin (UTI).

## 2023-12-18 NOTE — ED ADULT TRIAGE NOTE - STATUS:
Call Center TCM Note      Flowsheet Row Responses   [facility] patient discharged from? [facility]   Does the patient have one of the following disease processes/diagnoses(primary or secondary)? Sepsis   TCM attempt successful? Yes   Call start time 1249   Call end time 1251   General alerts for this patient Metastatic colon cancer to liver   Discharge diagnosis Sepsis   Meds reviewed with patient/caregiver? Yes   Is the patient having any side effects they believe may be caused by any medication additions or changes? No   Does the patient have all medications related to this admission filled (includes all antibiotics, inhalers, nebulizers,steroids,etc.) Yes   Is the patient taking all medications as directed (includes completed medication regime)? Yes   Comments Pt declined Hosp dc fu at this time   Does the patient have an appointment with their PCP within 7-14 days of discharge? No   Nursing Interventions Patient desires to follow up with specialty only   Psychosocial issues? No   Did the patient receive a copy of their discharge instructions? Yes   Nursing interventions Reviewed instructions with patient   What is the patient's perception of their health status since discharge? Improving   Nursing interventions Nurse provided patient education   Is the patient/caregiver able to teach back TIME? I nfection - may have signs and symptoms of an infection, T emperature - higher or lower than normal, M ental Decline - confused, sleepy, difficult to arouse, E xtremely Ill - severe pain, discomfort, shortness of breath   Is the patient/caregiver able to teach back the hierarchy of who to call/visit for symptoms/problems? PCP, Specialist, Home health nurse, Urgent Care, ED, 911 Yes   TCM call completed? Yes   Wrap up additional comments Pt at Oncology appt . Declined PCP FU at this time.   Call end time 1251            Pushpa Priest RN    8/7/2023, 12:52 CDT        
Applied

## 2023-12-18 NOTE — ED ADULT NURSE REASSESSMENT NOTE - NS ED NURSE REASSESS COMMENT FT1
assumed care from NISHA nieves.  pt resting comfortably in stretcher, currently receiving neb tx, visible chest rise and fall noted.  pt A&O x4.  maintained on CM.  NAD at this time.

## 2023-12-18 NOTE — ED ADULT TRIAGE NOTE - CHIEF COMPLAINT QUOTE
pt states she is having some sort of reaction, was seen in [place] on Thursday, c/o tongue swelling , rash to whole body  unable to move arms, rash to arms legs chest, burning pain  A&Ox3, resp wnl pt brought to critical pt states she is having some sort of reaction, was seen in [place] on Thursday, c/o tongue swelling , rash to whole body  unable to move arms, rash to arms legs chest, burning pain  A&Ox3, resp wnl pt brought to critical

## 2023-12-18 NOTE — ED PROVIDER NOTE - ED STEMI HIDDEN
hide You can access the FollowMyHealth Patient Portal offered by [facility] by registering at the following website: [email or web address]. By joining [employer]’s FollowMyHealth portal, you will also be able to view your health information using other applications (apps) compatible with our system.

## 2023-12-18 NOTE — ED ADULT NURSE NOTE - CHIEF COMPLAINT QUOTE
pt states she is having some sort of reaction, was seen in [place] on Thursday, c/o tongue swelling , rash to whole body  unable to move arms, rash to arms legs chest, burning pain  A&Ox3, resp wnl pt brought to critical pt states she is having some sort of reaction, was seen in [place] on Thursday, c/o tongue swelling , rash to whole body  unable to move arms, rash to arms legs chest, burning pain  A&Ox3, resp wnl pt brought to critical

## 2023-12-18 NOTE — ED ADULT NURSE NOTE - OBJECTIVE STATEMENT
Pt is complaining of allergy symptoms. Swelling of mouth, SOB, itchiness, and yellowing. claiming this is al from a "bite from a sea urchin three years ago."

## 2023-12-18 NOTE — ED PROVIDER NOTE - PHYSICAL EXAMINATION
General: well appearing, NAD  Head: NC, AT, no throat, soft pallet, lip, or facial swelling  EENT: EOMI, no scleral icterus  Cardiac: RRR, no apparent murmurs, no lower extremity edema  Respiratory: CTABL, no respiratory distress   Abdomen: soft, ND, epigastric pain, nonperitonitic  MSK/Vascular: full ROM, soft compartments, warm extremities  Neuro: AAOx3, sensation to light touch intact  Psych: calm, cooperative

## 2023-12-19 DIAGNOSIS — J96.01 ACUTE RESPIRATORY FAILURE WITH HYPOXIA: ICD-10-CM

## 2023-12-19 PROBLEM — R06.02 SOBOE (SHORTNESS OF BREATH ON EXERTION): Status: ACTIVE | Noted: 2022-10-05

## 2023-12-19 PROBLEM — R07.89 ATYPICAL CHEST PAIN: Status: ACTIVE | Noted: 2021-08-18

## 2023-12-19 PROBLEM — Z87.891 SMOKING HISTORY: Status: RESOLVED | Noted: 2020-08-18 | Resolved: 2022-08-22

## 2023-12-19 PROBLEM — N39.0 ACUTE UTI: Status: ACTIVE | Noted: 2022-08-24

## 2023-12-19 LAB
ALBUMIN SERPL ELPH-MCNC: 2.9 G/DL — LOW (ref 3.3–5.2)
ALBUMIN SERPL ELPH-MCNC: 2.9 G/DL — LOW (ref 3.3–5.2)
ALP SERPL-CCNC: 62 U/L — SIGNIFICANT CHANGE UP (ref 40–120)
ALP SERPL-CCNC: 62 U/L — SIGNIFICANT CHANGE UP (ref 40–120)
ALT FLD-CCNC: 15 U/L — SIGNIFICANT CHANGE UP
ALT FLD-CCNC: 15 U/L — SIGNIFICANT CHANGE UP
AMMONIA BLD-MCNC: 31 UMOL/L — SIGNIFICANT CHANGE UP (ref 11–55)
AMMONIA BLD-MCNC: 31 UMOL/L — SIGNIFICANT CHANGE UP (ref 11–55)
ANION GAP SERPL CALC-SCNC: 8 MMOL/L — SIGNIFICANT CHANGE UP (ref 5–17)
ANION GAP SERPL CALC-SCNC: 8 MMOL/L — SIGNIFICANT CHANGE UP (ref 5–17)
APPEARANCE UR: CLEAR — SIGNIFICANT CHANGE UP
APPEARANCE UR: CLEAR — SIGNIFICANT CHANGE UP
AST SERPL-CCNC: 32 U/L — HIGH
AST SERPL-CCNC: 32 U/L — HIGH
BACTERIA # UR AUTO: NEGATIVE /HPF — SIGNIFICANT CHANGE UP
BACTERIA # UR AUTO: NEGATIVE /HPF — SIGNIFICANT CHANGE UP
BILIRUB SERPL-MCNC: 0.5 MG/DL — SIGNIFICANT CHANGE UP (ref 0.4–2)
BILIRUB SERPL-MCNC: 0.5 MG/DL — SIGNIFICANT CHANGE UP (ref 0.4–2)
BILIRUB UR-MCNC: NEGATIVE — SIGNIFICANT CHANGE UP
BILIRUB UR-MCNC: NEGATIVE — SIGNIFICANT CHANGE UP
BLOOD GAS SOURCE: SIGNIFICANT CHANGE UP
BLOOD GAS SOURCE: SIGNIFICANT CHANGE UP
BUN SERPL-MCNC: 18.1 MG/DL — SIGNIFICANT CHANGE UP (ref 8–20)
BUN SERPL-MCNC: 18.1 MG/DL — SIGNIFICANT CHANGE UP (ref 8–20)
CALCIUM SERPL-MCNC: 8.5 MG/DL — SIGNIFICANT CHANGE UP (ref 8.4–10.5)
CALCIUM SERPL-MCNC: 8.5 MG/DL — SIGNIFICANT CHANGE UP (ref 8.4–10.5)
CAST: 1 /LPF — SIGNIFICANT CHANGE UP (ref 0–4)
CAST: 1 /LPF — SIGNIFICANT CHANGE UP (ref 0–4)
CHLORIDE SERPL-SCNC: 98 MMOL/L — SIGNIFICANT CHANGE UP (ref 96–108)
CHLORIDE SERPL-SCNC: 98 MMOL/L — SIGNIFICANT CHANGE UP (ref 96–108)
CK SERPL-CCNC: 46 U/L — SIGNIFICANT CHANGE UP (ref 25–170)
CK SERPL-CCNC: 46 U/L — SIGNIFICANT CHANGE UP (ref 25–170)
CO2 SERPL-SCNC: 29 MMOL/L — SIGNIFICANT CHANGE UP (ref 22–29)
CO2 SERPL-SCNC: 29 MMOL/L — SIGNIFICANT CHANGE UP (ref 22–29)
COLOR SPEC: ABNORMAL
COLOR SPEC: ABNORMAL
CREAT SERPL-MCNC: 0.78 MG/DL — SIGNIFICANT CHANGE UP (ref 0.5–1.3)
CREAT SERPL-MCNC: 0.78 MG/DL — SIGNIFICANT CHANGE UP (ref 0.5–1.3)
D DIMER BLD IA.RAPID-MCNC: 843 NG/ML DDU — HIGH
D DIMER BLD IA.RAPID-MCNC: 843 NG/ML DDU — HIGH
DIFF PNL FLD: NEGATIVE — SIGNIFICANT CHANGE UP
DIFF PNL FLD: NEGATIVE — SIGNIFICANT CHANGE UP
EGFR: 85 ML/MIN/1.73M2 — SIGNIFICANT CHANGE UP
EGFR: 85 ML/MIN/1.73M2 — SIGNIFICANT CHANGE UP
GAS PNL BLDA: SIGNIFICANT CHANGE UP
GAS PNL BLDA: SIGNIFICANT CHANGE UP
GLUCOSE SERPL-MCNC: 164 MG/DL — HIGH (ref 70–99)
GLUCOSE SERPL-MCNC: 164 MG/DL — HIGH (ref 70–99)
GLUCOSE UR QL: NEGATIVE MG/DL — SIGNIFICANT CHANGE UP
GLUCOSE UR QL: NEGATIVE MG/DL — SIGNIFICANT CHANGE UP
HAPTOGLOB SERPL-MCNC: 69 MG/DL — SIGNIFICANT CHANGE UP (ref 34–200)
HAPTOGLOB SERPL-MCNC: 69 MG/DL — SIGNIFICANT CHANGE UP (ref 34–200)
HCT VFR BLD CALC: 32.1 % — LOW (ref 34.5–45)
HCT VFR BLD CALC: 32.1 % — LOW (ref 34.5–45)
HGB BLD CALC-MCNC: 10.3 G/DL — LOW (ref 11.7–16.1)
HGB BLD CALC-MCNC: 10.3 G/DL — LOW (ref 11.7–16.1)
HGB BLD CALC-MCNC: 11.5 G/DL — LOW (ref 11.7–16.1)
HGB BLD CALC-MCNC: 11.5 G/DL — LOW (ref 11.7–16.1)
HGB BLD-MCNC: 10.3 G/DL — LOW (ref 11.5–15.5)
HGB BLD-MCNC: 10.3 G/DL — LOW (ref 11.5–15.5)
KETONES UR-MCNC: NEGATIVE MG/DL — SIGNIFICANT CHANGE UP
KETONES UR-MCNC: NEGATIVE MG/DL — SIGNIFICANT CHANGE UP
LEUKOCYTE ESTERASE UR-ACNC: ABNORMAL
LEUKOCYTE ESTERASE UR-ACNC: ABNORMAL
MCHC RBC-ENTMCNC: 32.1 GM/DL — SIGNIFICANT CHANGE UP (ref 32–36)
MCHC RBC-ENTMCNC: 32.1 GM/DL — SIGNIFICANT CHANGE UP (ref 32–36)
MCHC RBC-ENTMCNC: 34.3 PG — HIGH (ref 27–34)
MCHC RBC-ENTMCNC: 34.3 PG — HIGH (ref 27–34)
MCV RBC AUTO: 107 FL — HIGH (ref 80–100)
MCV RBC AUTO: 107 FL — HIGH (ref 80–100)
METHGB MFR BLDV: 13 % — HIGH
METHGB MFR BLDV: 13 % — HIGH
METHGB MFR BLDV: 15.4 % — HIGH
METHGB MFR BLDV: 15.4 % — HIGH
NITRITE UR-MCNC: NEGATIVE — SIGNIFICANT CHANGE UP
NITRITE UR-MCNC: NEGATIVE — SIGNIFICANT CHANGE UP
NT-PROBNP SERPL-SCNC: 1064 PG/ML — HIGH (ref 0–300)
NT-PROBNP SERPL-SCNC: 1064 PG/ML — HIGH (ref 0–300)
PH UR: 7 — SIGNIFICANT CHANGE UP (ref 5–8)
PH UR: 7 — SIGNIFICANT CHANGE UP (ref 5–8)
PLATELET # BLD AUTO: 227 K/UL — SIGNIFICANT CHANGE UP (ref 150–400)
PLATELET # BLD AUTO: 227 K/UL — SIGNIFICANT CHANGE UP (ref 150–400)
POTASSIUM SERPL-MCNC: 4.5 MMOL/L — SIGNIFICANT CHANGE UP (ref 3.5–5.3)
POTASSIUM SERPL-MCNC: 4.5 MMOL/L — SIGNIFICANT CHANGE UP (ref 3.5–5.3)
POTASSIUM SERPL-SCNC: 4.5 MMOL/L — SIGNIFICANT CHANGE UP (ref 3.5–5.3)
POTASSIUM SERPL-SCNC: 4.5 MMOL/L — SIGNIFICANT CHANGE UP (ref 3.5–5.3)
PROT SERPL-MCNC: 5.8 G/DL — LOW (ref 6.6–8.7)
PROT SERPL-MCNC: 5.8 G/DL — LOW (ref 6.6–8.7)
PROT UR-MCNC: SIGNIFICANT CHANGE UP MG/DL
PROT UR-MCNC: SIGNIFICANT CHANGE UP MG/DL
RAPID RVP RESULT: SIGNIFICANT CHANGE UP
RAPID RVP RESULT: SIGNIFICANT CHANGE UP
RBC # BLD: 3 M/UL — LOW (ref 3.8–5.2)
RBC # BLD: 3 M/UL — LOW (ref 3.8–5.2)
RBC # FLD: 13.7 % — SIGNIFICANT CHANGE UP (ref 10.3–14.5)
RBC # FLD: 13.7 % — SIGNIFICANT CHANGE UP (ref 10.3–14.5)
RBC CASTS # UR COMP ASSIST: 2 /HPF — SIGNIFICANT CHANGE UP (ref 0–4)
RBC CASTS # UR COMP ASSIST: 2 /HPF — SIGNIFICANT CHANGE UP (ref 0–4)
S PYO DNA THROAT QL NAA+PROBE: SIGNIFICANT CHANGE UP
S PYO DNA THROAT QL NAA+PROBE: SIGNIFICANT CHANGE UP
SARS-COV-2 RNA SPEC QL NAA+PROBE: SIGNIFICANT CHANGE UP
SARS-COV-2 RNA SPEC QL NAA+PROBE: SIGNIFICANT CHANGE UP
SODIUM SERPL-SCNC: 135 MMOL/L — SIGNIFICANT CHANGE UP (ref 135–145)
SODIUM SERPL-SCNC: 135 MMOL/L — SIGNIFICANT CHANGE UP (ref 135–145)
SP GR SPEC: >1.03 — HIGH (ref 1–1.03)
SP GR SPEC: >1.03 — HIGH (ref 1–1.03)
SQUAMOUS # UR AUTO: 2 /HPF — SIGNIFICANT CHANGE UP (ref 0–5)
SQUAMOUS # UR AUTO: 2 /HPF — SIGNIFICANT CHANGE UP (ref 0–5)
TROPONIN T, HIGH SENSITIVITY RESULT: 7 NG/L — SIGNIFICANT CHANGE UP (ref 0–51)
TROPONIN T, HIGH SENSITIVITY RESULT: 7 NG/L — SIGNIFICANT CHANGE UP (ref 0–51)
UROBILINOGEN FLD QL: 1 MG/DL — SIGNIFICANT CHANGE UP (ref 0.2–1)
UROBILINOGEN FLD QL: 1 MG/DL — SIGNIFICANT CHANGE UP (ref 0.2–1)
WBC # BLD: 8.53 K/UL — SIGNIFICANT CHANGE UP (ref 3.8–10.5)
WBC # BLD: 8.53 K/UL — SIGNIFICANT CHANGE UP (ref 3.8–10.5)
WBC # FLD AUTO: 8.53 K/UL — SIGNIFICANT CHANGE UP (ref 3.8–10.5)
WBC # FLD AUTO: 8.53 K/UL — SIGNIFICANT CHANGE UP (ref 3.8–10.5)
WBC UR QL: 0 /HPF — SIGNIFICANT CHANGE UP (ref 0–5)
WBC UR QL: 0 /HPF — SIGNIFICANT CHANGE UP (ref 0–5)

## 2023-12-19 PROCEDURE — 71275 CT ANGIOGRAPHY CHEST: CPT | Mod: 26,MA

## 2023-12-19 PROCEDURE — 93970 EXTREMITY STUDY: CPT | Mod: 26

## 2023-12-19 PROCEDURE — 74177 CT ABD & PELVIS W/CONTRAST: CPT | Mod: 26,MA

## 2023-12-19 PROCEDURE — 99223 1ST HOSP IP/OBS HIGH 75: CPT

## 2023-12-19 PROCEDURE — 70450 CT HEAD/BRAIN W/O DYE: CPT | Mod: 26

## 2023-12-19 PROCEDURE — 93010 ELECTROCARDIOGRAM REPORT: CPT

## 2023-12-19 PROCEDURE — 99222 1ST HOSP IP/OBS MODERATE 55: CPT

## 2023-12-19 RX ORDER — ALBUTEROL 90 UG/1
2.5 AEROSOL, METERED ORAL EVERY 6 HOURS
Refills: 0 | Status: DISCONTINUED | OUTPATIENT
Start: 2023-12-19 | End: 2023-12-21

## 2023-12-19 RX ORDER — DIPHENHYDRAMINE HCL 50 MG
25 CAPSULE ORAL ONCE
Refills: 0 | Status: COMPLETED | OUTPATIENT
Start: 2023-12-19 | End: 2023-12-19

## 2023-12-19 RX ORDER — PANTOPRAZOLE SODIUM 20 MG/1
40 TABLET, DELAYED RELEASE ORAL
Refills: 0 | Status: DISCONTINUED | OUTPATIENT
Start: 2023-12-19 | End: 2023-12-19

## 2023-12-19 RX ORDER — PANTOPRAZOLE SODIUM 20 MG/1
40 TABLET, DELAYED RELEASE ORAL EVERY 12 HOURS
Refills: 0 | Status: DISCONTINUED | OUTPATIENT
Start: 2023-12-19 | End: 2023-12-21

## 2023-12-19 RX ORDER — SODIUM CHLORIDE 9 MG/ML
1000 INJECTION, SOLUTION INTRAVENOUS
Refills: 0 | Status: DISCONTINUED | OUTPATIENT
Start: 2023-12-19 | End: 2023-12-20

## 2023-12-19 RX ORDER — ACETAMINOPHEN 500 MG
650 TABLET ORAL EVERY 6 HOURS
Refills: 0 | Status: DISCONTINUED | OUTPATIENT
Start: 2023-12-19 | End: 2023-12-21

## 2023-12-19 RX ORDER — APIXABAN 2.5 MG/1
5 TABLET, FILM COATED ORAL EVERY 12 HOURS
Refills: 0 | Status: DISCONTINUED | OUTPATIENT
Start: 2023-12-19 | End: 2023-12-21

## 2023-12-19 RX ORDER — LANOLIN ALCOHOL/MO/W.PET/CERES
3 CREAM (GRAM) TOPICAL AT BEDTIME
Refills: 0 | Status: DISCONTINUED | OUTPATIENT
Start: 2023-12-19 | End: 2023-12-21

## 2023-12-19 RX ORDER — ALBUTEROL 90 UG/1
2.5 AEROSOL, METERED ORAL ONCE
Refills: 0 | Status: COMPLETED | OUTPATIENT
Start: 2023-12-19 | End: 2023-12-19

## 2023-12-19 RX ORDER — DIPHENHYDRAMINE HCL 50 MG
25 CAPSULE ORAL EVERY 8 HOURS
Refills: 0 | Status: DISCONTINUED | OUTPATIENT
Start: 2023-12-19 | End: 2023-12-21

## 2023-12-19 RX ORDER — ONDANSETRON 8 MG/1
4 TABLET, FILM COATED ORAL EVERY 8 HOURS
Refills: 0 | Status: DISCONTINUED | OUTPATIENT
Start: 2023-12-19 | End: 2023-12-21

## 2023-12-19 RX ORDER — ATORVASTATIN CALCIUM 80 MG/1
40 TABLET, FILM COATED ORAL AT BEDTIME
Refills: 0 | Status: DISCONTINUED | OUTPATIENT
Start: 2023-12-19 | End: 2023-12-21

## 2023-12-19 RX ADMIN — APIXABAN 5 MILLIGRAM(S): 2.5 TABLET, FILM COATED ORAL at 08:41

## 2023-12-19 RX ADMIN — Medication 650 MILLIGRAM(S): at 16:12

## 2023-12-19 RX ADMIN — Medication 60 MILLIGRAM(S): at 21:54

## 2023-12-19 RX ADMIN — Medication 25 MILLIGRAM(S): at 15:42

## 2023-12-19 RX ADMIN — PANTOPRAZOLE SODIUM 40 MILLIGRAM(S): 20 TABLET, DELAYED RELEASE ORAL at 21:54

## 2023-12-19 RX ADMIN — ALBUTEROL 2.5 MILLIGRAM(S): 90 AEROSOL, METERED ORAL at 07:41

## 2023-12-19 RX ADMIN — SODIUM CHLORIDE 75 MILLILITER(S): 9 INJECTION, SOLUTION INTRAVENOUS at 10:17

## 2023-12-19 RX ADMIN — Medication 40 MILLIGRAM(S): at 05:42

## 2023-12-19 RX ADMIN — ALBUTEROL 2.5 MILLIGRAM(S): 90 AEROSOL, METERED ORAL at 05:42

## 2023-12-19 RX ADMIN — APIXABAN 5 MILLIGRAM(S): 2.5 TABLET, FILM COATED ORAL at 21:53

## 2023-12-19 RX ADMIN — ALBUTEROL 2.5 MILLIGRAM(S): 90 AEROSOL, METERED ORAL at 14:12

## 2023-12-19 RX ADMIN — Medication 60 MILLIGRAM(S): at 14:35

## 2023-12-19 RX ADMIN — ATORVASTATIN CALCIUM 40 MILLIGRAM(S): 80 TABLET, FILM COATED ORAL at 21:54

## 2023-12-19 RX ADMIN — ALBUTEROL 2.5 MILLIGRAM(S): 90 AEROSOL, METERED ORAL at 20:27

## 2023-12-19 RX ADMIN — PANTOPRAZOLE SODIUM 40 MILLIGRAM(S): 20 TABLET, DELAYED RELEASE ORAL at 09:14

## 2023-12-19 RX ADMIN — Medication 650 MILLIGRAM(S): at 15:42

## 2023-12-19 RX ADMIN — Medication 25 MILLIGRAM(S): at 23:46

## 2023-12-19 RX ADMIN — Medication 25 MILLIGRAM(S): at 05:42

## 2023-12-19 NOTE — REVIEW OF SYSTEMS
[Patient Intake Form Reviewed] : Patient intake form was reviewed [Fatigue] : no fatigue [Joint Pain] : no joint pain [Joint Stiffness] : no joint stiffness [Muscle Pain] : no muscle pain [Back Pain] : no back pain [Negative] : Heme/Lymph [FreeTextEntry2] : Overweight [FreeTextEntry7] : GERD

## 2023-12-19 NOTE — ED CLERICAL - DIVISION
[facility]... [facility]... [facility], [facility]

## 2023-12-19 NOTE — ED ADULT NURSE REASSESSMENT NOTE - NS ED NURSE REASSESS COMMENT FT1
patient appears confused, does not know where she is. Dr. Padilla at bedside. patient alert and sleepy.
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
## 2023-12-19 NOTE — CONSULT NOTE ADULT - ASSESSMENT
Currently no acute respiratory issues, no stridor, no bronchospasm  ABG with Metabolic alkalosis, adequate p02 on room air and 3 L  CTA without PE or acute lung infiltrate, stable 4 mm nodule OLIMPIA from 7/23  Hx Recurrent idopathic angioedema, ? G6PD ( LDH elevated, Bili normal)  Would wean medrol, prednisone taper- would keep on low dose till seen by allergy given hx, non sedating anti histamine  Check haptoglobin, G6pd, wean off 02  Needs Immunology/allergy follow up  Continue follow CT scans per guidelines and smoking cessation  Further Mgmt per medical team, call back if any pulmonary issues Currently no acute respiratory issues, no stridor, no bronchospasm  ABG with Metabolic alkalosis, adequate p02 on room air and 3 L, etiology of alkalosis unclear  CTA without PE or acute lung infiltrate, stable 4 mm nodule OLIMPIA from 7/23  Hx Recurrent idopathic angioedema, ? G6PD ( LDH elevated, Bili normal)  Would wean medrol, prednisone taper- would keep on low dose till seen by allergy given hx, non sedating anti histamine  Check haptoglobin, G6pd, wean off 02  Per pt she has been taking methylene blue at home, would check Met hb level also  Needs Immunology/allergy follow up  Continue follow CT scans per guidelines and smoking cessation   Currently no acute respiratory issues, no stridor, no bronchospasm  ABG with Metabolic alkalosis, adequate p02 on room air and 3 L, etiology of alkalosis unclear  CTA without PE or acute lung infiltrate, stable 4 mm nodule OLIMPIA from 7/23  Hx Recurrent idopathic angioedema, ? G6PD ( LDH elevated, Bili normal)  Would wean medrol, prednisone taper- would keep on low dose till seen by allergy given hx, non sedating anti histamine  Check haptoglobin, G6pd, wean off 02  Per pt she has been taking methylene blue at home, would check Met hb level also  Needs Immunology/allergy follow up  GI input re bowel edema/ duodenitis   Continue follow CT scans per guidelines and smoking cessation   Currently no acute respiratory issues, no stridor, no bronchospasm  ABG with Metabolic alkalosis, adequate p02 on room air and 3 L, etiology of alkalosis unclear  CTA without PE or acute lung infiltrate, stable 4 mm nodule OLIMPIA from 7/23  Hx Recurrent idopathic angioedema, ? G6PD ( LDH elevated, Bili normal)  Would wean medrol, prednisone taper- would keep on low dose till seen by allergy given hx, non sedating anti histamine  Check haptoglobin, G6pd, wean off 02  Per pt she has been taking methylene blue at home, would check Met hb level also  Needs Immunology/allergy follow up  GI input re bowel edema/ duodenitis   Continue follow CT scans per guidelines and smoking cessation    Addendum: methemoglobinemia not severe, but present  await G6PD level especially given hx of Methylene blue ( can cause if deficient)  Can use Vit C , follow levels
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
## 2023-12-19 NOTE — HEALTH RISK ASSESSMENT
[Yes] : Yes [Monthly or less (1 pt)] : Monthly or less (1 point) [1 or 2 (0 pts)] : 1 or 2 (0 points) [Never (0 pts)] : Never (0 points) [No] : In the past 12 months have you used drugs other than those required for medical reasons? No [No falls in past year] : Patient reported no falls in the past year [0] : 2) Feeling down, depressed, or hopeless: Not at all (0) [PHQ-2 Negative - No further assessment needed] : PHQ-2 Negative - No further assessment needed [Audit-CScore] : 1 [de-identified] : Average [de-identified] : Average [[facility] : 9 [[id number]] : 0 [Patient/Caregiver not ready to engage] : , patient/caregiver not ready to engage [I will adhere to the patient's wishes.] : I will adhere to the patient's wishes. [Time Spent: ___ minutes] : Time Spent: [unfilled] minutes [AdvancecareDate] : 10/21

## 2023-12-19 NOTE — HISTORY OF PRESENT ILLNESS
[FreeTextEntry1] : follow up [de-identified] : 63 year old female with hx blood clot presents for follow up. Pt w complex h/o skin lesions on and off erythematous rash x years recently had lesion bx'd by dermatologist and results showed insect bite. Pt being treated w ivermectin for suspected parasite infection [facility] for C-Diff was told to get treatment from PCP. Has NEW ID 7/6/23

## 2023-12-19 NOTE — CONSULT NOTE ADULT - TIME BILLING
greater than 50% of time spent reviewing labs, notes, orders and radiographs, coordinating care  discussed with pt, med team

## 2023-12-19 NOTE — H&P ADULT - NSHPPHYSICALEXAM_GEN_ALL_CORE
T(C): 36.9 (12-19-23 @ 07:23), Max: 37.2 (12-18-23 @ 17:24)  HR: 82 (12-19-23 @ 07:23) (71 - 87)  BP: 117/72 (12-19-23 @ 07:23) (115/56 - 134/81)  RR: 18 (12-19-23 @ 07:23) (18 - 18)  SpO2: 90% (12-19-23 @ 07:23) (86% - 92%)    GEN - NAD  HEENT - NCAT, EOMI, NOHEMY,   unable to check throat-pt is refusing  RESP - positive air enry,no wheeze, mild crackles b/l.  CARDIO - NS1S2, RRR. No murmurs/rubs/gallops.  ABD - Soft/Non tender/Non distended. Normal BS x4 quadrants.   Ext - No MINDY.   MSK -no joints swelling/tenderness  Neuro - pt is AAOX1 now.noncoop with exam,wants to sleep. good m/s    Psych- confuse,non coop

## 2023-12-19 NOTE — PATIENT PROFILE ADULT - FALL HARM RISK - RISK INTERVENTIONS
Assistance OOB with selected safe patient handling equipment/Assistance with ambulation/Communicate Fall Risk and Risk Factors to all staff, patient, and family/Reinforce activity limits and safety measures with patient and family/Visual Cue: Yellow wristband/Bed in lowest position, wheels locked, appropriate side rails in place/Call bell, personal items and telephone in reach/Instruct patient to call for assistance before getting out of bed or chair/Non-slip footwear when patient is out of bed/[place] to call system/Physically safe environment - no spills, clutter or unnecessary equipment/Purposeful Proactive Rounding/Room/bathroom lighting operational, light cord in reach Assistance OOB with selected safe patient handling equipment/Assistance with ambulation/Communicate Fall Risk and Risk Factors to all staff, patient, and family/Reinforce activity limits and safety measures with patient and family/Visual Cue: Yellow wristband/Bed in lowest position, wheels locked, appropriate side rails in place/Call bell, personal items and telephone in reach/Instruct patient to call for assistance before getting out of bed or chair/Non-slip footwear when patient is out of bed/[place] to call system/Physically safe environment - no spills, clutter or unnecessary equipment/Purposeful Proactive Rounding/Room/bathroom lighting operational, light cord in reach

## 2023-12-19 NOTE — PHYSICAL EXAM
[No Acute Distress] : no acute distress [Well Nourished] : well nourished [Well Developed] : well developed [Well-Appearing] : well-appearing [Normal Sclera/Conjunctiva] : normal sclera/conjunctiva [PERRL] : pupils equal round and reactive to light [EOMI] : extraocular movements intact [Normal Outer Ear/Nose] : the outer ears and nose were normal in appearance [Normal Oropharynx] : the oropharynx was normal [No JVD] : no jugular venous distention [No Lymphadenopathy] : no lymphadenopathy [Supple] : supple [Thyroid Normal, No Nodules] : the thyroid was normal and there were no nodules present [No Respiratory Distress] : no respiratory distress  [No Accessory Muscle Use] : no accessory muscle use [Clear to Auscultation] : lungs were clear to auscultation bilaterally [Normal Rate] : normal rate  [Regular Rhythm] : with a regular rhythm [Normal S1, S2] : normal S1 and S2 [No Murmur] : no murmur heard [No Carotid Bruits] : no carotid bruits [No Abdominal Bruit] : a ~M bruit was not heard ~T in the abdomen [No Varicosities] : no varicosities [Pedal Pulses Present] : the pedal pulses are present [No Edema] : there was no peripheral edema [No Palpable Aorta] : no palpable aorta [No Extremity Clubbing/Cyanosis] : no extremity clubbing/cyanosis [Soft] : abdomen soft [Non Tender] : non-tender [Non-distended] : non-distended [No Masses] : no abdominal mass palpated [No HSM] : no HSM [Normal Bowel Sounds] : normal bowel sounds [No CVA Tenderness] : no CVA  tenderness [No Spinal Tenderness] : no spinal tenderness [No Joint Swelling] : no joint swelling [Grossly Normal Strength/Tone] : grossly normal strength/tone [No Rash] : no rash [Coordination Grossly Intact] : coordination grossly intact [No Focal Deficits] : no focal deficits [Normal Gait] : normal gait [Deep Tendon Reflexes (DTR)] : deep tendon reflexes were 2+ and symmetric [___/1] : [unfilled]/1   [___/3] : [unfilled]/3 [___/5] : [unfilled]/5 [___/4] : [unfilled]/4 [___/2] : [unfilled]/2 [___/8] : [unfilled]/8 [Normal] : Normal [Normal Affect] : the affect was normal [Normal Insight/Judgement] : insight and judgment were intact [Comprehensive Foot Exam Normal] : Right and left foot were examined and both feet are normal. No ulcers in either foot. Toes are normal and with full ROM.  Normal tactile sensation with monofilament testing throughout both feet [TextBox_2] : yes [TextBox_4] : HS

## 2023-12-20 ENCOUNTER — APPOINTMENT (OUTPATIENT)
Dept: FAMILY MEDICINE | Facility: CLINIC | Age: 63
End: 2023-12-20
Payer: MEDICARE

## 2023-12-20 ENCOUNTER — APPOINTMENT (OUTPATIENT)
Dept: GASTROENTEROLOGY | Facility: CLINIC | Age: 63
End: 2023-12-20

## 2023-12-20 DIAGNOSIS — R07.89 OTHER CHEST PAIN: ICD-10-CM

## 2023-12-20 DIAGNOSIS — N39.0 URINARY TRACT INFECTION, SITE NOT SPECIFIED: ICD-10-CM

## 2023-12-20 DIAGNOSIS — Z87.891 PERSONAL HISTORY OF NICOTINE DEPENDENCE: ICD-10-CM

## 2023-12-20 DIAGNOSIS — F17.200 NICOTINE DEPENDENCE, UNSPECIFIED, UNCOMPLICATED: ICD-10-CM

## 2023-12-20 DIAGNOSIS — Z78.9 OTHER SPECIFIED HEALTH STATUS: ICD-10-CM

## 2023-12-20 DIAGNOSIS — R06.02 SHORTNESS OF BREATH: ICD-10-CM

## 2023-12-20 LAB
ACANTHOCYTES BLD QL SMEAR: SLIGHT — SIGNIFICANT CHANGE UP
ACANTHOCYTES BLD QL SMEAR: SLIGHT — SIGNIFICANT CHANGE UP
ANION GAP SERPL CALC-SCNC: 9 MMOL/L — SIGNIFICANT CHANGE UP (ref 5–17)
ANION GAP SERPL CALC-SCNC: 9 MMOL/L — SIGNIFICANT CHANGE UP (ref 5–17)
ANISOCYTOSIS BLD QL: SIGNIFICANT CHANGE UP
ANISOCYTOSIS BLD QL: SIGNIFICANT CHANGE UP
BASOPHILS # BLD AUTO: 0 K/UL — SIGNIFICANT CHANGE UP (ref 0–0.2)
BASOPHILS # BLD AUTO: 0 K/UL — SIGNIFICANT CHANGE UP (ref 0–0.2)
BASOPHILS # BLD AUTO: 0.01 K/UL — SIGNIFICANT CHANGE UP (ref 0–0.2)
BASOPHILS # BLD AUTO: 0.01 K/UL — SIGNIFICANT CHANGE UP (ref 0–0.2)
BASOPHILS NFR BLD AUTO: 0 % — SIGNIFICANT CHANGE UP (ref 0–2)
BASOPHILS NFR BLD AUTO: 0 % — SIGNIFICANT CHANGE UP (ref 0–2)
BASOPHILS NFR BLD AUTO: 0.1 % — SIGNIFICANT CHANGE UP (ref 0–2)
BASOPHILS NFR BLD AUTO: 0.1 % — SIGNIFICANT CHANGE UP (ref 0–2)
BLOOD GAS SOURCE: SIGNIFICANT CHANGE UP
BUN SERPL-MCNC: 25.7 MG/DL — HIGH (ref 8–20)
BUN SERPL-MCNC: 25.7 MG/DL — HIGH (ref 8–20)
CALCIUM SERPL-MCNC: 8.3 MG/DL — LOW (ref 8.4–10.5)
CALCIUM SERPL-MCNC: 8.3 MG/DL — LOW (ref 8.4–10.5)
CHLORIDE SERPL-SCNC: 98 MMOL/L — SIGNIFICANT CHANGE UP (ref 96–108)
CHLORIDE SERPL-SCNC: 98 MMOL/L — SIGNIFICANT CHANGE UP (ref 96–108)
CO2 SERPL-SCNC: 30 MMOL/L — HIGH (ref 22–29)
CO2 SERPL-SCNC: 30 MMOL/L — HIGH (ref 22–29)
CREAT SERPL-MCNC: 1.08 MG/DL — SIGNIFICANT CHANGE UP (ref 0.5–1.3)
CREAT SERPL-MCNC: 1.08 MG/DL — SIGNIFICANT CHANGE UP (ref 0.5–1.3)
EGFR: 57 ML/MIN/1.73M2 — LOW
EGFR: 57 ML/MIN/1.73M2 — LOW
EOSINOPHIL # BLD AUTO: 0 K/UL — SIGNIFICANT CHANGE UP (ref 0–0.5)
EOSINOPHIL # BLD AUTO: 0 K/UL — SIGNIFICANT CHANGE UP (ref 0–0.5)
EOSINOPHIL # BLD AUTO: 0.04 K/UL — SIGNIFICANT CHANGE UP (ref 0–0.5)
EOSINOPHIL # BLD AUTO: 0.04 K/UL — SIGNIFICANT CHANGE UP (ref 0–0.5)
EOSINOPHIL NFR BLD AUTO: 0 % — SIGNIFICANT CHANGE UP (ref 0–6)
EOSINOPHIL NFR BLD AUTO: 0 % — SIGNIFICANT CHANGE UP (ref 0–6)
EOSINOPHIL NFR BLD AUTO: 0.5 % — SIGNIFICANT CHANGE UP (ref 0–6)
EOSINOPHIL NFR BLD AUTO: 0.5 % — SIGNIFICANT CHANGE UP (ref 0–6)
GLUCOSE SERPL-MCNC: 155 MG/DL — HIGH (ref 70–99)
GLUCOSE SERPL-MCNC: 155 MG/DL — HIGH (ref 70–99)
HCT VFR BLD CALC: 28.3 % — LOW (ref 34.5–45)
HCT VFR BLD CALC: 28.3 % — LOW (ref 34.5–45)
HCT VFR BLD CALC: 31.2 % — LOW (ref 34.5–45)
HCT VFR BLD CALC: 31.2 % — LOW (ref 34.5–45)
HGB BLD CALC-MCNC: 10.4 G/DL — LOW (ref 11.7–16.1)
HGB BLD CALC-MCNC: 10.4 G/DL — LOW (ref 11.7–16.1)
HGB BLD CALC-MCNC: 9.7 G/DL — LOW (ref 11.7–16.1)
HGB BLD CALC-MCNC: 9.7 G/DL — LOW (ref 11.7–16.1)
HGB BLD-MCNC: 10.3 G/DL — LOW (ref 11.5–15.5)
HGB BLD-MCNC: 10.3 G/DL — LOW (ref 11.5–15.5)
HGB BLD-MCNC: 9.1 G/DL — LOW (ref 11.5–15.5)
HGB BLD-MCNC: 9.1 G/DL — LOW (ref 11.5–15.5)
IMM GRANULOCYTES NFR BLD AUTO: 0.4 % — SIGNIFICANT CHANGE UP (ref 0–0.9)
IMM GRANULOCYTES NFR BLD AUTO: 0.4 % — SIGNIFICANT CHANGE UP (ref 0–0.9)
LDH SERPL L TO P-CCNC: 324 U/L — HIGH (ref 98–192)
LDH SERPL L TO P-CCNC: 324 U/L — HIGH (ref 98–192)
LYMPHOCYTES # BLD AUTO: 0.13 K/UL — LOW (ref 1–3.3)
LYMPHOCYTES # BLD AUTO: 0.13 K/UL — LOW (ref 1–3.3)
LYMPHOCYTES # BLD AUTO: 0.87 K/UL — LOW (ref 1–3.3)
LYMPHOCYTES # BLD AUTO: 0.87 K/UL — LOW (ref 1–3.3)
LYMPHOCYTES # BLD AUTO: 1.8 % — LOW (ref 13–44)
LYMPHOCYTES # BLD AUTO: 1.8 % — LOW (ref 13–44)
LYMPHOCYTES # BLD AUTO: 10.9 % — LOW (ref 13–44)
LYMPHOCYTES # BLD AUTO: 10.9 % — LOW (ref 13–44)
MACROCYTES BLD QL: SIGNIFICANT CHANGE UP
MACROCYTES BLD QL: SIGNIFICANT CHANGE UP
MANUAL SMEAR VERIFICATION: SIGNIFICANT CHANGE UP
MANUAL SMEAR VERIFICATION: SIGNIFICANT CHANGE UP
MCHC RBC-ENTMCNC: 32.2 GM/DL — SIGNIFICANT CHANGE UP (ref 32–36)
MCHC RBC-ENTMCNC: 32.2 GM/DL — SIGNIFICANT CHANGE UP (ref 32–36)
MCHC RBC-ENTMCNC: 33 GM/DL — SIGNIFICANT CHANGE UP (ref 32–36)
MCHC RBC-ENTMCNC: 33 GM/DL — SIGNIFICANT CHANGE UP (ref 32–36)
MCHC RBC-ENTMCNC: 34.7 PG — HIGH (ref 27–34)
MCHC RBC-ENTMCNC: 34.7 PG — HIGH (ref 27–34)
MCHC RBC-ENTMCNC: 35.8 PG — HIGH (ref 27–34)
MCHC RBC-ENTMCNC: 35.8 PG — HIGH (ref 27–34)
MCV RBC AUTO: 108 FL — HIGH (ref 80–100)
MCV RBC AUTO: 108 FL — HIGH (ref 80–100)
MCV RBC AUTO: 108.3 FL — HIGH (ref 80–100)
MCV RBC AUTO: 108.3 FL — HIGH (ref 80–100)
METHGB MFR BLDV: 13.5 % — HIGH
METHGB MFR BLDV: 13.5 % — HIGH
METHGB MFR BLDV: 17.2 % — HIGH
METHGB MFR BLDV: 17.2 % — HIGH
MONOCYTES # BLD AUTO: 0.07 K/UL — SIGNIFICANT CHANGE UP (ref 0–0.9)
MONOCYTES # BLD AUTO: 0.07 K/UL — SIGNIFICANT CHANGE UP (ref 0–0.9)
MONOCYTES # BLD AUTO: 0.94 K/UL — HIGH (ref 0–0.9)
MONOCYTES # BLD AUTO: 0.94 K/UL — HIGH (ref 0–0.9)
MONOCYTES NFR BLD AUTO: 0.9 % — LOW (ref 2–14)
MONOCYTES NFR BLD AUTO: 0.9 % — LOW (ref 2–14)
MONOCYTES NFR BLD AUTO: 11.8 % — SIGNIFICANT CHANGE UP (ref 2–14)
MONOCYTES NFR BLD AUTO: 11.8 % — SIGNIFICANT CHANGE UP (ref 2–14)
NEUTROPHILS # BLD AUTO: 6.11 K/UL — SIGNIFICANT CHANGE UP (ref 1.8–7.4)
NEUTROPHILS # BLD AUTO: 6.11 K/UL — SIGNIFICANT CHANGE UP (ref 1.8–7.4)
NEUTROPHILS # BLD AUTO: 7.2 K/UL — SIGNIFICANT CHANGE UP (ref 1.8–7.4)
NEUTROPHILS # BLD AUTO: 7.2 K/UL — SIGNIFICANT CHANGE UP (ref 1.8–7.4)
NEUTROPHILS NFR BLD AUTO: 76.3 % — SIGNIFICANT CHANGE UP (ref 43–77)
NEUTROPHILS NFR BLD AUTO: 76.3 % — SIGNIFICANT CHANGE UP (ref 43–77)
NEUTROPHILS NFR BLD AUTO: 97.3 % — HIGH (ref 43–77)
NEUTROPHILS NFR BLD AUTO: 97.3 % — HIGH (ref 43–77)
PLAT MORPH BLD: NORMAL — SIGNIFICANT CHANGE UP
PLAT MORPH BLD: NORMAL — SIGNIFICANT CHANGE UP
PLATELET # BLD AUTO: 264 K/UL — SIGNIFICANT CHANGE UP (ref 150–400)
PLATELET # BLD AUTO: 264 K/UL — SIGNIFICANT CHANGE UP (ref 150–400)
PLATELET # BLD AUTO: 302 K/UL — SIGNIFICANT CHANGE UP (ref 150–400)
PLATELET # BLD AUTO: 302 K/UL — SIGNIFICANT CHANGE UP (ref 150–400)
POIKILOCYTOSIS BLD QL AUTO: SLIGHT — SIGNIFICANT CHANGE UP
POIKILOCYTOSIS BLD QL AUTO: SLIGHT — SIGNIFICANT CHANGE UP
POLYCHROMASIA BLD QL SMEAR: SIGNIFICANT CHANGE UP
POLYCHROMASIA BLD QL SMEAR: SIGNIFICANT CHANGE UP
POTASSIUM SERPL-MCNC: 4.2 MMOL/L — SIGNIFICANT CHANGE UP (ref 3.5–5.3)
POTASSIUM SERPL-MCNC: 4.2 MMOL/L — SIGNIFICANT CHANGE UP (ref 3.5–5.3)
POTASSIUM SERPL-SCNC: 4.2 MMOL/L — SIGNIFICANT CHANGE UP (ref 3.5–5.3)
POTASSIUM SERPL-SCNC: 4.2 MMOL/L — SIGNIFICANT CHANGE UP (ref 3.5–5.3)
RBC # BLD: 2.62 M/UL — LOW (ref 3.8–5.2)
RBC # BLD: 2.62 M/UL — LOW (ref 3.8–5.2)
RBC # BLD: 2.88 M/UL — LOW (ref 3.8–5.2)
RBC # BLD: 2.88 M/UL — LOW (ref 3.8–5.2)
RBC # FLD: 13.8 % — SIGNIFICANT CHANGE UP (ref 10.3–14.5)
RBC BLD AUTO: ABNORMAL
RBC BLD AUTO: ABNORMAL
SODIUM SERPL-SCNC: 137 MMOL/L — SIGNIFICANT CHANGE UP (ref 135–145)
SODIUM SERPL-SCNC: 137 MMOL/L — SIGNIFICANT CHANGE UP (ref 135–145)
WBC # BLD: 7.4 K/UL — SIGNIFICANT CHANGE UP (ref 3.8–10.5)
WBC # BLD: 7.4 K/UL — SIGNIFICANT CHANGE UP (ref 3.8–10.5)
WBC # BLD: 8 K/UL — SIGNIFICANT CHANGE UP (ref 3.8–10.5)
WBC # BLD: 8 K/UL — SIGNIFICANT CHANGE UP (ref 3.8–10.5)
WBC # FLD AUTO: 7.4 K/UL — SIGNIFICANT CHANGE UP (ref 3.8–10.5)
WBC # FLD AUTO: 7.4 K/UL — SIGNIFICANT CHANGE UP (ref 3.8–10.5)
WBC # FLD AUTO: 8 K/UL — SIGNIFICANT CHANGE UP (ref 3.8–10.5)
WBC # FLD AUTO: 8 K/UL — SIGNIFICANT CHANGE UP (ref 3.8–10.5)

## 2023-12-20 PROCEDURE — 99221 1ST HOSP IP/OBS SF/LOW 40: CPT

## 2023-12-20 PROCEDURE — 99233 SBSQ HOSP IP/OBS HIGH 50: CPT

## 2023-12-20 RX ORDER — ALPRAZOLAM 0.25 MG
1 TABLET ORAL
Refills: 0 | Status: DISCONTINUED | OUTPATIENT
Start: 2023-12-20 | End: 2023-12-21

## 2023-12-20 RX ORDER — METHYLENE BLUE 65 MG
57 TABLET ORAL ONCE
Refills: 0 | Status: COMPLETED | OUTPATIENT
Start: 2023-12-20 | End: 2023-12-20

## 2023-12-20 RX ORDER — MEPROBAMATE 400 MG/1
1 TABLET ORAL
Qty: 0 | Refills: 0 | DISCHARGE

## 2023-12-20 RX ADMIN — APIXABAN 5 MILLIGRAM(S): 2.5 TABLET, FILM COATED ORAL at 06:10

## 2023-12-20 RX ADMIN — ALBUTEROL 2.5 MILLIGRAM(S): 90 AEROSOL, METERED ORAL at 20:52

## 2023-12-20 RX ADMIN — ATORVASTATIN CALCIUM 40 MILLIGRAM(S): 80 TABLET, FILM COATED ORAL at 21:40

## 2023-12-20 RX ADMIN — Medication 60 MILLIGRAM(S): at 06:10

## 2023-12-20 RX ADMIN — PANTOPRAZOLE SODIUM 40 MILLIGRAM(S): 20 TABLET, DELAYED RELEASE ORAL at 17:10

## 2023-12-20 RX ADMIN — PANTOPRAZOLE SODIUM 40 MILLIGRAM(S): 20 TABLET, DELAYED RELEASE ORAL at 06:10

## 2023-12-20 RX ADMIN — ALBUTEROL 2.5 MILLIGRAM(S): 90 AEROSOL, METERED ORAL at 13:15

## 2023-12-20 RX ADMIN — APIXABAN 5 MILLIGRAM(S): 2.5 TABLET, FILM COATED ORAL at 17:10

## 2023-12-20 RX ADMIN — Medication 25 MILLIGRAM(S): at 14:38

## 2023-12-20 RX ADMIN — Medication 111.4 MILLIGRAM(S): at 19:40

## 2023-12-20 RX ADMIN — ALBUTEROL 2.5 MILLIGRAM(S): 90 AEROSOL, METERED ORAL at 08:12

## 2023-12-20 NOTE — PROGRESS NOTE ADULT - SUBJECTIVE AND OBJECTIVE BOX
seen for throat swelling     tolerating diet  no GI complaints  has chronic complaints of intermittent throat swelling and pruritus/rash (seems old, crusted lesions on lower legs/feet and hands)  sister at bedside     MEDICATIONS  (STANDING):  albuterol    0.083% 2.5 milliGRAM(s) Nebulizer every 6 hours  apixaban 5 milliGRAM(s) Oral every 12 hours  atorvastatin 40 milliGRAM(s) Oral at bedtime  pantoprazole  Injectable 40 milliGRAM(s) IV Push every 12 hours    MEDICATIONS  (PRN):  acetaminophen     Tablet .. 650 milliGRAM(s) Oral every 6 hours PRN Temp greater or equal to 38C (100.4F), Mild Pain (1 - 3)  ALPRAZolam 1 milliGRAM(s) Oral four times a day PRN for anxiety  aluminum hydroxide/magnesium hydroxide/simethicone Suspension 30 milliLiter(s) Oral every 4 hours PRN Dyspepsia  diphenhydrAMINE Injectable 25 milliGRAM(s) IV Push every 8 hours PRN Allergy symptoms  melatonin 3 milliGRAM(s) Oral at bedtime PRN Insomnia  ondansetron Injectable 4 milliGRAM(s) IV Push every 8 hours PRN Nausea and/or Vomiting      Allergies    No Known Allergies      Vital Signs Last 24 Hrs  T(C): 36.8 (20 Dec 2023 11:30), Max: 37 (19 Dec 2023 19:28)  T(F): 98.2 (20 Dec 2023 11:30), Max: 98.6 (19 Dec 2023 19:28)  HR: 74 (20 Dec 2023 11:30) (66 - 89)  BP: 129/67 (20 Dec 2023 11:30) (92/48 - 129/67)  BP(mean): --  RR: 18 (20 Dec 2023 11:30) (16 - 18)  SpO2: 98% (20 Dec 2023 11:30) (87% - 98%)    Parameters below as of 20 Dec 2023 11:30  Patient On (Oxygen Delivery Method): nasal cannula  O2 Flow (L/min): 5      PHYSICAL EXAM:    GENERAL: NAD  HEENT: no oral swelling noted   CHEST/LUNG: Clear to ausculation bilaterally  HEART: Regular rate and rhythm; S1 S2;  ABDOMEN: Soft, Nontender,  Bowel sounds present  EXTREMITIES no edema   NERVOUS SYSTEM:  Alert & Oriented X3,  Motor Strength 5/5 B/L upper and lower extremities  PSYCH: normal mood, appropriate response.    LABS:                        9.1    7.40  )-----------( 264      ( 20 Dec 2023 02:15 )             28.3     12-20    137  |  98  |  25.7<H>  ----------------------------<  155<H>  4.2   |  30.0<H>  |  1.08    Ca    8.3<L>      20 Dec 2023 02:15    TPro  5.8<L>  /  Alb  2.9<L>  /  TBili  0.5  /  DBili  x   /  AST  32<H>  /  ALT  15  /  AlkPhos  62  12-19    PT/INR - ( 18 Dec 2023 17:46 )   PT: 11.9 sec;   INR: 1.07 ratio         PTT - ( 18 Dec 2023 17:46 )  PTT:25.4 sec  Urinalysis Basic - ( 20 Dec 2023 02:15 )    Color: x / Appearance: x / SG: x / pH: x  Gluc: 155 mg/dL / Ketone: x  / Bili: x / Urobili: x   Blood: x / Protein: x / Nitrite: x   Leuk Esterase: x / RBC: x / WBC x   Sq Epi: x / Non Sq Epi: x / Bacteria: x        CAPILLARY BLOOD GLUCOSE            RADIOLOGY & ADDITIONAL TESTS:

## 2023-12-20 NOTE — CONSULT NOTE ADULT - ASSESSMENT
?Anaphylactic reaction:  -Patient is on methylprednisolone IV every 8 hours   - management as per primary team    Aortic thrombosis (arterial arteriosclerosis resulting in thrombosis requiring fasciotomy and thrombectomy in 01/2022):  -patient has been on Eliquis and antiplatelet therapy   -the patient is known to [facility] and in the past we suggested patient can be switched to prophylactic dose Eliquis but patient has been hesitant  -patient has reported on multiple occasions regarding on and off skin lesions, patient states in 2020 was bitten by sea anemone and since then ("because of the venom"), patient has had multiple inflammatory symptoms including skin lesions  -patient has seen multiple physicians in the past including rheumatologist, nephrologist, infectious disease specialist, cardiologist, neurologist, neurosurgeon, and cardiologist.  Patient has had multiple admissions including at [facility] in 09/2023, [facility] in 09/2023, [facility] in 09/2023, [facility] in 08/2023, [facility] in 07/2023    New onset anemia:  -labs on outpatient basis in Hematology office in 09/2023 demonstrated normal hemoglobin 14.1 with MCV 93 . Labs at outside hospital in Sept 2023 also demonstrated normal Hb and MCV  -now noted to have anemia with elevated MCV   -recommend sending B12, folate, retic count, LDH, haptoglobin, Chacha  -p.r.n. PRBC transfusion to keep hemoglobin more than 7  -patient should keep appointment with hematologist upon discharge (Dr. Larson at [facility])       ?Anaphylactic reaction:  -Patient is on methylprednisolone IV every 8 hours   - management as per primary team    Aortic thrombosis (arterial arteriosclerosis resulting in thrombosis requiring fasciotomy and thrombectomy in 01/2022):  -patient has been on Eliquis and antiplatelet therapy   -the patient is known to [facility] and in the past we suggested patient can be switched to prophylactic dose Eliquis but patient has been hesitant  -patient has reported on multiple occasions regarding on and off skin lesions, patient states in 2020 was bitten by sea anemone and since then ("because of the venom"), patient has had multiple inflammatory symptoms including skin lesions  -patient has seen multiple physicians in the past including rheumatologist, nephrologist, infectious disease specialist, cardiologist, neurologist, neurosurgeon, and cardiologist.  Patient has had multiple admissions including at [facility] in 09/2023, [facility] in 09/2023, [facility] in 09/2023, [facility] in 08/2023, [facility] in 07/2023    New onset anemia:  -labs on outpatient basis in Hematology office in 09/2023 demonstrated normal hemoglobin 14.1 with MCV 93 . Labs at outside hospital in Sept 2023 also demonstrated normal Hb and MCV  -now noted to have anemia with elevated MCV   -recommend sending B12, folate, retic count, LDH, haptoglobin, Chacha  -p.r.n. PRBC transfusion to keep hemoglobin more than 7  -patient should keep appointment with hematologist upon discharge (Dr. Larson at [facility])   61 y/o female w/ PMHx of C. diff, aortic thrombus (on Eliquis), anxiety,recurrent anaphylactic reaction with respiratory failure,delirium presenting for concern for throat swelling that has been ongoing for the last 2 days.HX from ED and EX . Pt is very confuse now, Does not want to talk. Per ex  ,pt was admitted to [facility] last week for anaphylactic reaction and she was confused during the hospital say.She had prior anaphylatic reaction and following with allergy specialist ,no specific cause wa found. Multiple medication chnaged recently .She was discharged with benadryl.Pt was c/o cough/itchy throal for last 2 days per EX- and she decided to come hospital last night.Per ED note pt was aaox3 last night BUT pt is very confuse now,getting angry,non coop,Pt states people are stealing her belongings. Pt seen with RA at bedside.Pt is talking off oxygen,desating. so2 92% now with 3 l oxygen    ?Anaphylactic reaction:  -Patient is on methylprednisolone IV every 8 hours   - management as per primary team  - would benefit from allergist on outpatient basis    Aortic thrombosis (arterial arteriosclerosis resulting in thrombosis requiring fasciotomy and thrombectomy in 01/2022):  -patient has been on Eliquis and antiplatelet therapy   -the patient is known to [facility] and in the past we suggested patient can be switched to prophylactic dose Eliquis but patient has been hesitant  -patient has reported on multiple occasions regarding on and off skin lesions, patient states in 2020 was bitten by sea anemone and since then ("because of the venom"), patient has had multiple inflammatory symptoms including skin lesions  -patient has seen multiple physicians in the past including rheumatologist, nephrologist, infectious disease specialist, cardiologist, neurologist, neurosurgeon, and cardiologist.  Patient has had multiple admissions including at [facility] in 09/2023, [facility] in 09/2023, [facility] in 09/2023, [facility] in 08/2023, [facility] in 07/2023    New onset anemia:  -labs on outpatient basis in Hematology office in 09/2023 demonstrated normal hemoglobin 14.1 with MCV 93 . Labs at outside hospital in Sept 2023 also demonstrated normal Hb and MCV  -now noted to have anemia with elevated MCV   -recommend sending B12, folate, retic count, LDH, haptoglobin, Chacha  -p.r.n. PRBC transfusion to keep hemoglobin more than 7  -patient should keep appointment with hematologist upon discharge (Dr. Larson at [facility])     63 y/o female w/ PMHx of C. diff, aortic thrombus (on Eliquis), anxiety,recurrent anaphylactic reaction with respiratory failure,delirium presenting for concern for throat swelling that has been ongoing for the last 2 days.HX from ED and EX . Pt is very confuse now, Does not want to talk. Per ex  ,pt was admitted to [facility] last week for anaphylactic reaction and she was confused during the hospital say.She had prior anaphylatic reaction and following with allergy specialist ,no specific cause wa found. Multiple medication chnaged recently .She was discharged with benadryl.Pt was c/o cough/itchy throal for last 2 days per EX- and she decided to come hospital last night.Per ED note pt was aaox3 last night BUT pt is very confuse now,getting angry,non coop,Pt states people are stealing her belongings. Pt seen with RA at bedside.Pt is talking off oxygen,desating. so2 92% now with 3 l oxygen    ?Anaphylactic reaction:  -Patient is on methylprednisolone IV every 8 hours   - management as per primary team  - would benefit from allergist on outpatient basis    Aortic thrombosis (arterial arteriosclerosis resulting in thrombosis requiring fasciotomy and thrombectomy in 01/2022):  -patient has been on Eliquis and antiplatelet therapy   -the patient is known to [facility] and in the past we suggested patient can be switched to prophylactic dose Eliquis but patient has been hesitant  -patient has reported on multiple occasions regarding on and off skin lesions, patient states in 2020 was bitten by sea anemone and since then ("because of the venom"), patient has had multiple inflammatory symptoms including skin lesions  -patient has seen multiple physicians in the past including rheumatologist, nephrologist, infectious disease specialist, cardiologist, neurologist, neurosurgeon, and cardiologist.  Patient has had multiple admissions including at [facility] in 09/2023, [facility] in 09/2023, [facility] in 09/2023, [facility] in 08/2023, [facility] in 07/2023    New onset anemia:  -labs on outpatient basis in Hematology office in 09/2023 demonstrated normal hemoglobin 14.1 with MCV 93 . Labs at outside hospital in Sept 2023 also demonstrated normal Hb and MCV  -now noted to have anemia with elevated MCV   -recommend sending B12, folate, retic count, LDH, haptoglobin, Chacha  -p.r.n. PRBC transfusion to keep hemoglobin more than 7  -patient should keep appointment with hematologist upon discharge (Dr. Larson at [facility])     63 y/o female w/ PMHx of C. diff, aortic thrombus (on Eliquis), anxiety,recurrent anaphylactic reaction with respiratory failure,delirium presenting for concern for throat swelling that has been ongoing for the last 2 days.HX from ED and EX . Pt is very confuse now, Does not want to talk. Per ex  ,pt was admitted to [facility] last week for anaphylactic reaction and she was confused during the hospital say.She had prior anaphylatic reaction and following with allergy specialist ,no specific cause wa found. Multiple medication chnaged recently .She was discharged with benadryl.Pt was c/o cough/itchy throal for last 2 days per EX- and she decided to come hospital last night.Per ED note pt was aaox3 last night BUT pt is very confuse now,getting angry,non coop,Pt states people are stealing her belongings. Pt seen with RA at bedside.Pt is talking off oxygen,desating. so2 92% now with 3 l oxygen    ?Anaphylactic reaction:  -Patient is on methylprednisolone IV every 8 hours   - management as per primary team  - would benefit from allergist on outpatient basis    Aortic thrombosis (arterial arteriosclerosis resulting in thrombosis requiring fasciotomy and thrombectomy in 01/2022):  -patient has been on Eliquis and antiplatelet therapy   -the patient is known to [facility] and in the past we suggested patient can be switched to prophylactic dose Eliquis but patient has been hesitant  -patient has reported on multiple occasions regarding on and off skin lesions, patient states in 2020 was bitten by sea anemone and since then ("because of the venom"), patient has had multiple inflammatory symptoms including skin lesions  -patient has seen multiple physicians in the past including rheumatologist, nephrologist, infectious disease specialist, cardiologist, neurologist, neurosurgeon, and cardiologist.  Patient has had multiple admissions including at [facility] in 09/2023, [facility] in 09/2023, [facility] in 09/2023, [facility] in 08/2023, [facility] in 07/2023    New onset anemia:  -labs on outpatient basis in Hematology office in 09/2023 demonstrated normal hemoglobin 14.1 with MCV 93 . Labs at outside hospital in Sept 2023 also demonstrated normal Hb and MCV  -now noted to have anemia with elevated MCV   -recommend sending B12, folate, retic count, LDH, haptoglobin, Chacha  -p.r.n. PRBC transfusion to keep hemoglobin more than 7  -patient should keep appointment with hematologist upon discharge (Dr. Larson at [facility])  -patient states she was commenced on Ivermectin and Dapsone by some physician on outpt basis (rheumatologist).  Patient has new onset anemia with elevated MCV, raising suspicion of hemolysis as the etiology of anemia/elevated MCV.  Patient should discontinue dapsone as patient might be having G6PD deficiency related hemolysis while on dapsone.  Would benefit from G6PD level testing on outpatient basis.     61 y/o female w/ PMHx of C. diff, aortic thrombus (on Eliquis), anxiety,recurrent anaphylactic reaction with respiratory failure,delirium presenting for concern for throat swelling that has been ongoing for the last 2 days.HX from ED and EX . Pt is very confuse now, Does not want to talk. Per ex  [facility] and she was confused during the hospital say.She had prior anaphylatic reaction and following with allergy specialist ,no specific cause wa found. Multiple medication chnaged recently .She was discharged with benadryl.Pt was c/o cough/itchy throal for last 2 days per EX- and she decided to come hospital last night.Per ED note pt was aaox3 last night BUT pt is very confuse now,getting angry,non coop,Pt states people are stealing her belongings. Pt seen with RA at bedside.Pt is talking off oxygen,desating. so2 92% now with 3 l oxygen    ?Anaphylactic reaction:  -Patient is on methylprednisolone IV every 8 hours   - management as per primary team  - would benefit from allergist on outpatient basis    Aortic thrombosis (arterial arteriosclerosis resulting in thrombosis requiring fasciotomy and thrombectomy in 01/2022):  -patient has been on Eliquis and antiplatelet therapy   -the patient is known to [facility] and in the past we suggested patient can be switched to prophylactic dose Eliquis but patient has been hesitant  -patient has reported on multiple occasions regarding on and off skin lesions, patient states in 2020 was bitten by sea anemone and since then ("because of the venom"), patient has had multiple inflammatory symptoms including skin lesions  -patient has seen multiple physicians in the past including rheumatologist, nephrologist, infectious disease specialist, cardiologist, neurologist, neurosurgeon, and cardiologist.  Patient has had multiple admissions including at [facility] in 09/2023, [facility] in 09/2023, [facility] in 09/2023, [facility] in 08/2023, [facility] in 07/2023    New onset anemia:  -labs on outpatient basis in Hematology office in 09/2023 demonstrated normal hemoglobin 14.1 with MCV 93 . Labs at outside hospital in Sept 2023 also demonstrated normal Hb and MCV  -now noted to have anemia with elevated MCV   -recommend sending B12, folate, retic count, LDH, haptoglobin, Chacha  -p.r.n. PRBC transfusion to keep hemoglobin more than 7  -patient should keep appointment with hematologist upon discharge (Dr. Larson at [facility])  -patient states she was commenced on Ivermectin and Dapsone by some physician on outpt basis (rheumatologist).  Patient has new onset anemia with elevated MCV, raising suspicion of hemolysis as the etiology of anemia/elevated MCV.  Patient should discontinue dapsone as patient might be having G6PD deficiency related hemolysis while on dapsone.  Would benefit from G6PD level testing on outpatient basis.

## 2023-12-20 NOTE — CONSULT NOTE ADULT - SUBJECTIVE AND OBJECTIVE BOX
Vital Signs Last 24 Hrs  T(C): 36.5 (20 Dec 2023 07:55), Max: 37 (19 Dec 2023 19:28)  T(F): 97.7 (20 Dec 2023 07:55), Max: 98.6 (19 Dec 2023 19:28)  HR: 69 (20 Dec 2023 07:55) (66 - 89)  BP: 126/75 (20 Dec 2023 07:55) (92/48 - 126/75)  BP(mean): --  RR: 18 (20 Dec 2023 07:55) (16 - 19)  SpO2: 98% (20 Dec 2023 07:55) (87% - 98%)    Parameters below as of 20 Dec 2023 07:55  Patient On (Oxygen Delivery Method): nasal cannula  O2 Flow (L/min): 5    CBC:            9.1    7.40  )-----------( 264      ( 12-20-23 @ 02:15 )             28.3     Chem:         ( 12-20-23 @ 02:15 )    137  |  98  |  25.7<H>  ----------------------------<  155<H>  4.2   |  30.0<H>  |  1.08        Liver Functions: ( 12-19-23 @ 09:05 )  Alb: 2.9 g/dL / Pro: 5.8 g/dL / ALK PHOS: 62 U/L / ALT: 15 U/L / AST: 32 U/L / GGT: x              Type & Screen:     MEDICATIONS  (STANDING):  albuterol    0.083% 2.5 milliGRAM(s) Nebulizer every 6 hours  apixaban 5 milliGRAM(s) Oral every 12 hours  atorvastatin 40 milliGRAM(s) Oral at bedtime  dextrose 5% + sodium chloride 0.9%. 1000 milliLiter(s) (75 mL/Hr) IV Continuous <Continuous>  methylPREDNISolone sodium succinate Injectable 60 milliGRAM(s) IV Push every 8 hours  pantoprazole  Injectable 40 milliGRAM(s) IV Push every 12 hours       63 y/o female w/ PMHx of C. diff, aortic thrombus (on Eliquis), anxiety,recurrent anaphylactic reaction with respiratory failure,delirium presenting for concern for throat swelling that has been ongoing for the last 2 days.HX from ED and EX . Pt is very confuse now, Does not want to talk. Per ex  ,pt was admitted to [facility] last week for anaphylactic reaction and she was confused during the hospital say.She had prior anaphylatic reaction and following with allergy specialist ,no specific cause wa found. Multiple medication chnaged recently .She was discharged with benadryl.Pt was c/o cough/itchy throal for last 2 days per EX- and she decided to come hospital last night.Per ED note pt was aaox3 last night BUT pt is very confuse now,getting angry,non coop,Pt states people are stealing her belongings. Pt seen with RA at bedside.Pt is talking off oxygen,desating. so2 92% now with 3 l oxygen.        Vital Signs Last 24 Hrs  T(C): 36.5 (20 Dec 2023 07:55), Max: 37 (19 Dec 2023 19:28)  T(F): 97.7 (20 Dec 2023 07:55), Max: 98.6 (19 Dec 2023 19:28)  HR: 69 (20 Dec 2023 07:55) (66 - 89)  BP: 126/75 (20 Dec 2023 07:55) (92/48 - 126/75)  BP(mean): --  RR: 18 (20 Dec 2023 07:55) (16 - 19)  SpO2: 98% (20 Dec 2023 07:55) (87% - 98%)    Parameters below as of 20 Dec 2023 07:55  Patient On (Oxygen Delivery Method): nasal cannula  O2 Flow (L/min): 5    CBC:            9.1    7.40  )-----------( 264      ( 12-20-23 @ 02:15 )             28.3     Chem:         ( 12-20-23 @ 02:15 )    137  |  98  |  25.7<H>  ----------------------------<  155<H>  4.2   |  30.0<H>  |  1.08        Liver Functions: ( 12-19-23 @ 09:05 )  Alb: 2.9 g/dL / Pro: 5.8 g/dL / ALK PHOS: 62 U/L / ALT: 15 U/L / AST: 32 U/L / GGT: x              Type & Screen:     MEDICATIONS  (STANDING):  albuterol    0.083% 2.5 milliGRAM(s) Nebulizer every 6 hours  apixaban 5 milliGRAM(s) Oral every 12 hours  atorvastatin 40 milliGRAM(s) Oral at bedtime  dextrose 5% + sodium chloride 0.9%. 1000 milliLiter(s) (75 mL/Hr) IV Continuous <Continuous>  methylPREDNISolone sodium succinate Injectable 60 milliGRAM(s) IV Push every 8 hours  pantoprazole  Injectable 40 milliGRAM(s) IV Push every 12 hours    Physical examination:  Alert awake oriented. In no apparent distress, breathing comfortably  HEENT: no cervical lymphadenopathy  Chest:  Clear to auscultation bilaterally  CVS:  No murmur/rubs/gallops; regular rate and rhythm  Abdomen:  Nondistended, nontender, bowel sounds normal, no organomegaly  Extremities:  No pedal edema  Neurological: no focal neurolgical deficit  61 y/o female w/ PMHx of C. diff, aortic thrombus (on Eliquis), anxiety,recurrent anaphylactic reaction with respiratory failure,delirium presenting for concern for throat swelling that has been ongoing for the last 2 days.HX from ED and EX . Pt is very confuse now, Does not want to talk. Per ex  ,pt was admitted to [facility] last week for anaphylactic reaction and she was confused during the hospital say.She had prior anaphylatic reaction and following with allergy specialist ,no specific cause wa found. Multiple medication chnaged recently .She was discharged with benadryl.Pt was c/o cough/itchy throal for last 2 days per EX- and she decided to come hospital last night.Per ED note pt was aaox3 last night BUT pt is very confuse now,getting angry,non coop,Pt states people are stealing her belongings. Pt seen with RA at bedside.Pt is talking off oxygen,desating. so2 92% now with 3 l oxygen.        Vital Signs Last 24 Hrs  T(C): 36.5 (20 Dec 2023 07:55), Max: 37 (19 Dec 2023 19:28)  T(F): 97.7 (20 Dec 2023 07:55), Max: 98.6 (19 Dec 2023 19:28)  HR: 69 (20 Dec 2023 07:55) (66 - 89)  BP: 126/75 (20 Dec 2023 07:55) (92/48 - 126/75)  BP(mean): --  RR: 18 (20 Dec 2023 07:55) (16 - 19)  SpO2: 98% (20 Dec 2023 07:55) (87% - 98%)    Parameters below as of 20 Dec 2023 07:55  Patient On (Oxygen Delivery Method): nasal cannula  O2 Flow (L/min): 5    CBC:            9.1    7.40  )-----------( 264      ( 12-20-23 @ 02:15 )             28.3     Chem:         ( 12-20-23 @ 02:15 )    137  |  98  |  25.7<H>  ----------------------------<  155<H>  4.2   |  30.0<H>  |  1.08        Liver Functions: ( 12-19-23 @ 09:05 )  Alb: 2.9 g/dL / Pro: 5.8 g/dL / ALK PHOS: 62 U/L / ALT: 15 U/L / AST: 32 U/L / GGT: x              Type & Screen:     MEDICATIONS  (STANDING):  albuterol    0.083% 2.5 milliGRAM(s) Nebulizer every 6 hours  apixaban 5 milliGRAM(s) Oral every 12 hours  atorvastatin 40 milliGRAM(s) Oral at bedtime  dextrose 5% + sodium chloride 0.9%. 1000 milliLiter(s) (75 mL/Hr) IV Continuous <Continuous>  methylPREDNISolone sodium succinate Injectable 60 milliGRAM(s) IV Push every 8 hours  pantoprazole  Injectable 40 milliGRAM(s) IV Push every 12 hours    Physical examination:  Alert awake oriented. In no apparent distress, breathing comfortably  HEENT: no cervical lymphadenopathy  Chest:  Clear to auscultation bilaterally  CVS:  No murmur/rubs/gallops; regular rate and rhythm  Abdomen:  Nondistended, nontender, bowel sounds normal, no organomegaly  Extremities:  No pedal edema  Neurological: no focal neurolgical deficit  63 y/o female w/ PMHx of C. diff, aortic thrombus (on Eliquis), anxiety,recurrent anaphylactic reaction with respiratory failure,delirium presenting for concern for throat swelling that has been ongoing for the last 2 days.HX from ED and EX . Pt is very confuse now, Does not want to talk. Per ex  ,pt was admitted to [facility] last week for anaphylactic reaction and she was confused during the hospital say.She had prior anaphylatic reaction and following with allergy specialist ,no specific cause wa found. Multiple medication chnaged recently .She was discharged with benadryl.Pt was c/o cough/itchy throal for last 2 days per EX- and she decided to come hospital last night.Per ED note pt was aaox3 last night BUT pt is very confuse now,getting angry,non coop,Pt states people are stealing her belongings. Pt seen with RA at bedside.Pt is talking off oxygen,desating. so2 92% now with 3 l oxygen.        Vital Signs Last 24 Hrs  T(C): 36.5 (20 Dec 2023 07:55), Max: 37 (19 Dec 2023 19:28)  T(F): 97.7 (20 Dec 2023 07:55), Max: 98.6 (19 Dec 2023 19:28)  HR: 69 (20 Dec 2023 07:55) (66 - 89)  BP: 126/75 (20 Dec 2023 07:55) (92/48 - 126/75)  BP(mean): --  RR: 18 (20 Dec 2023 07:55) (16 - 19)  SpO2: 98% (20 Dec 2023 07:55) (87% - 98%)    Parameters below as of 20 Dec 2023 07:55  Patient On (Oxygen Delivery Method): nasal cannula  O2 Flow (L/min): 5    CBC:            9.1    7.40  )-----------( 264      ( 12-20-23 @ 02:15 )             28.3     Chem:         ( 12-20-23 @ 02:15 )    137  |  98  |  25.7<H>  ----------------------------<  155<H>  4.2   |  30.0<H>  |  1.08        Liver Functions: ( 12-19-23 @ 09:05 )  Alb: 2.9 g/dL / Pro: 5.8 g/dL / ALK PHOS: 62 U/L / ALT: 15 U/L / AST: 32 U/L / GGT:           Type & Screen:     MEDICATIONS  (STANDING):  albuterol    0.083% 2.5 milliGRAM(s) Nebulizer every 6 hours  apixaban 5 milliGRAM(s) Oral every 12 hours  atorvastatin 40 milliGRAM(s) Oral at bedtime  dextrose 5% + sodium chloride 0.9%. 1000 milliLiter(s) (75 mL/Hr) IV Continuous <Continuous>  methylPREDNISolone sodium succinate Injectable 60 milliGRAM(s) IV Push every 8 hours  pantoprazole  Injectable 40 milliGRAM(s) IV Push every 12 hours    Physical examination:  Alert awake oriented. In no apparent distress, breathing comfortably  HEENT: no cervical lymphadenopathy  Chest:  Clear to auscultation bilaterally  CVS:  No murmur/rubs/gallops; regular rate and rhythm  Abdomen:  Nondistended, nontender, bowel sounds normal, no organomegaly  Extremities:  No pedal edema  Neurological: no focal neurolgical deficit  63 y/o female w/ PMHx of C. diff, aortic thrombus (on Eliquis), anxiety,recurrent anaphylactic reaction with respiratory failure,delirium presenting for concern for throat swelling that has been ongoing for the last 2 days.HX from ED and EX . Pt is very confuse now, Does not want to talk. Per ex  ,pt was admitted to [facility] last week for anaphylactic reaction and she was confused during the hospital say.She had prior anaphylatic reaction and following with allergy specialist ,no specific cause wa found. Multiple medication chnaged recently .She was discharged with benadryl.Pt was c/o cough/itchy throal for last 2 days per EX- and she decided to come hospital last night.Per ED note pt was aaox3 last night BUT pt is very confuse now,getting angry,non coop,Pt states people are stealing her belongings. Pt seen with RA at bedside.Pt is talking off oxygen,desating. so2 92% now with 3 l oxygen.        Vital Signs Last 24 Hrs  T(C): 36.5 (20 Dec 2023 07:55), Max: 37 (19 Dec 2023 19:28)  T(F): 97.7 (20 Dec 2023 07:55), Max: 98.6 (19 Dec 2023 19:28)  HR: 69 (20 Dec 2023 07:55) (66 - 89)  BP: 126/75 (20 Dec 2023 07:55) (92/48 - 126/75)  BP(mean): --  RR: 18 (20 Dec 2023 07:55) (16 - 19)  SpO2: 98% (20 Dec 2023 07:55) (87% - 98%)    Parameters below as of 20 Dec 2023 07:55  Patient On (Oxygen Delivery Method): nasal cannula  O2 Flow (L/min): 5    CBC:            9.1    7.40  )-----------( 264      ( 12-20-23 @ 02:15 )             28.3     Chem:         ( 12-20-23 @ 02:15 )    137  |  98  |  25.7<H>  ----------------------------<  155<H>  4.2   |  30.0<H>  |  1.08        Liver Functions: ( 12-19-23 @ 09:05 )  Alb: 2.9 g/dL / Pro: 5.8 g/dL / ALK PHOS: 62 U/L / ALT: 15 U/L / AST: 32 U/L / GGT:           Type & Screen:     MEDICATIONS  (STANDING):  albuterol    0.083% 2.5 milliGRAM(s) Nebulizer every 6 hours  apixaban 5 milliGRAM(s) Oral every 12 hours  atorvastatin 40 milliGRAM(s) Oral at bedtime  dextrose 5% + sodium chloride 0.9%. 1000 milliLiter(s) (75 mL/Hr) IV Continuous <Continuous>  methylPREDNISolone sodium succinate Injectable 60 milliGRAM(s) IV Push every 8 hours  pantoprazole  Injectable 40 milliGRAM(s) IV Push every 12 hours    Physical examination:  Alert awake oriented. In no apparent distress, breathing comfortably  HEENT: no cervical lymphadenopathy  Chest:  Clear to auscultation bilaterally  CVS:  No murmur/rubs/gallops; regular rate and rhythm  Abdomen:  Nondistended, nontender, bowel sounds normal, no organomegaly  Extremities:  No pedal edema  Neurological: no focal neurolgical deficit

## 2023-12-20 NOTE — CONSULT NOTE ADULT - PROBLEM SELECTOR RECOMMENDATION 9
-No signs of acute angioedema despite pt with persistent feeling of throat swelling and difficulty swallowing  -No palpable soft tissue swelling or masses, however, pt is adamant about her discomfort- may obtain CT neck w contrast as no recent neck imaging done.  -May use nasal saline sprays at least 4 times a day to alleviate mucous symptoms, although no signs of acute sinusitis on nasal endoscopy or CT head -No signs of acute angioedema despite pt with persistent feeling of throat swelling and difficulty swallowing  -No palpable soft tissue swelling or masses, however, pt is adamant about her discomfort- may obtain CT neck w contrast as no recent neck imaging done.  -May use nasal saline sprays at least 4 times a day to alleviate mucous symptoms, although no signs of acute sinusitis on nasal endoscopy or CT head  -Continue antireflux medications

## 2023-12-20 NOTE — PROGRESS NOTE ADULT - ASSESSMENT
Currently no acute respiratory issues, no stridor, no bronchospasm  Complaining of swallowing issues  CTA without PE or acute lung infiltrate, stable 4 mm nodule OLIMPIA from 7/23  Hx Recurrent idiopathic angioedema, ? G6PD ( LDH elevated, Bili normal)  Would wean medrol, prednisone taper- would keep on low dose till seen by allergy given hx, non sedating anti histamine  haptoglobin normal, but LDH, polychromasia suspicious for hemolysis- G6PD pending  Met hemoglobin level 13 slowly decreasing, on no meds that classically precipitate, Called Toxicology [facility] await feedback  GI input re bowel edema/ duodenitis , swallowing  sp02 90% RA, continue nebs, medrol, incentive spirometry  Continue follow CT scans per guidelines and smoking cessation     Currently no acute respiratory issues, no stridor, no bronchospasm  Complaining of swallowing issues  CTA without PE or acute lung infiltrate, stable 4 mm nodule OLIMPIA from 7/23  Hx Recurrent idiopathic angioedema, ? G6PD ( LDH elevated, Bili normal)  Would wean medrol, prednisone taper- would keep on low dose till seen by allergy given hx, non sedating anti histamine  haptoglobin normal, but LDH, polychromasia suspicious for hemolysis- G6PD pending  Met hemoglobin level 13 slowly decreasing, on no meds that classically precipitate, Called T[facility] await feedback  GI input re bowel edema/ duodenitis , swallowing  sp02 91% RA, continue nebs, medrol, incentive spirometry  Continue follow CT scans per guidelines and smoking cessation  I have contacted her immunologist Dr James ( [facility] [place] [facility]) who by hx was prescribing methylene blue, await his call to better understand his plan     Currently no acute respiratory issues, no stridor, no bronchospasm  Complaining of swallowing issues  CTA without PE or acute lung infiltrate, stable 4 mm nodule OLIMPIA from 7/23  Hx Recurrent idiopathic angioedema, ? G6PD ( LDH elevated, Bili normal)  Would wean medrol, prednisone taper- would keep on low dose till seen by allergy given hx, non sedating anti histamine  haptoglobin normal, but LDH, polychromasia suspicious for hemolysis- G6PD pending  Met hemoglobin level 13 slowly decreasing, on no meds that classically precipitate, Called T[facility] await feedback  GI input re bowel edema/ duodenitis , swallowing  sp02 91% RA, continue nebs, medrol, incentive spirometry  Continue follow CT scans per guidelines and smoking cessation  I have contacted her immunologist Dr James ( [facility] [place] [facility]) who by hx was prescribing methylene blue, await his call to better understand his plan     Currently no acute respiratory issues, no stridor, no bronchospasm  Complaining of swallowing issues  CTA without PE or acute lung infiltrate, stable 4 mm nodule OLIMPIA from 7/23  Hx Recurrent idiopathic angioedema, ? G6PD ( LDH elevated, Bili normal)  Would wean medrol, prednisone taper- would keep on low dose till seen by allergy given hx, non sedating anti histamine  haptoglobin normal, but LDH, polychromasia suspicious for hemolysis- G6PD pending  Met hemoglobin level 13 slowly decreasing, on no meds that classically precipitate, Called Toxicology [facility] await feedback  GI input re bowel edema/ duodenitis , swallowing  sp02 91% RA, continue nebs, medrol, incentive spirometry  Continue follow CT scans per guidelines and smoking cessation  I have contacted her immunologist Dr James ( [facility]) who by hx was prescribing methylene blue, await his call to better understand his plan    Spoke with PMD, on methyelen blue low dose 1 mg for sea anemone bite ( G6PD level in office was normal)  Spoke with Toxicology, they are not sure what is driving met hb, no need for rx at this time      Currently no acute respiratory issues, no stridor, no bronchospasm  Complaining of swallowing issues  CTA without PE or acute lung infiltrate, stable 4 mm nodule OLIMPIA from 7/23  Hx Recurrent idiopathic angioedema, ? G6PD ( LDH elevated, Bili normal)  Would wean medrol, prednisone taper- would keep on low dose till seen by allergy given hx, non sedating anti histamine  haptoglobin normal, but LDH, polychromasia suspicious for hemolysis- G6PD pending  Met hemoglobin level 13 slowly decreasing, on no meds that classically precipitate, Called Toxicology [facility] await feedback  GI input re bowel edema/ duodenitis , swallowing  sp02 91% RA, continue nebs, medrol, incentive spirometry  Continue follow CT scans per guidelines and smoking cessation  I have contacted her immunologist Dr James ( [facility]) who by hx was prescribing methylene blue, await his call to better understand his plan    Spoke with PMD, on methyelen blue low dose 1 mg for sea anemone bite ( G6PD level in office was normal)  Spoke with Toxicology, they are not sure what is driving met hb, no need for rx at this time
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
## 2023-12-20 NOTE — PROGRESS NOTE ADULT - ASSESSMENT
61 y/o female w/ PMHx of C. 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 Pt is very confuse now, Does not want to talk. Per ex  ,pt was admitted to [facility] last week for anaphylactic reaction and she was confused during the hospital say. She had prior anaphylatic reaction and following with allergy specialist ,no specific cause was found. Multiple medication changed recently .She was discharged with benadryl. Pt was c/o cough/itchy throat for last 2 days per EX- and she decided to come hospital last night.. Per ED note pt was aaox3 last night BUT pt is very confuse now,     Acute hypoxic respiratory failure   concern allergic reaction  hx recurrent angioedema?  unclear cause  -oxygen supplement  -Iv solumedrol ,benadryl--> po pred  ENT eval  pulm following    acute encephalopathy resolved   -unclear etiology  -? delirium  tox screen   -per chart/ex-, pt was confuse during hospital stay last week at [facility] as well prior hospital admission  CT head negative     hx Esophagitis/ duodenitis  likely gastroenteritis  -ct abd/pelvis reviewed  -PPi for now  -f/u gi out pt  resolved     Hyperlipidemia:  -Continue statin.    Arterial thrombosis/DVT:  - On Eliquis    macrocytic anemia: check b12/folate and other tests per onc    HOLD dapson as can cause hemolysis    haptoglobin wnl      methemoglobinemia: trend, unclear etiology    per poison control monitor    on methylene blue as outpatient     patient states she is on dapsone, methylene blue, naltrexone po as outpatient but does not know why. she is asking for answers for her chronic conditions.  advised sister to accompany patient to her Dr's appointments to inquire official diagnosis and to stick to one hospital system to ensure availability of test results/charts    possible dc in am   63 y/o female w/ PMHx of C. 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 Pt is very confuse now, Does not want to talk. Per ex  ,pt was admitted to [facility] last week for anaphylactic reaction and she was confused during the hospital say. She had prior anaphylatic reaction and following with allergy specialist ,no specific cause was found. Multiple medication changed recently .She was discharged with benadryl. Pt was c/o cough/itchy throat for last 2 days per EX- and she decided to come hospital last night.. Per ED note pt was aaox3 last night BUT pt is very confuse now,     Acute hypoxic respiratory failure   concern allergic reaction  hx recurrent angioedema?  unclear cause  -oxygen supplement  -Iv solumedrol ,benadryl--> po pred  ENT eval  pulm following    acute encephalopathy resolved   -unclear etiology  -? delirium  tox screen   -per chart/ex-, pt was confuse during hospital stay last week at [facility] as well prior hospital admission  CT head negative     hx Esophagitis/ duodenitis  likely gastroenteritis  -ct abd/pelvis reviewed  -PPi for now  -f/u gi out pt  resolved     Hyperlipidemia:  -Continue statin.    Arterial thrombosis/DVT:  - On Eliquis    macrocytic anemia: check b12/folate and other tests per onc    HOLD dapson as can cause hemolysis    haptoglobin wnl      methemoglobinemia: trend, unclear etiology    per poison control monitor    on methylene blue as outpatient     patient states she is on dapsone, methylene blue, naltrexone po as outpatient but does not know why. she is asking for answers for her chronic conditions.  advised sister to accompany patient to her Dr's appointments to inquire official diagnosis and to stick to one hospital system to ensure availability of test results/charts    possible dc in am

## 2023-12-20 NOTE — CONSULT NOTE ADULT - ASSESSMENT
64 yo F complaining of 3 years of recurrent throat swelling. Has seen an allergist with no diagnosis. On exam today no lip or tongue swelling. Laryngoscopy WNL, only abnomrla finding includes pachydermia and post cricoid erythema suggesting reflux. CT head reviewed and sinuses are clear.  62 yo F complaining of 3 years of recurrent throat swelling. Has seen an allergist with no diagnosis. On exam today no lip or tongue swelling. Laryngoscopy WNL, only abnomrla finding includes pachydermia and post cricoid erythema suggesting reflux. CT head reviewed and sinuses are clear.

## 2023-12-20 NOTE — CHART NOTE - NSCHARTNOTEFT_GEN_A_CORE
INTERVAL HX: Notified by Dr. Lizandro Monroy, medical toxicology fellow, to access patient for any signs/symptoms of methemoglobinemia   Patient is a 63 year old female with PMH of C. diff, aortic thrombus (on Eliquis), anxiety, recurrent anaphylactic reaction with respiratory failure, delirium; presented to the ED for concern for throat swelling; admitted to medicine for acute hypoxic respiratory failure with history of angioedema and  methemoglobinemia on methylene blue as outpatient.   Patient was seen and evaluated at bedside. Patient without any current complaints, comfortable on 5L NC. +diffuse rash on back, arms, abdomen, legs, and feet b/l. Previously pruritic, denies current pruritis. Denies bleeding, discharge or purulence from rash.  Denies current lightheadedness, dizziness, weakness, syncope, weakness, headache, or cyanosis/gray skin.     VITALS   T(C): 36.8   HR: 75 regular   BP: 111/57  RR: 18 unlabored   SpO2: 96% on 5L NC    PHYSICAL EXAM  CONSTITUTIONAL: Patient sitting comfortably in bed, no apparent distress  EYES: PERRLA and symmetric, EOMI, No conjunctival or scleral injection, non-icteric  RESP: No respiratory distress, no use of accessory muscles; CTA b/l, no WRR  CV: RRR, +S1S2  SKIN: + diffuse raised maculopapular rash noted on back, abdomen, b/l UE and b/l LE. No active bleeding, discharge, or purulence noted   NEURO: A&Ox4, no focal deficits     LABS   Methemoglobin, Venous: 17.2 % (12.20.23 @ 17:22)     A/P: 63 year old female with PMH of C. diff, aortic thrombus (on Eliquis), anxiety, recurrent anaphylactic reaction with respiratory failure, delirium; presented to the ED for concern for throat swelling; admitted to medicine for acute hypoxic respiratory failure with history of angioedema and  methemoglobinemia on methylene blue as outpatient; seen today for assessment of methemoglobinemia as per toxicology fellow, VSS     Assessment of methemoglobinemia   -Discussed assessment with toxicology fellow, Dr. Lizandro Monroy  -IV methylene blue 1mg/kg IVPB stat ordered as directed by Dr. Lizandro Monroy   -repeat methemoglobin ordered for midnight at 6hour follow-up     Discussed plan with Dr. Lizandro Monroy, Night medicine PA, and Patient. Patient demonstrated understanding of plan and all questions/concerns addressed   Will continue to monitor   RN to notify provider with any changes in patient status

## 2023-12-21 ENCOUNTER — TRANSCRIPTION ENCOUNTER (OUTPATIENT)
Age: 63
End: 2023-12-21

## 2023-12-21 VITALS
RESPIRATION RATE: 18 BRPM | OXYGEN SATURATION: 92 % | SYSTOLIC BLOOD PRESSURE: 115 MMHG | TEMPERATURE: 98 F | HEART RATE: 73 BPM | DIASTOLIC BLOOD PRESSURE: 52 MMHG

## 2023-12-21 LAB
BLOOD GAS SOURCE: SIGNIFICANT CHANGE UP
FOLATE SERPL-MCNC: >20 NG/ML — SIGNIFICANT CHANGE UP
FOLATE SERPL-MCNC: >20 NG/ML — SIGNIFICANT CHANGE UP
HAPTOGLOB SERPL-MCNC: 75 MG/DL — SIGNIFICANT CHANGE UP (ref 34–200)
HAPTOGLOB SERPL-MCNC: 75 MG/DL — SIGNIFICANT CHANGE UP (ref 34–200)
HCT VFR BLD CALC: 27.6 % — LOW (ref 34.5–45)
HCT VFR BLD CALC: 27.6 % — LOW (ref 34.5–45)
HGB BLD CALC-MCNC: 10.5 G/DL — LOW (ref 11.7–16.1)
HGB BLD CALC-MCNC: 10.5 G/DL — LOW (ref 11.7–16.1)
HGB BLD CALC-MCNC: 9.3 G/DL — LOW (ref 11.7–16.1)
HGB BLD CALC-MCNC: 9.3 G/DL — LOW (ref 11.7–16.1)
HGB BLD CALC-MCNC: 9.7 G/DL — LOW (ref 11.7–16.1)
HGB BLD CALC-MCNC: 9.7 G/DL — LOW (ref 11.7–16.1)
HGB BLD-MCNC: 8.6 G/DL — LOW (ref 11.5–15.5)
HGB BLD-MCNC: 8.6 G/DL — LOW (ref 11.5–15.5)
LDH SERPL L TO P-CCNC: 393 U/L — HIGH (ref 98–192)
LDH SERPL L TO P-CCNC: 393 U/L — HIGH (ref 98–192)
MCHC RBC-ENTMCNC: 31.2 GM/DL — LOW (ref 32–36)
MCHC RBC-ENTMCNC: 31.2 GM/DL — LOW (ref 32–36)
MCHC RBC-ENTMCNC: 34 PG — SIGNIFICANT CHANGE UP (ref 27–34)
MCHC RBC-ENTMCNC: 34 PG — SIGNIFICANT CHANGE UP (ref 27–34)
MCV RBC AUTO: 109.1 FL — HIGH (ref 80–100)
MCV RBC AUTO: 109.1 FL — HIGH (ref 80–100)
METHGB MFR BLDV: 4.9 % — HIGH
METHGB MFR BLDV: 4.9 % — HIGH
METHGB MFR BLDV: 6.9 % — HIGH
METHGB MFR BLDV: 6.9 % — HIGH
METHGB MFR BLDV: 7.6 % — HIGH
METHGB MFR BLDV: 7.6 % — HIGH
PLATELET # BLD AUTO: 290 K/UL — SIGNIFICANT CHANGE UP (ref 150–400)
PLATELET # BLD AUTO: 290 K/UL — SIGNIFICANT CHANGE UP (ref 150–400)
RBC # BLD: 2.53 M/UL — LOW (ref 3.8–5.2)
RBC # FLD: 13.8 % — SIGNIFICANT CHANGE UP (ref 10.3–14.5)
RBC # FLD: 13.8 % — SIGNIFICANT CHANGE UP (ref 10.3–14.5)
RETICS #: 249.2 K/UL — HIGH (ref 25–125)
RETICS #: 249.2 K/UL — HIGH (ref 25–125)
RETICS/RBC NFR: 9.9 % — HIGH (ref 0.5–2.5)
RETICS/RBC NFR: 9.9 % — HIGH (ref 0.5–2.5)
VIT B12 SERPL-MCNC: 502 PG/ML — SIGNIFICANT CHANGE UP (ref 232–1245)
VIT B12 SERPL-MCNC: 502 PG/ML — SIGNIFICANT CHANGE UP (ref 232–1245)
WBC # BLD: 7.28 K/UL — SIGNIFICANT CHANGE UP (ref 3.8–10.5)
WBC # BLD: 7.28 K/UL — SIGNIFICANT CHANGE UP (ref 3.8–10.5)
WBC # FLD AUTO: 7.28 K/UL — SIGNIFICANT CHANGE UP (ref 3.8–10.5)
WBC # FLD AUTO: 7.28 K/UL — SIGNIFICANT CHANGE UP (ref 3.8–10.5)

## 2023-12-21 PROCEDURE — 93005 ELECTROCARDIOGRAM TRACING: CPT

## 2023-12-21 PROCEDURE — 36415 COLL VENOUS BLD VENIPUNCTURE: CPT

## 2023-12-21 PROCEDURE — 74177 CT ABD & PELVIS W/CONTRAST: CPT | Mod: MA

## 2023-12-21 PROCEDURE — 87798 DETECT AGENT NOS DNA AMP: CPT

## 2023-12-21 PROCEDURE — 82746 ASSAY OF FOLIC ACID SERUM: CPT

## 2023-12-21 PROCEDURE — 82955 ASSAY OF G6PD ENZYME: CPT

## 2023-12-21 PROCEDURE — 86901 BLOOD TYPING SEROLOGIC RH(D): CPT

## 2023-12-21 PROCEDURE — 93970 EXTREMITY STUDY: CPT

## 2023-12-21 PROCEDURE — 83605 ASSAY OF LACTIC ACID: CPT

## 2023-12-21 PROCEDURE — 81001 URINALYSIS AUTO W/SCOPE: CPT

## 2023-12-21 PROCEDURE — 94760 N-INVAS EAR/PLS OXIMETRY 1: CPT

## 2023-12-21 PROCEDURE — 82947 ASSAY GLUCOSE BLOOD QUANT: CPT

## 2023-12-21 PROCEDURE — 86850 RBC ANTIBODY SCREEN: CPT

## 2023-12-21 PROCEDURE — 86900 BLOOD TYPING SEROLOGIC ABO: CPT

## 2023-12-21 PROCEDURE — 87186 SC STD MICRODIL/AGAR DIL: CPT

## 2023-12-21 PROCEDURE — 94640 AIRWAY INHALATION TREATMENT: CPT

## 2023-12-21 PROCEDURE — 80053 COMPREHEN METABOLIC PANEL: CPT

## 2023-12-21 PROCEDURE — 85027 COMPLETE CBC AUTOMATED: CPT

## 2023-12-21 PROCEDURE — 83010 ASSAY OF HAPTOGLOBIN QUANT: CPT

## 2023-12-21 PROCEDURE — 87651 STREP A DNA AMP PROBE: CPT

## 2023-12-21 PROCEDURE — 83880 ASSAY OF NATRIURETIC PEPTIDE: CPT

## 2023-12-21 PROCEDURE — 83615 LACTATE (LD) (LDH) ENZYME: CPT

## 2023-12-21 PROCEDURE — 85610 PROTHROMBIN TIME: CPT

## 2023-12-21 PROCEDURE — 99285 EMERGENCY DEPT VISIT HI MDM: CPT | Mod: 25

## 2023-12-21 PROCEDURE — 82435 ASSAY OF BLOOD CHLORIDE: CPT

## 2023-12-21 PROCEDURE — 71275 CT ANGIOGRAPHY CHEST: CPT | Mod: MA

## 2023-12-21 PROCEDURE — 85014 HEMATOCRIT: CPT

## 2023-12-21 PROCEDURE — 82550 ASSAY OF CK (CPK): CPT

## 2023-12-21 PROCEDURE — 85025 COMPLETE CBC W/AUTO DIFF WBC: CPT

## 2023-12-21 PROCEDURE — 82330 ASSAY OF CALCIUM: CPT

## 2023-12-21 PROCEDURE — 85018 HEMOGLOBIN: CPT

## 2023-12-21 PROCEDURE — 85730 THROMBOPLASTIN TIME PARTIAL: CPT

## 2023-12-21 PROCEDURE — 71045 X-RAY EXAM CHEST 1 VIEW: CPT

## 2023-12-21 PROCEDURE — 85045 AUTOMATED RETICULOCYTE COUNT: CPT

## 2023-12-21 PROCEDURE — 87086 URINE CULTURE/COLONY COUNT: CPT

## 2023-12-21 PROCEDURE — 83690 ASSAY OF LIPASE: CPT

## 2023-12-21 PROCEDURE — 82803 BLOOD GASES ANY COMBINATION: CPT

## 2023-12-21 PROCEDURE — 0225U NFCT DS DNA&RNA 21 SARSCOV2: CPT

## 2023-12-21 PROCEDURE — 82607 VITAMIN B-12: CPT

## 2023-12-21 PROCEDURE — 99239 HOSP IP/OBS DSCHRG MGMT >30: CPT

## 2023-12-21 PROCEDURE — 70450 CT HEAD/BRAIN W/O DYE: CPT

## 2023-12-21 PROCEDURE — 83050 HGB METHEMOGLOBIN QUAN: CPT

## 2023-12-21 PROCEDURE — 96375 TX/PRO/DX INJ NEW DRUG ADDON: CPT

## 2023-12-21 PROCEDURE — 84484 ASSAY OF TROPONIN QUANT: CPT

## 2023-12-21 PROCEDURE — 84295 ASSAY OF SERUM SODIUM: CPT

## 2023-12-21 PROCEDURE — 82140 ASSAY OF AMMONIA: CPT

## 2023-12-21 PROCEDURE — 87077 CULTURE AEROBIC IDENTIFY: CPT

## 2023-12-21 PROCEDURE — 80048 BASIC METABOLIC PNL TOTAL CA: CPT

## 2023-12-21 PROCEDURE — 96374 THER/PROPH/DIAG INJ IV PUSH: CPT

## 2023-12-21 PROCEDURE — 84132 ASSAY OF SERUM POTASSIUM: CPT

## 2023-12-21 PROCEDURE — 85379 FIBRIN DEGRADATION QUANT: CPT

## 2023-12-21 RX ORDER — DAPSONE 100 MG/1
1 TABLET ORAL
Refills: 0 | DISCHARGE

## 2023-12-21 RX ORDER — METHYLENE BLUE 65 MG
5 TABLET ORAL
Qty: 0 | Refills: 0 | DISCHARGE

## 2023-12-21 RX ORDER — NALTREXONE HYDROCHLORIDE 50 MG/1
0 TABLET, FILM COATED ORAL
Refills: 0 | DISCHARGE

## 2023-12-21 RX ADMIN — Medication 40 MILLIGRAM(S): at 06:03

## 2023-12-21 RX ADMIN — APIXABAN 5 MILLIGRAM(S): 2.5 TABLET, FILM COATED ORAL at 06:02

## 2023-12-21 RX ADMIN — PANTOPRAZOLE SODIUM 40 MILLIGRAM(S): 20 TABLET, DELAYED RELEASE ORAL at 06:02

## 2023-12-21 RX ADMIN — ALBUTEROL 2.5 MILLIGRAM(S): 90 AEROSOL, METERED ORAL at 08:59

## 2023-12-21 RX ADMIN — ALBUTEROL 2.5 MILLIGRAM(S): 90 AEROSOL, METERED ORAL at 14:48

## 2023-12-21 NOTE — DISCHARGE NOTE PROVIDER - NSDCMRMEDTOKEN_GEN_ALL_CORE_FT
ALPRAZolam 1 mg oral tablet: 1 tab(s) orally 4 times a day as needed for  anxiety  atorvastatin 40 mg oral tablet: 1 tab(s) orally once a day  dapsone 100 mg oral tablet: 1 tab(s) orally once a day  Eliquis 5 mg oral tablet: 1 tab(s) orally 2 times a day  naltrexone compounding powder: compounding   ALPRAZolam 1 mg oral tablet: 1 tab(s) orally 4 times a day as needed for  anxiety  atorvastatin 40 mg oral tablet: 1 tab(s) orally once a day  Eliquis 5 mg oral tablet: 1 tab(s) orally 2 times a day  Medrol Dosepak 4 mg oral tablet: 1 tab(s) orally once a day as needed for  mouth swelling medrol dose pack--use per pack instructions  methylene blue 65 mg oral tablet: 5 milligram(s) orally once a day  predniSONE 20 mg oral tablet: 2 tab(s) orally once a day

## 2023-12-21 NOTE — DISCHARGE NOTE NURSING/CASE MANAGEMENT/SOCIAL WORK - PATIENT PORTAL LINK FT
You can access the FollowMyHealth Patient Portal offered by [facility] by registering at the following website: [email or web address]. By joining [employer]’s FollowMyHealth portal, you will also be able to view your health information using other applications (apps) compatible with our system. You can access the FollowMyHealth Patient Portal offered by [facility] by registering at the following website: [email or web address]. By joining [employer]’s FollowMyHealth portal, you will also be able to view your health information using other applications (apps) compatible with our system.

## 2023-12-21 NOTE — DISCHARGE NOTE PROVIDER - CARE PROVIDER_API CALL
Yazan Cantrell  Pulmonary Disease  [address], [address]  [place] [address]  Phone: [phone]  Fax: [phone]  Follow Up Time:     Rodolfo King  Hematology/Oncology  [address] [place] [address]  Phone: [phone]  Fax: [phone]  Follow Up Time:    Yazan Cantrell  Pulmonary Disease  [address], [address]  [place] [address]  Phone: [phone]  Fax: [phone]  Follow Up Time:     Rodolfo King  Hematology/Oncology  [address] [place] [address]  Phone: [phone]  Fax: [phone]  Follow Up Time:    Rodolfo King  Hematology/Oncology  [address] [place] [address]  Phone: [phone]  Fax: [phone]  Follow Up Time:     Ismael Alonso  Allergy and Immunology  [address] [place] [address]  Phone: [phone]  Fax: [phone]  Follow Up Time:     Carlos Ramirez  Otolaryngology  [address]  [place] [address]  Phone: [phone]  Fax: [phone]  Follow Up Time:    Rodolfo King  Hematology/Oncology  [address] [place] [address]  Phone: [phone]  Fax: [phone]  Follow Up Time:     Ismael Alonso  Allergy and Immunology  [address] [place] [address]  Phone: [phone]  Fax: [phone]  Follow Up Time:     Carlos Ramirez  Otolaryngology  [address] [facility] [address] [facility] [address] [facility] [address]  [place] [address]  Phone: [phone]  Fax: [phone]  Follow Up Time:

## 2023-12-21 NOTE — DISCHARGE NOTE PROVIDER - PROVIDER TOKENS
PROVIDER:[TOKEN:[[id number]]],PROVIDER:[TOKEN:[[id number]]] PROVIDER:[TOKEN:[[id number]]],PROVIDER:[TOKEN:[[id number]]] PROVIDER:[TOKEN:[[id number]]],PROVIDER:[TOKEN:[[id number]]],PROVIDER:[TOKEN:[[id number]]] PROVIDER:[TOKEN:[[id number]]],PROVIDER:[TOKEN:[[id number]]],PROVIDER:[TOKEN:[[id number]]]

## 2023-12-21 NOTE — DISCHARGE NOTE PROVIDER - ATTENDING ATTESTATION STATEMENT
Pi is here today to drop off a urine sample. Telephone encounter sent to Dr. Delores Gonzalez.
I have personally seen and examined the patient. I have collaborated with and supervised the

## 2023-12-21 NOTE — DISCHARGE NOTE PROVIDER - NSDCCPCAREPLAN_GEN_ALL_CORE_FT
PRINCIPAL DISCHARGE DIAGNOSIS  Diagnosis: Acute respiratory failure with hypoxia  Assessment and Plan of Treatment: Resolved. Please take medications as prescribed and follow up iwith a Pulmonologist      SECONDARY DISCHARGE DIAGNOSES  Diagnosis: Anemia of chronic disease  Assessment and Plan of Treatment: Please follow up with a hematologist to be evaluated for G6PD deficiency    Diagnosis: Angioedema  Assessment and Plan of Treatment: Resolved please take medications as prescribed and follow up with Pulmonologist     PRINCIPAL DISCHARGE DIAGNOSIS  Diagnosis: Acute respiratory failure with hypoxia  Assessment and Plan of Treatment: Resolved. Please take medications as prescribed  finish coures of prednisone  can use medrol dose pack if recurs      SECONDARY DISCHARGE DIAGNOSES  Diagnosis: Anemia of chronic disease  Assessment and Plan of Treatment: Please follow up with a hematologist to be evaluated for G6PD deficiency    Diagnosis: Angioedema  Assessment and Plan of Treatment: Resolved please take medications as prescribed   follow up with ENT/ allergy and your rheumatologist    Diagnosis: Methemoglobinemia  Assessment and Plan of Treatment: stop dapson and naltrexone  continue methylene blue    Diagnosis: Gastroenteritis  Assessment and Plan of Treatment: resolved

## 2023-12-21 NOTE — DISCHARGE NOTE NURSING/CASE MANAGEMENT/SOCIAL WORK - NSDCPEFALRISK_GEN_ALL_CORE
For information on Fall & Injury Prevention, visit: [email or web address] OR  [email or web address]-[email or web address] OR  https://www.cdc.gov/steadi/patient.html For information on Fall & Injury Prevention, visit: [email or web address]-[email or web address]-[email or web address] OR  [email or web address]://[email or web address].[facility]/[email or web address]-prevention-tips-to-avoid-injury OR  https://www.cdc.gov/steadi/patient.html

## 2023-12-21 NOTE — DISCHARGE NOTE PROVIDER - HOSPITAL COURSE
61 y/o female w/ PMHx of C. 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 Pt is very confuse now, Does not want to talk. Per ex  ,pt was admitted to [facility] last week for anaphylactic reaction and she was confused during the hospital say. She had prior anaphylactic reaction and following with allergy specialist ,no specific cause was found. Multiple medication changed recently .She was discharged with benadryl. Pt was c/o cough/itchy throat for last 2 days per EX- and she decided to come hospital last night.. Per ED note pt was aaox3 last night but pt is very confuse now. Admitted for respiratory failure with concern for allergic reaction.  IV Solumedrol and benadryl given, then transitioned to oral prednisone.  Chest CT showed no PE. ENT, pulmonology and hematology consulted. Encephalopathy resolved. Etiology unclear. CTH negative. Noted to have macrocytic anemia. Dapsone held due to possibility of hemolysis. Noted to have methemoglobinemia.  Toxicology consulted. Recommend single dose methyl ine blue IV.  No indication for further doses, should be based on clinical presentation, such SOB/ CP/ tachycardia.  Pt clinically improved and denies SOB, CP, dyspnea. Saturating in low 90s on RA. Toxicology recommends discontinuing dapsone/ naltrexone. Also so limited value to oral methyl ine blue as outpatient since the bioavailability is so low. Pt is clinically stable and ready for discharge.

## 2023-12-21 NOTE — DISCHARGE NOTE PROVIDER - NPI NUMBER (FOR SYSADMIN USE ONLY) :
[[id number]],[[id number]] [[id number]],[[id number]] [[id number]],[[id number]],[[id number]] [[id number]]

## 2023-12-21 NOTE — DISCHARGE NOTE PROVIDER - CARE PROVIDERS DIRECT ADDRESSES
,[email or web address],DirectAddress_Unknown ,[email or web address],DirectAddress_Unknown ,DirectAddress_Unknown,DirectAddress_Unknown,[email or web address] ,DirectAddress_Unknown,DirectAddress_Unknown,[email or web address]

## 2023-12-22 ENCOUNTER — NON-APPOINTMENT (OUTPATIENT)
Age: 63
End: 2023-12-22

## 2023-12-22 LAB
-  AMPICILLIN: SIGNIFICANT CHANGE UP
-  AMPICILLIN: SIGNIFICANT CHANGE UP
-  CIPROFLOXACIN: SIGNIFICANT CHANGE UP
-  CIPROFLOXACIN: SIGNIFICANT CHANGE UP
-  LEVOFLOXACIN: SIGNIFICANT CHANGE UP
-  LEVOFLOXACIN: SIGNIFICANT CHANGE UP
-  NITROFURANTOIN: SIGNIFICANT CHANGE UP
-  NITROFURANTOIN: SIGNIFICANT CHANGE UP
-  TETRACYCLINE: SIGNIFICANT CHANGE UP
-  TETRACYCLINE: SIGNIFICANT CHANGE UP
-  VANCOMYCIN: SIGNIFICANT CHANGE UP
-  VANCOMYCIN: SIGNIFICANT CHANGE UP
CULTURE RESULTS: ABNORMAL
CULTURE RESULTS: ABNORMAL
METHOD TYPE: SIGNIFICANT CHANGE UP
METHOD TYPE: SIGNIFICANT CHANGE UP
ORGANISM # SPEC MICROSCOPIC CNT: ABNORMAL
ORGANISM # SPEC MICROSCOPIC CNT: ABNORMAL
ORGANISM # SPEC MICROSCOPIC CNT: SIGNIFICANT CHANGE UP
ORGANISM # SPEC MICROSCOPIC CNT: SIGNIFICANT CHANGE UP
SPECIMEN SOURCE: SIGNIFICANT CHANGE UP
SPECIMEN SOURCE: SIGNIFICANT CHANGE UP

## 2023-12-23 LAB
G6PD RBC-CCNC: 21.1 U/G HGB — HIGH (ref 7–20.5)
G6PD RBC-CCNC: 21.1 U/G HGB — HIGH (ref 7–20.5)

## 2024-01-11 ENCOUNTER — APPOINTMENT (OUTPATIENT)
Dept: FAMILY MEDICINE | Facility: CLINIC | Age: 64
End: 2024-01-11
Payer: MEDICARE

## 2024-01-11 ENCOUNTER — LABORATORY RESULT (OUTPATIENT)
Age: 64
End: 2024-01-11

## 2024-01-11 VITALS
DIASTOLIC BLOOD PRESSURE: 80 MMHG | RESPIRATION RATE: 14 BRPM | WEIGHT: 125 LBS | OXYGEN SATURATION: 98 % | HEART RATE: 81 BPM | HEIGHT: 64 IN | BODY MASS INDEX: 21.34 KG/M2 | SYSTOLIC BLOOD PRESSURE: 100 MMHG

## 2024-01-11 DIAGNOSIS — I73.9 PERIPHERAL VASCULAR DISEASE, UNSPECIFIED: ICD-10-CM
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
## 2024-01-11 DIAGNOSIS — R30.0 DYSURIA: ICD-10-CM

## 2024-01-11 DIAGNOSIS — E78.00 PURE HYPERCHOLESTEROLEMIA, UNSPECIFIED: ICD-10-CM

## 2024-01-11 DIAGNOSIS — M25.50 PAIN IN UNSPECIFIED JOINT: ICD-10-CM

## 2024-01-11 DIAGNOSIS — R39.9 UNSPECIFIED SYMPTOMS AND SIGNS INVOLVING THE GENITOURINARY SYSTEM: ICD-10-CM

## 2024-01-11 DIAGNOSIS — M54.50 LOW BACK PAIN, UNSPECIFIED: ICD-10-CM

## 2024-01-11 DIAGNOSIS — M54.17 RADICULOPATHY, LUMBOSACRAL REGION: ICD-10-CM

## 2024-01-11 PROCEDURE — 99215 OFFICE O/P EST HI 40 MIN: CPT

## 2024-01-11 PROCEDURE — G2211 COMPLEX E/M VISIT ADD ON: CPT

## 2024-01-11 RX ORDER — ALPRAZOLAM 1 MG/1
1 TABLET ORAL
Qty: 120 | Refills: 0 | Status: ACTIVE | COMMUNITY
Start: 2022-08-29 | End: 1900-01-01

## 2024-01-11 RX ORDER — APIXABAN 5 MG/1
5 TABLET, FILM COATED ORAL
Qty: 60 | Refills: 2 | Status: ACTIVE | COMMUNITY
Start: 2022-03-03 | End: 1900-01-01

## 2024-01-11 RX ORDER — NITROFURANTOIN (MONOHYDRATE/MACROCRYSTALS) 25; 75 MG/1; MG/1
100 CAPSULE ORAL TWICE DAILY
Qty: 20 | Refills: 1 | Status: COMPLETED | COMMUNITY
Start: 2023-11-13 | End: 2024-01-11

## 2024-01-12 LAB
ALBUMIN SERPL ELPH-MCNC: 4.3 G/DL
ALP BLD-CCNC: 114 U/L
ALT SERPL-CCNC: 77 U/L
ANION GAP SERPL CALC-SCNC: 13 MMOL/L
APPEARANCE: ABNORMAL
AST SERPL-CCNC: 47 U/L
BILIRUB SERPL-MCNC: <0.2 MG/DL
BILIRUBIN URINE: ABNORMAL
BLOOD URINE: NEGATIVE
BUN SERPL-MCNC: 13 MG/DL
CALCIUM SERPL-MCNC: 9.6 MG/DL
CHLORIDE SERPL-SCNC: 104 MMOL/L
CHOLEST SERPL-MCNC: 227 MG/DL
CO2 SERPL-SCNC: 25 MMOL/L
COLOR: NORMAL
CREAT SERPL-MCNC: 1.06 MG/DL
EGFR: 59 ML/MIN/1.73M2
ERYTHROCYTE [SEDIMENTATION RATE] IN BLOOD BY WESTERGREN METHOD: 23 MM/HR
GLUCOSE QUALITATIVE U: NEGATIVE MG/DL
GLUCOSE SERPL-MCNC: 106 MG/DL
HDLC SERPL-MCNC: 72 MG/DL
KETONES URINE: NEGATIVE MG/DL
LDLC SERPL CALC-MCNC: 126 MG/DL
LEUKOCYTE ESTERASE URINE: NEGATIVE
NITRITE URINE: NEGATIVE
NONHDLC SERPL-MCNC: 155 MG/DL
PH URINE: 5.5
POTASSIUM SERPL-SCNC: 4.7 MMOL/L
PROT SERPL-MCNC: 7 G/DL
PROTEIN URINE: NORMAL MG/DL
SODIUM SERPL-SCNC: 141 MMOL/L
SPECIFIC GRAVITY URINE: 1.02
TRIGL SERPL-MCNC: 163 MG/DL
TSH SERPL-ACNC: 1.6 UIU/ML
UROBILINOGEN URINE: 1 MG/DL

## 2024-01-15 LAB
24R-OH-CALCIDIOL SERPL-MCNC: 51.2 PG/ML
BACTERIA UR CULT: NORMAL
BASOPHILS # BLD AUTO: 0.06 K/UL
BASOPHILS NFR BLD AUTO: 0.8 %
EOSINOPHIL # BLD AUTO: 0.35 K/UL
EOSINOPHIL NFR BLD AUTO: 4.7 %
HCT VFR BLD CALC: 41.3 %
HGB BLD-MCNC: 13.1 G/DL
IMM GRANULOCYTES NFR BLD AUTO: 0.1 %
LYMPHOCYTES # BLD AUTO: 2.86 K/UL
LYMPHOCYTES NFR BLD AUTO: 38.7 %
MAN DIFF?: NORMAL
MCHC RBC-ENTMCNC: 31.7 GM/DL
MCHC RBC-ENTMCNC: 34.5 PG
MCV RBC AUTO: 108.7 FL
MONOCYTES # BLD AUTO: 0.48 K/UL
MONOCYTES NFR BLD AUTO: 6.5 %
NEUTROPHILS # BLD AUTO: 3.63 K/UL
NEUTROPHILS NFR BLD AUTO: 49.2 %
PLATELET # BLD AUTO: 272 K/UL
RBC # BLD: 3.8 M/UL
RBC # FLD: 12.5 %
WBC # FLD AUTO: 7.39 K/UL

## 2024-01-15 NOTE — PHYSICAL EXAM
[No Acute Distress] : no acute distress [Well Nourished] : well nourished [Well Developed] : well developed [Well-Appearing] : well-appearing [Normal Sclera/Conjunctiva] : normal sclera/conjunctiva [PERRL] : pupils equal round and reactive to light [EOMI] : extraocular movements intact [Normal Outer Ear/Nose] : the outer ears and nose were normal in appearance [Normal Oropharynx] : the oropharynx was normal [No JVD] : no jugular venous distention [No Lymphadenopathy] : no lymphadenopathy [Supple] : supple [Thyroid Normal, No Nodules] : the thyroid was normal and there were no nodules present [No Respiratory Distress] : no respiratory distress  [No Accessory Muscle Use] : no accessory muscle use [Clear to Auscultation] : lungs were clear to auscultation bilaterally [Normal Rate] : normal rate  [Regular Rhythm] : with a regular rhythm [Normal S1, S2] : normal S1 and S2 [No Murmur] : no murmur heard [No Carotid Bruits] : no carotid bruits [No Abdominal Bruit] : a ~M bruit was not heard ~T in the abdomen [No Varicosities] : no varicosities [Pedal Pulses Present] : the pedal pulses are present [No Edema] : there was no peripheral edema [No Palpable Aorta] : no palpable aorta [No Extremity Clubbing/Cyanosis] : no extremity clubbing/cyanosis [Soft] : abdomen soft [Non Tender] : non-tender [Non-distended] : non-distended [No Masses] : no abdominal mass palpated [No HSM] : no HSM [Normal Bowel Sounds] : normal bowel sounds [No CVA Tenderness] : no CVA  tenderness [No Spinal Tenderness] : no spinal tenderness [No Joint Swelling] : no joint swelling [Grossly Normal Strength/Tone] : grossly normal strength/tone [No Rash] : no rash [Coordination Grossly Intact] : coordination grossly intact [No Focal Deficits] : no focal deficits [Normal Gait] : normal gait [Deep Tendon Reflexes (DTR)] : deep tendon reflexes were 2+ and symmetric [Normal Affect] : the affect was normal [Normal Insight/Judgement] : insight and judgment were intact [de-identified] : Healed lesions

## 2024-01-15 NOTE — HEALTH RISK ASSESSMENT
[Yes] : Yes [Monthly or less (1 pt)] : Monthly or less (1 point) [1 or 2 (0 pts)] : 1 or 2 (0 points) [Never (0 pts)] : Never (0 points) [No] : In the past 12 months have you used drugs other than those required for medical reasons? No [No falls in past year] : Patient reported no falls in the past year [0] : 2) Feeling down, depressed, or hopeless: Not at all (0) [PHQ-2 Negative - No further assessment needed] : PHQ-2 Negative - No further assessment needed [Patient/Caregiver not ready to engage] : , patient/caregiver not ready to engage [I will adhere to the patient's wishes.] : I will adhere to the patient's wishes. [Time Spent: ___ minutes] : Time Spent: [unfilled] minutes [Audit-CScore] : 1 [de-identified] : Average [de-identified] : Average [[facility]] : 9 [[id number]] : 0 [AdvancecareDate] : 10/21

## 2024-01-15 NOTE — ASSESSMENT
[FreeTextEntry1] : Follow up for a 63 year old female with PMH of anxiety, PVD, UTI, polyarthralgia, who presents for a follow up. Patient c/o lower UTI symptoms x 1 week. Patient c/o skin irritation with pruritis and scabbing over the past three 3 years. Patient states she was recently treated for an allergic reaction in December- unknown cause. Patient reports visiting numerous doctors for skin changes and other illnesses but has unknown diagnosis.  Bloodwork ordered Medications reviewed and renewed Patient continuing to seek treatment for skin and other symptoms Urinalysis/culture obtained  RTC in 3 months

## 2024-01-15 NOTE — REVIEW OF SYSTEMS
[Patient Intake Form Reviewed] : Patient intake form was reviewed [Negative] : Heme/Lymph [Itching] : Itching [Skin Rash] : skin rash [Joint Pain] : no joint pain [Muscle Pain] : no muscle pain [Joint Stiffness] : no joint stiffness [Back Pain] : no back pain

## 2024-01-15 NOTE — HISTORY OF PRESENT ILLNESS
[No Pertinent Cardiac History] : no history of aortic stenosis, atrial fibrillation, coronary artery disease, recent myocardial infarction, or implantable device/pacemaker [No Pertinent Pulmonary History] : no history of asthma, COPD, sleep apnea, or smoking [Aortic Stenosis] : no aortic stenosis [Atrial Fibrillation] : no atrial fibrillation [Coronary Artery Disease] : no coronary artery disease [Recent Myocardial Infarction] : no recent myocardial infarction [Implantable Device/Pacemaker] : no implantable device/pacemaker [Asthma] : no asthma [COPD] : no COPD [Sleep Apnea] : no sleep apnea [Smoker] : not a smoker [Chronic Anticoagulation] : no chronic anticoagulation [Chronic Kidney Disease] : no chronic kidney disease [Diabetes] : no diabetes [FreeTextEntry2] : 08/10/23 [FreeTextEntry3] : Dr AARON [FreeTextEntry4] :  Patient had PVD and multiple clots on Eliquis, is followed by ID, Gastro, Neuro, Vascular, 911 Program, Pulmonary, and colorectal surgery. She is also followed by [facility] by multiple providers. Has not improved needs letter for [facility]  [FreeTextEntry1] : Follow up for a 63 year old female with PMH of anxiety, PVD, UTI, polyarthralgia, who presents for a follow up. Patient c/o lower UTI symptoms x 1 week. [de-identified] : Follow up for a 63 year old female with PMH of anxiety, PVD, UTI, polyarthralgia, who presents for a follow up. Patient c/o lower UTI symptoms x 1 week. Patient c/o skin irritation with pruritis and scabbing over the past three 3 years. Patient states she was recently treated for an allergic reaction in December- unknown cause. Patient reports visiting numerous doctors for skin changes and other illnesses but has unknown diagnosis.

## 2024-01-16 ENCOUNTER — APPOINTMENT (OUTPATIENT)
Dept: DERMATOLOGY | Facility: CLINIC | Age: 64
End: 2024-01-16
Payer: MEDICARE

## 2024-01-16 PROCEDURE — 11104 PUNCH BX SKIN SINGLE LESION: CPT

## 2024-01-16 PROCEDURE — 99203 OFFICE O/P NEW LOW 30 MIN: CPT | Mod: 25

## 2024-01-23 ENCOUNTER — NON-APPOINTMENT (OUTPATIENT)
Age: 64
End: 2024-01-23

## 2024-01-25 ENCOUNTER — APPOINTMENT (OUTPATIENT)
Dept: GASTROENTEROLOGY | Facility: CLINIC | Age: 64
End: 2024-01-25

## 2024-01-29 ENCOUNTER — APPOINTMENT (OUTPATIENT)
Dept: GASTROENTEROLOGY | Facility: CLINIC | Age: 64
End: 2024-01-29

## 2024-01-30 ENCOUNTER — APPOINTMENT (OUTPATIENT)
Dept: DERMATOLOGY | Facility: CLINIC | Age: 64
End: 2024-01-30
Payer: MEDICARE

## 2024-01-30 PROCEDURE — 99214 OFFICE O/P EST MOD 30 MIN: CPT

## 2024-01-31 ENCOUNTER — APPOINTMENT (OUTPATIENT)
Dept: OTOLARYNGOLOGY | Facility: CLINIC | Age: 64
End: 2024-01-31
Payer: MEDICARE

## 2024-01-31 VITALS
WEIGHT: 125 LBS | DIASTOLIC BLOOD PRESSURE: 75 MMHG | BODY MASS INDEX: 21.34 KG/M2 | HEART RATE: 76 BPM | HEIGHT: 64 IN | SYSTOLIC BLOOD PRESSURE: 124 MMHG

## 2024-01-31 DIAGNOSIS — R13.14 DYSPHAGIA, PHARYNGOESOPHAGEAL PHASE: ICD-10-CM

## 2024-01-31 DIAGNOSIS — R09.89 OTHER SPECIFIED SYMPTOMS AND SIGNS INVOLVING THE CIRCULATORY AND RESPIRATORY SYSTEMS: ICD-10-CM

## 2024-01-31 DIAGNOSIS — R22.1 LOCALIZED SWELLING, MASS AND LUMP, NECK: ICD-10-CM

## 2024-01-31 PROCEDURE — 99204 OFFICE O/P NEW MOD 45 MIN: CPT | Mod: 25

## 2024-01-31 PROCEDURE — 31575 DIAGNOSTIC LARYNGOSCOPY: CPT

## 2024-01-31 RX ORDER — LEVOCETIRIZINE DIHYDROCHLORIDE 5 MG/1
5 TABLET ORAL
Refills: 0 | Status: ACTIVE | COMMUNITY

## 2024-01-31 RX ORDER — DAPSONE 25 MG/1
TABLET ORAL
Refills: 0 | Status: COMPLETED | COMMUNITY
End: 2024-01-31

## 2024-01-31 NOTE — HISTORY OF PRESENT ILLNESS
[de-identified] : 64 y/o F with a h/o intermittent swelling in her L SMG region and dysphagia over the past 2-1/2 years.  She reports copious amounts of phlegm in her throat; throat and neck swelling; swollen glands; possible Lyme Disease induced rash. Pt has appt with ID sched for 05/08/2024.

## 2024-01-31 NOTE — DATA REVIEWED
[de-identified] : IMAGES REVIEWED: ACC: [id number] EXAM: CT NECK SOFT TISSUE  PROCEDURE DATE: 09/01/2022    INTERPRETATION: CT neck without contrast  CLINICAL INFORMATION: Difficulty swallowing  TECHNIQUE: Contiguous axial 3 mm thick sections were obtained through the neck using single helical acquisition. 3 mm sagittal and coronal reconstructions were obtained. This scan was performed using automatic exposure control (radiation dose reduction software) to obtain a diagnostic image quality scan with patient dose as low as reasonably achievable.  FINDINGS: No prior similar studies are available for review.  Calcified granulomas noted within the malar fat, RIGHT greater than LEFT with overlying dermal thickening, possible cellulitis, on the RIGHT, possibly related to calcified facial fillers. Clinical correlation needed.  No neck mass is found. No pathologically enlarged lymph nodes are found. The visualized lymph nodes demonstrate no central necrosis or extranodal extension, allowing for the noncontrast technique.  The mucosal surfaces of the upper aerodigestive tract appear symmetric and unremarkable. The larynx is intact. The preepiglottic and paralaryngeal spaces are intact. Laryngeal cartilages remain intact.  The nasopharynx is symmetric. No lateral retropharyngeal mass is found. The underlying central skull base is intact. The petrous temporal bones including mastoid air cells are intact. The visualized base of brain appears unremarkable.  The parotid and submandibular glands are intact. The thyroid gland is intact.  The visualized paranasal sinuses are significant for mild mucosal thickening in the BILATERAL ethmoid sinuses. The nasal cavity is unremarkable.  The cervical spine shows ACDF at C6-7. Disc degeneration noted at C4-5 and C5-C6 and C7-T1 with loss of disc height and associated degenerative endplate changes. Narrowing of the LEFT C2-3 and RIGHT C3-4 and LEFT C4-5, LEFT C5-C6 and BILATERAL C6-7 neural foramina due to uncovertebral spurring and facet osteophytic hypertrophy.  The lung apices are clear, allowing for the for the neck CT technique.   IMPRESSION: Calcified granulomas noted within the malar fat, RIGHT greater than LEFT with overlying dermal thickening, possible cellulitis, on the RIGHT, possibly related to calcified facial fillers. Clinical correlation needed.  ACDF at C6-7. Disc degeneration noted at C4-5 and C5-C6 and C7-T1 with loss of disc height and associated degenerative endplate changes. Narrowing of the LEFT C2-3 and RIGHT C3-4 and LEFT C4-5, LEFT C5-C6 and BILATERAL C6-7 neural foramina due to uncovertebral spurring and facet osteophytic hypertrophy.   --- End of Report ---      NANCY ALVARADO MD; Attending Radiologist This document has been electronically signed. Sep 1 2022 3:39PM

## 2024-01-31 NOTE — PHYSICAL EXAM
[de-identified] : Thyroid scar,  [Midline] : trachea located in midline position [Laryngoscopy Performed] : laryngoscopy was performed, see procedure section for findings [Normal] : no rashes

## 2024-01-31 NOTE — PROCEDURE
[Unable to Cooperate with Mirror] : patient unable to cooperate with mirror [Dysphagia] : dysphagia not clearly evaluated by indirect laryngoscopy [None] : none [Flexible Endoscope] : examined with the flexible endoscope [Serial Number: ___] : Serial Number: [unfilled] [True Vocal Cords Paralysis] : no true vocal cord paralysis [True Vocal Cords Erythematous] : no true vocal cord edema [True Vocal Cords Peterson's Nodules] : no true vocal cord nodules [Glottis Arytenoid Cartilages] : no arytenoid granulomas [Glottis Arytenoid Cartilages Erythema] : no arytenoid erythema [Normal] : posterior cricoid area had healthy pink mucosa in the interarytenoid area and the esophageal inlet [de-identified] :  Surgilube

## 2024-01-31 NOTE — CONSULT LETTER
[Dear  ___] : Dear  [unfilled], [Consult Letter:] : I had the pleasure of evaluating your patient, [unfilled]. [Please see my note below.] : Please see my note below. [Consult Closing:] : Thank you very much for allowing me to participate in the care of this patient.  If you have any questions, please do not hesitate to contact me. [Sincerely,] : Sincerely, [FreeTextEntry2] : Gus Alberto MD [FreeTextEntry3] : Carlos Ramirez MD, FACS Chief of Otolaryngology and Head & Neck Surgery - [facility]  - Dept. of Otolaryngology - [facility] [phone]

## 2024-01-31 NOTE — REASON FOR VISIT
[Initial Consultation] : an initial consultation for [FreeTextEntry2] : neck swelling, dysphagia and choking episodes

## 2024-01-31 NOTE — REVIEW OF SYSTEMS
[Swelling Neck] : swelling neck [As Noted in HPI] : as noted in HPI [Skin Lesions] : skin lesions [Negative] : Neurological [de-identified] : Pt reports negative testing in the past [de-identified] : Intermittent tongue swelling [FreeTextEntry4] : L SMG region [FreeTextEntry3] : Mucous in eyes [FreeTextEntry6] : Choking episode

## 2024-02-01 ENCOUNTER — APPOINTMENT (OUTPATIENT)
Dept: INTERNAL MEDICINE | Facility: CLINIC | Age: 64
End: 2024-02-01
Payer: MEDICARE

## 2024-02-01 VITALS
SYSTOLIC BLOOD PRESSURE: 126 MMHG | DIASTOLIC BLOOD PRESSURE: 82 MMHG | HEIGHT: 64 IN | WEIGHT: 125 LBS | BODY MASS INDEX: 21.34 KG/M2

## 2024-02-01 DIAGNOSIS — M79.652 PAIN IN LEFT THIGH: ICD-10-CM

## 2024-02-01 DIAGNOSIS — F41.9 ANXIETY DISORDER, UNSPECIFIED: ICD-10-CM

## 2024-02-01 DIAGNOSIS — L08.9 LOCAL INFECTION OF THE SKIN AND SUBCUTANEOUS TISSUE, UNSPECIFIED: ICD-10-CM

## 2024-02-01 DIAGNOSIS — R76.8 OTHER SPECIFIED ABNORMAL IMMUNOLOGICAL FINDINGS IN SERUM: ICD-10-CM

## 2024-02-01 PROCEDURE — 99214 OFFICE O/P EST MOD 30 MIN: CPT

## 2024-02-01 NOTE — ASSESSMENT
[FreeTextEntry1] : 64 YO FEMALE WITH PMH OF  MULITPLE SOMATIC COMPLAINTS OVER PAST 3 .5 YEARS SHE HAS BEEN TO [facility]  INCLUDING SEVERAL INFECTIOUS DISEASE SPECAILIST SHE WAS REFERRED HERE WAS FOR POS LYME SEROLOGY SHE DESCRIBES SX OF RASH SHE FEELS LIKE HER BODY IS ATTACKING IT SELF  BRINGS IN PHOTOS OVER THE YEARS NO CLEAR EXPOSURE TO TICKS LABS NEG    PT WITH LYME IGM WIHT TWO BAND IGG IS  NEG I AM NOT CONVINCED AS NO OBVIOUS TICK EXPOSURES  WILL RECOMMEND REPEAT LASB IN A MONTH IF NO CHANGE E THEN WOULD DEFER TREATMENT IF POS IGG MAY CONISDER TX THE SX PT ARE DESCRIBING ARE NTO C/W LYME OR ANY COMMON ILLNESS MAY BE AUTOIMMUNE ADN SHOULD CONISDER  ANSWERED ALL QUESTIONS SUGGEST RHEUM EVAL R/O LESS COMMON DISEAE REASSURANCE JENNIFERNBAILEY SUGGEST [facility]   TO HELP ESTABLISH DX

## 2024-02-01 NOTE — REASON FOR VISIT
[Consultation] : a consultation visit [FreeTextEntry1] : POSITIVE LYMME SEROLOGY AND MULTUPLE SOMAITC COMPLAINTS

## 2024-02-01 NOTE — HISTORY OF PRESENT ILLNESS
[FreeTextEntry1] : 62 YO FEMALE WITH PMH OF  MULITPLE SOMATIC COMPLAINTS OVER PAST 3 .5 YEARS SHE HAS BEEN TO [facility]  INCLUDING SEVERAL INFECTIOUS DISEASE SPECAILIST SHE WAS REFERRED HERE WAS FOR POS LYME SEROLOGY SHE DESCRIBES SX OF RASH SHE FEELS LIKE HER BODY IS ATTACKING IT SELF  BRINGS IN PHOTOS OVER THE YEARS NO CLEAR EXPOSURE TO TICKS LABS NEG

## 2024-02-01 NOTE — PHYSICAL EXAM
[General Appearance - Alert] : alert [General Appearance - In No Acute Distress] : in no acute distress [Sclera] : the sclera and conjunctiva were normal [PERRL With Normal Accommodation] : pupils were equal in size, round, reactive to light [Extraocular Movements] : extraocular movements were intact [Outer Ear] : the ears and nose were normal in appearance [Oropharynx] : the oropharynx was normal with no thrush [Auscultation Breath Sounds / Voice Sounds] : lungs were clear to auscultation bilaterally [Heart Rate And Rhythm] : heart rate was normal and rhythm regular [Heart Sounds] : normal S1 and S2 [Heart Sounds Gallop] : no gallops [Murmurs] : no murmurs [Heart Sounds Pericardial Friction Rub] : no pericardial rub [Full Pulse] : the pedal pulses are present [Edema] : there was no peripheral edema [Bowel Sounds] : normal bowel sounds [Abdomen Soft] : soft [Abdomen Tenderness] : non-tender [] : no hepato-splenomegaly [Abdomen Mass (___ Cm)] : no abdominal mass palpated [Costovertebral Angle Tenderness] : no CVA tenderness [Skin Color & Pigmentation] : normal skin color and pigmentation [Cranial Nerves] : cranial nerves 2-12 were intact [Oriented To Time, Place, And Person] : oriented to person, place, and time

## 2024-02-01 NOTE — REVIEW OF SYSTEMS
[As Noted in HPI] : as noted in HPI [Body Aches] : body aches [Difficulty Sleeping] : difficulty sleeping [Feeling Tired] : feeling tired [Feeling Sick] : feeling sick [Negative] : Heme/Lymph

## 2024-02-07 DIAGNOSIS — M54.2 CERVICALGIA: ICD-10-CM

## 2024-02-10 ENCOUNTER — OUTPATIENT (OUTPATIENT)
Dept: OUTPATIENT SERVICES | Facility: HOSPITAL | Age: 64
LOS: 1 days | End: 2024-02-10
Payer: MEDICARE

## 2024-02-10 ENCOUNTER — APPOINTMENT (OUTPATIENT)
Dept: MRI IMAGING | Facility: CLINIC | Age: 64
End: 2024-02-10

## 2024-02-10 DIAGNOSIS — M54.2 CERVICALGIA: ICD-10-CM

## 2024-02-10 DIAGNOSIS — Z98.890 OTHER SPECIFIED POSTPROCEDURAL STATES: Chronic | ICD-10-CM

## 2024-02-10 DIAGNOSIS — Z87.81 PERSONAL HISTORY OF (HEALED) TRAUMATIC FRACTURE: Chronic | ICD-10-CM

## 2024-02-10 PROCEDURE — 70543 MRI ORBT/FAC/NCK W/O &W/DYE: CPT | Mod: 26

## 2024-02-21 ENCOUNTER — APPOINTMENT (OUTPATIENT)
Dept: FAMILY MEDICINE | Facility: CLINIC | Age: 64
End: 2024-02-21

## 2024-02-22 NOTE — ED ADULT NURSE NOTE - ISOLATION TYPE:
Render Risk Assessment In Note?: no Detail Level: Simple Comment: Discussed oral Minoxidil, pending labs. None

## 2024-02-28 ENCOUNTER — APPOINTMENT (OUTPATIENT)
Dept: FAMILY MEDICINE | Facility: CLINIC | Age: 64
End: 2024-02-28
Payer: MEDICARE

## 2024-02-28 VITALS
HEIGHT: 64 IN | TEMPERATURE: 98.2 F | HEART RATE: 84 BPM | SYSTOLIC BLOOD PRESSURE: 124 MMHG | BODY MASS INDEX: 22.2 KG/M2 | OXYGEN SATURATION: 97 % | RESPIRATION RATE: 14 BRPM | WEIGHT: 130.04 LBS | DIASTOLIC BLOOD PRESSURE: 68 MMHG

## 2024-02-28 DIAGNOSIS — Z23 ENCOUNTER FOR IMMUNIZATION: ICD-10-CM

## 2024-02-28 PROCEDURE — 90715 TDAP VACCINE 7 YRS/> IM: CPT | Mod: GY

## 2024-02-28 PROCEDURE — 90471 IMMUNIZATION ADMIN: CPT

## 2024-02-28 PROCEDURE — 99213 OFFICE O/P EST LOW 20 MIN: CPT | Mod: 25

## 2024-02-28 NOTE — COUNSELING
[Fall prevention counseling provided] : Fall prevention counseling provided [Adequate lighting] : Adequate lighting [No throw rugs] : No throw rugs [Use proper foot wear] : Use proper foot wear [Behavioral health counseling provided] : Behavioral health counseling provided [Sleep ___ hours/day] : Sleep [unfilled] hours/day [Plan in advance] : Plan in advance [Engage in a relaxing activity] : Engage in a relaxing activity [AUDIT-C Screening administered and reviewed] : AUDIT-C Screening administered and reviewed [Potential consequences of obesity discussed] : Potential consequences of obesity discussed [Benefits of weight loss discussed] : Benefits of weight loss discussed [Encouraged to increase physical activity] : Encouraged to increase physical activity [Weigh Self Weekly] : weigh self weekly [Decrease Portions] : decrease portions [None] : None [Good understanding] : Patient has a good understanding of lifestyle changes and steps needed to achieve self management goal

## 2024-02-29 ENCOUNTER — APPOINTMENT (OUTPATIENT)
Dept: GASTROENTEROLOGY | Facility: CLINIC | Age: 64
End: 2024-02-29
Payer: MEDICARE

## 2024-02-29 VITALS
BODY MASS INDEX: 23.05 KG/M2 | HEIGHT: 64 IN | DIASTOLIC BLOOD PRESSURE: 70 MMHG | WEIGHT: 135 LBS | SYSTOLIC BLOOD PRESSURE: 98 MMHG

## 2024-02-29 DIAGNOSIS — Z00.00 ENCOUNTER FOR GENERAL ADULT MEDICAL EXAMINATION W/OUT ABNORMAL FINDINGS: ICD-10-CM

## 2024-02-29 DIAGNOSIS — K21.9 GASTRO-ESOPHAGEAL REFLUX DISEASE W/OUT ESOPHAGITIS: ICD-10-CM

## 2024-02-29 PROCEDURE — 99203 OFFICE O/P NEW LOW 30 MIN: CPT

## 2024-02-29 NOTE — ASSESSMENT
[FreeTextEntry1] : 63 year old female presents to the clinic for post-hospital visit follow up. Imaging at the time showed wall thickening of esophagus and duodenum. Could have been viral etiology? Continue PPI QD for reflux symptom management. Educated on importance of diet and lifestyle modifications. Offered EGD to evaluate recent imaging findings, however, patient declines procedure at this time and will contact our office back. In the meantime, patient will fax us prior records from her GI. All questions answered, pt expressed understanding.

## 2024-02-29 NOTE — HISTORY OF PRESENT ILLNESS
[FreeTextEntry1] : Shantelle Arce is a 63 year old female w/ PMH TIA and carotid artery stenosis (on Eliquis) presents to clinic for post-hospitalization follow up. Last month, pt went to [facility] ED for c/o epigastric pain and SOB. CT A/P w/ contrast showed "diffuse wall thickening of the thoracic esophagus and mild fluid-filled distention of thickened wall proximal duodenum." She was last seen by her GI in 8/2023, had EGD/colo, and as per patient "had polyps in colon which were not removed." Currently taking PPI for acid reflux which manages her reflux symptoms well. Denies GI complaints, has regular bowel movements that are soft without straining or overt bleeding such as melena or hematochezia. Denies GI symptoms such as abdominal pain, nausea, vomiting, odynophagia, and unintentional weight loss. [de-identified] : 12/19/2023: Small hiatal hernia with diffuse wall thickening of the thoracic esophagus. This may be reflective of gastroesophageal reflux disease or represent esophagitis. Consider nonemergent upper endoscopy. Mild fluid-filled distention of thickened wall proximal duodenum with adjacent stranding, concerning for duodenitis. No bowel obstruction.

## 2024-02-29 NOTE — REVIEW OF SYSTEMS
[Vomiting] : no vomiting [Abdominal Pain] : no abdominal pain [Heartburn] : no heartburn [Constipation] : no constipation [Diarrhea] : no diarrhea [Melena (black stool)] : no melena [Bleeding] : no bleeding [Fecal Incontinence (soiling)] : no fecal incontinence [Bloating (gassiness)] : no bloating

## 2024-03-04 NOTE — HEALTH RISK ASSESSMENT
[Yes] : Yes [Monthly or less (1 pt)] : Monthly or less (1 point) [1 or 2 (0 pts)] : 1 or 2 (0 points) [Never (0 pts)] : Never (0 points) [No] : In the past 12 months have you used drugs other than those required for medical reasons? No [No falls in past year] : Patient reported no falls in the past year [0] : 1) Little interest or pleasure doing things: Not at all (0) [PHQ-2 Negative - No further assessment needed] : PHQ-2 Negative - No further assessment needed [Patient/Caregiver not ready to engage] : , patient/caregiver not ready to engage [Time Spent: ___ minutes] : Time Spent: [unfilled] minutes [I will adhere to the patient's wishes.] : I will adhere to the patient's wishes. [Audit-CScore] : 1 [de-identified] : Average [de-identified] : Average [ALI1Bpuzy] : 0 [facility] : [facility] [AdvancecareDate] : 10/21

## 2024-03-04 NOTE — ASSESSMENT
[FreeTextEntry1] : Follow up for a 63 year old female with PMH of anxiety, PVD, UTI, polyarthralgia, who presents requesting Tdap vaccine and ID referral.   Tdap vaccine given in right deltoid by RN  Pt requesting ID second opinion, referral given.   RTC in 3 months

## 2024-03-04 NOTE — REVIEW OF SYSTEMS
Focus note:    Consult was placed to Ostomy RNCandy at [facility]. Discussed with Candy Patient Intake Form Reviewed] : Patient intake form was reviewed [Itching] : Itching [Skin Rash] : skin rash [Negative] : Heme/Lymph [Nosebleed] : no nosebleeds [Joint Pain] : no joint pain [Joint Stiffness] : no joint stiffness [Muscle Pain] : no muscle pain [Back Pain] : no back pain [FreeTextEntry4] : blood in mucus after blowing nose

## 2024-03-04 NOTE — PHYSICAL EXAM
[No Acute Distress] : no acute distress [Well Nourished] : well nourished [Well Developed] : well developed [Well-Appearing] : well-appearing [Normal Sclera/Conjunctiva] : normal sclera/conjunctiva [PERRL] : pupils equal round and reactive to light [EOMI] : extraocular movements intact [Normal Outer Ear/Nose] : the outer ears and nose were normal in appearance [Normal Oropharynx] : the oropharynx was normal [No JVD] : no jugular venous distention [No Lymphadenopathy] : no lymphadenopathy [Supple] : supple [Thyroid Normal, No Nodules] : the thyroid was normal and there were no nodules present [No Respiratory Distress] : no respiratory distress  [No Accessory Muscle Use] : no accessory muscle use [Clear to Auscultation] : lungs were clear to auscultation bilaterally [Normal Rate] : normal rate  [Regular Rhythm] : with a regular rhythm [Normal S1, S2] : normal S1 and S2 [No Murmur] : no murmur heard [No Carotid Bruits] : no carotid bruits [No Varicosities] : no varicosities [No Abdominal Bruit] : a ~M bruit was not heard ~T in the abdomen [Pedal Pulses Present] : the pedal pulses are present [No Edema] : there was no peripheral edema [No Palpable Aorta] : no palpable aorta [No Extremity Clubbing/Cyanosis] : no extremity clubbing/cyanosis [Soft] : abdomen soft [Non Tender] : non-tender [Non-distended] : non-distended [No Masses] : no abdominal mass palpated [Normal Bowel Sounds] : normal bowel sounds [No HSM] : no HSM [No CVA Tenderness] : no CVA  tenderness [No Spinal Tenderness] : no spinal tenderness [No Joint Swelling] : no joint swelling [Grossly Normal Strength/Tone] : grossly normal strength/tone [No Rash] : no rash [Coordination Grossly Intact] : coordination grossly intact [No Focal Deficits] : no focal deficits [Normal Gait] : normal gait [Deep Tendon Reflexes (DTR)] : deep tendon reflexes were 2+ and symmetric [Normal Affect] : the affect was normal [Normal Insight/Judgement] : insight and judgment were intact [de-identified] : Healed lesions

## 2024-03-04 NOTE — HISTORY OF PRESENT ILLNESS
[FreeTextEntry1] : Follow up for a 63 year old female with PMH of anxiety, PVD, UTI, polyarthralgia, who presents requesting Tdap vaccine and ID referral.  [de-identified] : Follow up for a 63 year old female with PMH of anxiety, PVD, UTI, polyarthralgia, who presents requesting updated Tdap vaccine and ID referral for multiple somatic complaints.

## 2024-03-11 RX ORDER — ROSUVASTATIN CALCIUM 40 MG/1
40 TABLET, FILM COATED ORAL DAILY
Qty: 90 | Refills: 1 | Status: ACTIVE | COMMUNITY
Start: 2021-08-13 | End: 1900-01-01

## 2024-05-01 ENCOUNTER — OUTPATIENT (OUTPATIENT)
Dept: OUTPATIENT SERVICES | Facility: HOSPITAL | Age: 64
LOS: 1 days | End: 2024-05-01

## 2024-05-01 DIAGNOSIS — Z98.890 OTHER SPECIFIED POSTPROCEDURAL STATES: Chronic | ICD-10-CM

## 2024-05-01 DIAGNOSIS — T65.891A TOXIC EFFECT OF OTHER SPECIFIED SUBSTANCES, ACCIDENTAL (UNINTENTIONAL), INITIAL ENCOUNTER: ICD-10-CM

## 2024-05-01 DIAGNOSIS — Z87.81 PERSONAL HISTORY OF (HEALED) TRAUMATIC FRACTURE: Chronic | ICD-10-CM

## 2024-05-01 DIAGNOSIS — D45 POLYCYTHEMIA VERA: ICD-10-CM

## 2024-05-01 PROCEDURE — 99195 PHLEBOTOMY: CPT

## 2024-07-09 NOTE — PATIENT PROFILE ADULT - ARRIVAL FROM
chaperone present in room , pleasant, well nourished, in no acute distress , comfortable , calm and relaxed , cooperative Home

## 2024-07-30 ENCOUNTER — RX RENEWAL (OUTPATIENT)
Age: 64
End: 2024-07-30

## 2025-02-05 ENCOUNTER — APPOINTMENT (OUTPATIENT)
Dept: FAMILY MEDICINE | Facility: CLINIC | Age: 65
End: 2025-02-05

## 2025-02-23 NOTE — HEALTH RISK ASSESSMENT
[Yes] : Yes [Monthly or less (1 pt)] : Monthly or less (1 point) [1 or 2 (0 pts)] : 1 or 2 (0 points) [Never (0 pts)] : Never (0 points) [No] : In the past 12 months have you used drugs other than those required for medical reasons? No [No falls in past year] : Patient reported no falls in the past year [0] : 2) Feeling down, depressed, or hopeless: Not at all (0) [PHQ-2 Negative - No further assessment needed] : PHQ-2 Negative - No further assessment needed [Patient/Caregiver not ready to engage] : , patient/caregiver not ready to engage [I will adhere to the patient's wishes.] : I will adhere to the patient's wishes. [Time Spent: ___ minutes] : Time Spent: [unfilled] minutes [Audit-CScore] : 1 [de-identified] : Average [de-identified] : Average [facility] : 9 [[id number]] : 0 [AdvancecareDate] : 10/21 normal/well-groomed/no distress

## 2025-07-21 NOTE — ED PROVIDER NOTE - CCCP TRG CHIEF CMPLNT
Is the patient due for a refill? Yes    Was the patient seen the last 15 months? Yes    Date of last office visit: 6/13/2024    Does the patient have an upcoming appointment?  No    Provider to refill:LH    Does the patient have [facility] and need 100-day supply? (This applies to ALL medications) Patient does not have SCP       allergic reaction